# Patient Record
Sex: FEMALE | Race: WHITE | NOT HISPANIC OR LATINO | Employment: OTHER | URBAN - NONMETROPOLITAN AREA
[De-identification: names, ages, dates, MRNs, and addresses within clinical notes are randomized per-mention and may not be internally consistent; named-entity substitution may affect disease eponyms.]

---

## 2017-01-08 DIAGNOSIS — E03.8 OTHER SPECIFIED HYPOTHYROIDISM: Primary | ICD-10-CM

## 2017-01-10 RX ORDER — LEVOTHYROXINE SODIUM 25 UG/1
TABLET ORAL
Qty: 90 TABLET | Refills: 3 | Status: SHIPPED | OUTPATIENT
Start: 2017-01-10 | End: 2017-12-07

## 2017-01-11 ENCOUNTER — OFFICE VISIT (OUTPATIENT)
Dept: FAMILY MEDICINE | Facility: OTHER | Age: 77
End: 2017-01-11
Attending: FAMILY MEDICINE
Payer: MEDICARE

## 2017-01-11 VITALS
DIASTOLIC BLOOD PRESSURE: 78 MMHG | TEMPERATURE: 97.8 F | RESPIRATION RATE: 17 BRPM | BODY MASS INDEX: 33.98 KG/M2 | WEIGHT: 174 LBS | HEART RATE: 68 BPM | SYSTOLIC BLOOD PRESSURE: 139 MMHG

## 2017-01-11 DIAGNOSIS — M79.645 PAIN OF FINGER OF LEFT HAND: Primary | ICD-10-CM

## 2017-01-11 DIAGNOSIS — M46.1 SACROILIITIS (H): ICD-10-CM

## 2017-01-11 PROCEDURE — 99214 OFFICE O/P EST MOD 30 MIN: CPT | Performed by: FAMILY MEDICINE

## 2017-01-11 PROCEDURE — 72100 X-RAY EXAM L-S SPINE 2/3 VWS: CPT | Mod: TC

## 2017-01-11 PROCEDURE — 73130 X-RAY EXAM OF HAND: CPT | Mod: TC,LT | Performed by: RADIOLOGY

## 2017-01-11 PROCEDURE — 99212 OFFICE O/P EST SF 10 MIN: CPT

## 2017-01-11 PROCEDURE — 73130 X-RAY EXAM OF HAND: CPT | Mod: TC,LT

## 2017-01-11 ASSESSMENT — ANXIETY QUESTIONNAIRES
7. FEELING AFRAID AS IF SOMETHING AWFUL MIGHT HAPPEN: NOT AT ALL
5. BEING SO RESTLESS THAT IT IS HARD TO SIT STILL: NOT AT ALL
IF YOU CHECKED OFF ANY PROBLEMS ON THIS QUESTIONNAIRE, HOW DIFFICULT HAVE THESE PROBLEMS MADE IT FOR YOU TO DO YOUR WORK, TAKE CARE OF THINGS AT HOME, OR GET ALONG WITH OTHER PEOPLE: NOT DIFFICULT AT ALL
6. BECOMING EASILY ANNOYED OR IRRITABLE: NOT AT ALL
2. NOT BEING ABLE TO STOP OR CONTROL WORRYING: NOT AT ALL
GAD7 TOTAL SCORE: 0
1. FEELING NERVOUS, ANXIOUS, OR ON EDGE: NOT AT ALL
3. WORRYING TOO MUCH ABOUT DIFFERENT THINGS: NOT AT ALL

## 2017-01-11 ASSESSMENT — PATIENT HEALTH QUESTIONNAIRE - PHQ9: 5. POOR APPETITE OR OVEREATING: NOT AT ALL

## 2017-01-11 ASSESSMENT — PAIN SCALES - GENERAL: PAINLEVEL: NO PAIN (0)

## 2017-01-11 NOTE — NURSING NOTE
Chief Complaint   Patient presents with     Back Pain       Initial /78 mmHg  Pulse 68  Temp(Src) 97.8  F (36.6  C)  Resp 17  Wt 174 lb (78.926 kg) Estimated body mass index is 33.98 kg/(m^2) as calculated from the following:    Height as of 12/14/16: 5' (1.524 m).    Weight as of this encounter: 174 lb (78.926 kg).  BP completed using cuff size: regular

## 2017-01-11 NOTE — MR AVS SNAPSHOT
"              After Visit Summary   1/11/2017    Bel Reardon    MRN: [id number]           Patient Information     Date Of Birth          1940        Visit Information        Provider Department      1/11/2017 3:30 PM Jennie Rubio MD [facility]        Today's Diagnoses     Pain of finger of left hand    -  1     Sacroiliitis (H)           Care Instructions    none        Follow-ups after your visit        Your next 10 appointments already scheduled     Feb 15, 2017 10:00 AM   Radiology with HI DEXA   [facility] ([facility] )    [address] [address]   [phone]            Mar 15, 2017  8:30 AM   (Arrive by 8:15 AM)   SHORT with Jennei Rubio MD   [facility] ([facility])    [address] [place] [address] [facility] [address]   [phone]              Who to contact     If you have questions or need follow up information about today's clinic visit or your schedule please contact [facility] directly at [phone].  Normal or non-critical lab and imaging results will be communicated to you by [employer], letter or phone within 4 business days after the clinic has received the results. If you do not hear from us within 7 days, please contact the clinic through [employer] or phone. If you have a critical or abnormal lab result, we will notify you by phone as soon as possible.  Submit refill requests through [employer] or call your pharmacy and they will forward the refill request to us. Please allow 3 business days for your refill to be completed.          Additional Information About Your Visit        [employer] Information     [employer] lets you send messages to your doctor, view your test results, renew your prescriptions, schedule appointments and more. To sign up, go to [email or web address] . Click on \"Log in\" on the left side of the screen, which will take you to the Welcome page. Then click on \"Sign up Now\" on the right " side of the page.     You will be asked to enter the access code listed below, as well as some personal information. Please follow the directions to create your username and password.     Your access code is: [email or web address]  Expires: 2017 11:40 AM     Your access code will  in 90 days. If you need help or a new code, please call your [place] clinic or [phone].        Care EveryWhere ID     This is your Care EveryWhere ID. This could be used by other organizations to access your [place] medical records  [id number]        Your Vitals Were     Pulse Temperature Respirations             68 97.8  F (36.6  C) 17          Blood Pressure from Last 3 Encounters:   17 139/78   16 150/84   16 185/70    Weight from Last 3 Encounters:   17 174 lb (78.926 kg)   16 175 lb (79.379 kg)   16 180 lb (81.647 kg)              We Performed the Following     XR HAND LT G/E 3 VW (Clinic Performed)     XR LUMBAR SPINE 2/3 VIEWS (Clinic Performed)        Primary Care Provider Office Phone # Fax #    Jennie Rubio [phone]       [facility] [place] [address]  [place] [address]        Thank you!     Thank you for choosing [facility] [place]  for your care. Our goal is always to provide you with excellent care. Hearing back from our patients is one way we can continue to improve our services. Please take a few minutes to complete the written survey that you may receive in the mail after your visit with us. Thank you!             Your Updated Medication List - Protect others around you: Learn how to safely use, store and throw away your medicines at www.disposemymeds.org.          This list is accurate as of: 17  4:26 PM.  Always use your most recent med list.                   Brand Name Dispense Instructions for use    allopurinol 100 MG tablet    ZYLOPRIM    135 tablet    TAKE ONE & ONE-HALF TABLETS BY MOUTH DAILY       blood glucose monitoring  lancets     1 Box    Use to test blood sugar 1 times daily or as directed.       cinnamon 500 MG Tabs      Take 1 tablet by mouth three times a week       levothyroxine 25 MCG tablet    SYNTHROID/LEVOTHROID    90 tablet    TAKE 1 TABLET DAILY BY MOUT H - GENERIC FOR SYNTHROID       metFORMIN 500 MG tablet    GLUCOPHAGE    60 tablet    Take 500 mg with supper for the first week.  After the first week take 500 mg with breakfast and 500 mg with supper.       ONE TOUCH ULTRA test strip   Generic drug:  blood glucose monitoring     100 each    TEST BLOOD SURGARS ONCE A D AY       valsartan 80 MG tablet    DIOVAN    180 tablet    TAKE ONE TABLET TWO TIMES A DAY BY MOUTH - GENERIC FOR DIOVAN
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
## 2017-01-12 ASSESSMENT — ANXIETY QUESTIONNAIRES: GAD7 TOTAL SCORE: 0

## 2017-01-12 ASSESSMENT — PATIENT HEALTH QUESTIONNAIRE - PHQ9: SUM OF ALL RESPONSES TO PHQ QUESTIONS 1-9: 0

## 2017-01-18 ENCOUNTER — HOSPITAL ENCOUNTER (OUTPATIENT)
Dept: CT IMAGING | Facility: HOSPITAL | Age: 77
Discharge: HOME | End: 2017-01-18
Attending: FAMILY MEDICINE | Admitting: FAMILY MEDICINE
Payer: MEDICARE

## 2017-01-18 PROCEDURE — 25000125 ZZHC RX 250: Performed by: RADIOLOGY

## 2017-01-18 PROCEDURE — 27096 INJECT SACROILIAC JOINT: CPT | Mod: TC,RT

## 2017-01-18 PROCEDURE — 25000125 ZZHC RX 250

## 2017-01-18 PROCEDURE — G0260 INJ FOR SACROILIAC JT ANESTH: HCPCS | Mod: TC,RT

## 2017-01-18 RX ORDER — TRIAMCINOLONE ACETONIDE 40 MG/ML
INJECTION, SUSPENSION INTRA-ARTICULAR; INTRAMUSCULAR
Status: DISCONTINUED
Start: 2017-01-18 | End: 2017-01-19 | Stop reason: HOSPADM

## 2017-01-18 RX ORDER — BUPIVACAINE HYDROCHLORIDE 5 MG/ML
INJECTION, SOLUTION EPIDURAL; INTRACAUDAL
Status: DISCONTINUED
Start: 2017-01-18 | End: 2017-01-19 | Stop reason: HOSPADM

## 2017-01-18 RX ORDER — LIDOCAINE HYDROCHLORIDE 10 MG/ML
INJECTION, SOLUTION EPIDURAL; INFILTRATION; INTRACAUDAL; PERINEURAL
Status: COMPLETED
Start: 2017-01-18 | End: 2017-01-18

## 2017-01-18 RX ORDER — BUPIVACAINE HYDROCHLORIDE 5 MG/ML
10 INJECTION, SOLUTION PERINEURAL ONCE
Status: COMPLETED | OUTPATIENT
Start: 2017-01-18 | End: 2017-01-18

## 2017-01-18 RX ORDER — TRIAMCINOLONE ACETONIDE 40 MG/ML
40 INJECTION, SUSPENSION INTRA-ARTICULAR; INTRAMUSCULAR ONCE
Status: COMPLETED | OUTPATIENT
Start: 2017-01-18 | End: 2017-01-18

## 2017-01-18 RX ADMIN — TRIAMCINOLONE ACETONIDE 40 MG: 40 INJECTION, SUSPENSION INTRA-ARTICULAR; INTRAMUSCULAR at 13:44

## 2017-01-18 RX ADMIN — LIDOCAINE HYDROCHLORIDE 5 ML: 10 INJECTION, SOLUTION EPIDURAL; INFILTRATION; INTRACAUDAL; PERINEURAL at 13:43

## 2017-01-18 RX ADMIN — BUPIVACAINE HYDROCHLORIDE 2 ML: 5 INJECTION, SOLUTION PERINEURAL at 13:43

## 2017-01-18 NOTE — DISCHARGE INSTRUCTIONS
Home number on file [phone] (home)  Is it ok to leave a message if you are not home YES    Dr Jones completed your CT GUIDED RIGHT SACROILIAC JOINT INJECTION procedure on 1/18/2017.    Current Pain Level (0-10 Scale): 7/10  Post Pain Level (0-10):  2/10    Patient will be contacted by a diagnostic imaging nurse via telephone for follow up on pain levels in 2 weeks.    Radiology Discharge instructions for Steroid Injection    Activity Level:     Do not do any heavy activity or exercise for 24 hours.   Do not drive for 4 hours after your injection.  Diet:   Return to your normal diet.  Medications:   If you have stopped taking your Aspirin, Coumadin/Warfarin, Ibuprofen, or any   other blood thinner for this procedure you may resume in the morning unless   your primary care provider has given you other instructions.    Diabetics may see an increase in blood sugar after steroid injections. If you are concerned about your blood sugar, please contact your family doctor.    Site Care:  Remove the bandage and bathe or shower the morning after the procedure.      Call your Primary Care Provider if you have the following (if your primary care provider is not available please seek emergency care):   Nausea with vomiting   Severe headache   Drowsiness or confusion   Redness or drainage at the injection or puncture site   Temperature over 101 degrees F   Other concerns   Worsening back pain   Stiff neck    If you have any questions or concerns, please call [phone].

## 2017-01-18 NOTE — IP AVS SNAPSHOT
MRN:[id number]                      After Visit Summary   1/18/2017    Bel Reardon    MRN: [id number]           Visit Information        Provider Department      1/18/2017  1:00 PM Radiologist, Need Interventional; HI CT SCAN HI Interventional Radiology           Review of your medicines      UNREVIEWED medicines. Ask your doctor about these medicines        Dose / Directions    allopurinol 100 MG tablet   Commonly known as:  ZYLOPRIM   Used for:  Idiopathic gout, unspecified chronicity, unspecified site        TAKE ONE & ONE-HALF TABLETS BY MOUTH DAILY   Quantity:  135 tablet   Refills:  0       cinnamon 500 MG Tabs        Dose:  1 tablet   Take 1 tablet by mouth three times a week   Refills:  0       levothyroxine 25 MCG tablet   Commonly known as:  SYNTHROID/LEVOTHROID   Used for:  Other specified hypothyroidism        TAKE 1 TABLET DAILY BY MOUT H - GENERIC FOR SYNTHROID   Quantity:  90 tablet   Refills:  3       metFORMIN 500 MG tablet   Commonly known as:  GLUCOPHAGE   Used for:  Type 2 diabetes mellitus without complication, without long-term current use of insulin (H)        Take 500 mg with supper for the first week.  After the first week take 500 mg with breakfast and 500 mg with supper.   Quantity:  60 tablet   Refills:  3       valsartan 80 MG tablet   Commonly known as:  DIOVAN   Used for:  Benign essential hypertension        TAKE ONE TABLET TWO TIMES A DAY BY MOUTH - GENERIC FOR DIOVAN   Quantity:  180 tablet   Refills:  1         CONTINUE these medicines which have NOT CHANGED        Dose / Directions    blood glucose monitoring lancets   Used for:  Type 2 diabetes mellitus without complication (H)        Use to test blood sugar 1 times daily or as directed.   Quantity:  1 Box   Refills:  12       ONE TOUCH ULTRA test strip   Used for:  Type 2 diabetes mellitus without complication (H)   Generic drug:  blood glucose monitoring        TEST BLOOD SURGARS ONCE A D AY   Quantity:   100 each   Refills:  2                Protect others around you: Learn how to safely use, store and throw away your medicines at www.disposemymeds.org.         Follow-ups after your visit        Your next 10 appointments already scheduled     Feb 15, 2017 10:00 AM   Radiology with HI CALE   [facility] ([facility] )    [address] [address]   [phone]            Mar 15, 2017  8:30 AM   (Arrive by 8:15 AM)   SHORT with Jennie Rubio MD   [facility] ([facility])    [address] [facility] [address]   [phone]               Care Instructions        Further instructions from your care team       Home number on file [phone] (home)  Is it ok to leave a message if you are not home YES    Dr Jones completed your CT GUIDED RIGHT SACROILIAC JOINT INJECTION procedure on 1/18/2017.    Current Pain Level (0-10 Scale): 7/10  Post Pain Level (0-10):  2/10    Patient will be contacted by a diagnostic imaging nurse via telephone for follow up on pain levels in 2 weeks.    Radiology Discharge instructions for Steroid Injection    Activity Level:     Do not do any heavy activity or exercise for 24 hours.   Do not drive for 4 hours after your injection.  Diet:   Return to your normal diet.  Medications:   If you have stopped taking your Aspirin, Coumadin/Warfarin, Ibuprofen, or any   other blood thinner for this procedure you may resume in the morning unless   your primary care provider has given you other instructions.    Diabetics may see an increase in blood sugar after steroid injections. If you are concerned about your blood sugar, please contact your family doctor.    Site Care:  Remove the bandage and bathe or shower the morning after the procedure.      Call your Primary Care Provider if you have the following (if your primary care provider is not available please seek emergency care):   Nausea with vomiting   Severe headache   Drowsiness or  "confusion   Redness or drainage at the injection or puncture site   Temperature over 101 degrees F   Other concerns   Worsening back pain   Stiff neck    If you have any questions or concerns, please call [phone].        Additional Information About Your Visit        [employer] Information     [employer] lets you send messages to your doctor, view your test results, renew your prescriptions, schedule appointments and more. To sign up, go to [email or web address] . Click on \"Log in\" on the left side of the screen, which will take you to the Welcome page. Then click on \"Sign up Now\" on the right side of the page.     You will be asked to enter the access code listed below, as well as some personal information. Please follow the directions to create your username and password.     Your access code is: [email or web address]  Expires: 2017 11:40 AM     Your access code will  in 90 days. If you need help or a new code, please call your [facility] clinic or [phone].        Care EveryWhere ID     This is your Care EveryWhere ID. This could be used by other organizations to access your [facility] medical records  [id number]         Primary Care Provider Office Phone # Fax #    Jennie Rubio [phone]      Thank you!     Thank you for choosing [facility] for your care. Our goal is always to provide you with excellent care. Hearing back from our patients is one way we can continue to improve our services. Please take a few minutes to complete the written survey that you may receive in the mail after you visit with us. Thank you!             Medication List: This is a list of all your medications and when to take them. Check marks below indicate your daily home schedule. Keep this list as a reference.      Medications           Morning Afternoon Evening Bedtime As Needed    allopurinol 100 MG tablet   Commonly known as:  ZYLOPRIM   TAKE ONE & ONE-HALF TABLETS BY MOUTH DAILY                         "        blood glucose monitoring lancets   Use to test blood sugar 1 times daily or as directed.                                cinnamon 500 MG Tabs   Take 1 tablet by mouth three times a week                                levothyroxine 25 MCG tablet   Commonly known as:  SYNTHROID/LEVOTHROID   TAKE 1 TABLET DAILY BY MOUT H - GENERIC FOR SYNTHROID                                metFORMIN 500 MG tablet   Commonly known as:  GLUCOPHAGE   Take 500 mg with supper for the first week.  After the first week take 500 mg with breakfast and 500 mg with supper.                                ONE TOUCH ULTRA test strip   TEST BLOOD SURGARS ONCE A D AY   Generic drug:  blood glucose monitoring                                valsartan 80 MG tablet   Commonly known as:  DIOVAN   TAKE ONE TABLET TWO TIMES A DAY BY MOUTH - GENERIC FOR DIOVAN

## 2017-01-18 NOTE — IP AVS SNAPSHOT
HI CT Scan    [address] [address]    Phone:  [phone]                                       After Visit Summary   1/18/2017    Bel Reardon    MRN: [id number]           After Visit Summary Signature Page     I have received my discharge instructions, and my questions have been answered. I have discussed any challenges I see with this plan with the nurse or doctor.    ..........................................................................................................................................  Patient/Patient Representative Signature      ..........................................................................................................................................  Patient Representative Print Name and Relationship to Patient    ..................................................               ................................................  Date                                            Time    ..........................................................................................................................................  Reviewed by Signature/Title    ...................................................              ..............................................  Date                                                            Time

## 2017-02-01 ENCOUNTER — TELEPHONE (OUTPATIENT)
Dept: INTERVENTIONAL RADIOLOGY/VASCULAR | Facility: HOSPITAL | Age: 77
End: 2017-02-01

## 2017-02-03 ENCOUNTER — OFFICE VISIT (OUTPATIENT)
Dept: FAMILY MEDICINE | Facility: OTHER | Age: 77
End: 2017-02-03
Attending: FAMILY MEDICINE
Payer: COMMERCIAL

## 2017-02-03 VITALS
DIASTOLIC BLOOD PRESSURE: 74 MMHG | SYSTOLIC BLOOD PRESSURE: 122 MMHG | WEIGHT: 170 LBS | HEART RATE: 76 BPM | TEMPERATURE: 96 F | BODY MASS INDEX: 33.2 KG/M2 | RESPIRATION RATE: 17 BRPM

## 2017-02-03 DIAGNOSIS — M54.41 ACUTE RIGHT-SIDED LOW BACK PAIN WITH RIGHT-SIDED SCIATICA: ICD-10-CM

## 2017-02-03 DIAGNOSIS — M46.1 SACROILIITIS (H): Primary | ICD-10-CM

## 2017-02-03 DIAGNOSIS — T74.21XD SEXUAL ASSAULT OF ADULT, SUBSEQUENT ENCOUNTER: ICD-10-CM

## 2017-02-03 PROCEDURE — 99213 OFFICE O/P EST LOW 20 MIN: CPT | Performed by: FAMILY MEDICINE

## 2017-02-03 PROCEDURE — 99212 OFFICE O/P EST SF 10 MIN: CPT

## 2017-02-03 RX ORDER — GABAPENTIN 100 MG/1
100 CAPSULE ORAL 3 TIMES DAILY
Qty: 40 CAPSULE | Refills: 1 | Status: SHIPPED | OUTPATIENT
Start: 2017-02-03 | End: 2017-06-28

## 2017-02-03 ASSESSMENT — PATIENT HEALTH QUESTIONNAIRE - PHQ9: 5. POOR APPETITE OR OVEREATING: NOT AT ALL

## 2017-02-03 ASSESSMENT — ANXIETY QUESTIONNAIRES
IF YOU CHECKED OFF ANY PROBLEMS ON THIS QUESTIONNAIRE, HOW DIFFICULT HAVE THESE PROBLEMS MADE IT FOR YOU TO DO YOUR WORK, TAKE CARE OF THINGS AT HOME, OR GET ALONG WITH OTHER PEOPLE: NOT DIFFICULT AT ALL
1. FEELING NERVOUS, ANXIOUS, OR ON EDGE: NOT AT ALL
3. WORRYING TOO MUCH ABOUT DIFFERENT THINGS: NOT AT ALL
GAD7 TOTAL SCORE: 0
6. BECOMING EASILY ANNOYED OR IRRITABLE: NOT AT ALL
7. FEELING AFRAID AS IF SOMETHING AWFUL MIGHT HAPPEN: NOT AT ALL
2. NOT BEING ABLE TO STOP OR CONTROL WORRYING: NOT AT ALL
5. BEING SO RESTLESS THAT IT IS HARD TO SIT STILL: NOT AT ALL

## 2017-02-03 ASSESSMENT — PAIN SCALES - GENERAL: PAINLEVEL: NO PAIN (0)
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
## 2017-02-03 NOTE — NURSING NOTE
Chief Complaint   Patient presents with     Back Pain       Initial /74 mmHg  Pulse 76  Temp(Src) 96  F (35.6  C)  Resp 17  Wt 170 lb (77.111 kg) Estimated body mass index is 33.2 kg/(m^2) as calculated from the following:    Height as of 12/14/16: 5' (1.524 m).    Weight as of this encounter: 170 lb (77.111 kg).  BP completed using cuff size: regular
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
## 2017-02-04 ASSESSMENT — ANXIETY QUESTIONNAIRES: GAD7 TOTAL SCORE: 0

## 2017-02-08 ENCOUNTER — TELEPHONE (OUTPATIENT)
Dept: FAMILY MEDICINE | Facility: OTHER | Age: 77
End: 2017-02-08

## 2017-02-08 NOTE — TELEPHONE ENCOUNTER
Needs to do PT -- it will be helpful  She doesn't have to take gabapentin right now if she doesn't want to  No mri

## 2017-02-10 ENCOUNTER — HOSPITAL ENCOUNTER (OUTPATIENT)
Dept: PHYSICAL THERAPY | Facility: HOSPITAL | Age: 77
Setting detail: THERAPIES SERIES
End: 2017-02-10
Attending: FAMILY MEDICINE
Payer: MEDICARE

## 2017-02-10 DIAGNOSIS — M46.1 SACROILIITIS (H): Primary | ICD-10-CM

## 2017-02-10 DIAGNOSIS — M54.41 ACUTE RIGHT-SIDED LOW BACK PAIN WITH RIGHT-SIDED SCIATICA: ICD-10-CM

## 2017-02-10 PROCEDURE — G8978 MOBILITY CURRENT STATUS: HCPCS | Mod: GP,CK | Performed by: PHYSICAL THERAPIST

## 2017-02-10 PROCEDURE — G8979 MOBILITY GOAL STATUS: HCPCS | Mod: GP,CI | Performed by: PHYSICAL THERAPIST

## 2017-02-10 PROCEDURE — 97162 PT EVAL MOD COMPLEX 30 MIN: CPT | Mod: GP | Performed by: PHYSICAL THERAPIST

## 2017-02-10 PROCEDURE — 97110 THERAPEUTIC EXERCISES: CPT | Mod: GP | Performed by: PHYSICAL THERAPIST

## 2017-02-14 ENCOUNTER — HOSPITAL ENCOUNTER (OUTPATIENT)
Dept: PHYSICAL THERAPY | Facility: HOSPITAL | Age: 77
Setting detail: THERAPIES SERIES
End: 2017-02-14
Attending: FAMILY MEDICINE
Payer: MEDICARE

## 2017-02-14 PROCEDURE — 97110 THERAPEUTIC EXERCISES: CPT | Mod: GP | Performed by: PHYSICAL THERAPIST

## 2017-02-14 PROCEDURE — 97140 MANUAL THERAPY 1/> REGIONS: CPT | Mod: GP | Performed by: PHYSICAL THERAPIST

## 2017-02-16 ENCOUNTER — HOSPITAL ENCOUNTER (OUTPATIENT)
Dept: PHYSICAL THERAPY | Facility: HOSPITAL | Age: 77
Setting detail: THERAPIES SERIES
End: 2017-02-16
Attending: FAMILY MEDICINE
Payer: MEDICARE

## 2017-02-16 PROCEDURE — 97140 MANUAL THERAPY 1/> REGIONS: CPT | Mod: GP | Performed by: PHYSICAL THERAPIST

## 2017-02-16 PROCEDURE — 97110 THERAPEUTIC EXERCISES: CPT | Mod: GP | Performed by: PHYSICAL THERAPIST

## 2017-02-21 ENCOUNTER — OFFICE VISIT (OUTPATIENT)
Dept: OBGYN | Facility: OTHER | Age: 77
End: 2017-02-21
Attending: OBSTETRICS & GYNECOLOGY
Payer: COMMERCIAL

## 2017-02-21 VITALS
HEART RATE: 85 BPM | HEIGHT: 60 IN | BODY MASS INDEX: 34.36 KG/M2 | WEIGHT: 175 LBS | DIASTOLIC BLOOD PRESSURE: 95 MMHG | OXYGEN SATURATION: 98 % | SYSTOLIC BLOOD PRESSURE: 153 MMHG

## 2017-02-21 DIAGNOSIS — N95.0 POST-MENOPAUSAL BLEEDING: Primary | ICD-10-CM

## 2017-02-21 PROCEDURE — 99212 OFFICE O/P EST SF 10 MIN: CPT

## 2017-02-21 PROCEDURE — 99213 OFFICE O/P EST LOW 20 MIN: CPT | Performed by: OBSTETRICS & GYNECOLOGY

## 2017-02-21 ASSESSMENT — PAIN SCALES - GENERAL: PAINLEVEL: NO PAIN (0)

## 2017-02-21 NOTE — MR AVS SNAPSHOT
"              After Visit Summary   2/21/2017    Bel Reardon    MRN: [id number]           Patient Information     Date Of Birth          1940        Visit Information        Provider Department      2/21/2017 2:30 PM [facility]ern MD [facility]        Today's Diagnoses     Post-menopausal bleeding    -  1      Care Instructions    F/u 2 wks        Follow-ups after your visit        Your next 10 appointments already scheduled     Mar 07, 2017  8:30 AM CST   (Arrive by 8:15 AM)   SHORT with Seven Kern MD   [facility] [place] ([facility])    [address]  [place] [address]   [phone]            Mar 20, 2017  9:45 AM CDT   (Arrive by 9:30 AM)   SHORT with Jennie Rubio MD   [facility] [place] ([facility])    [address]  [place] [address]   [phone]              Who to contact     If you have questions or need follow up information about today's clinic visit or your schedule please contact [facility] directly at [phone].  Normal or non-critical lab and imaging results will be communicated to you by [employer], letter or phone within 4 business days after the clinic has received the results. If you do not hear from us within 7 days, please contact the clinic through [employer] or phone. If you have a critical or abnormal lab result, we will notify you by phone as soon as possible.  Submit refill requests through [employer] or call your pharmacy and they will forward the refill request to us. Please allow 3 business days for your refill to be completed.          Additional Information About Your Visit        MyChart Information     [employer] lets you send messages to your doctor, view your test results, renew your prescriptions, schedule appointments and more. To sign up, go to [email or web address] . Click on \"Log in\" on the left side of the screen, which will take you to the Welcome page. Then click on \"Sign up Now\" on the right " side of the page.     You will be asked to enter the access code listed below, as well as some personal information. Please follow the directions to create your username and password.     Your access code is: [email or web address]  Expires: 2017  4:37 PM     Your access code will  in 90 days. If you need help or a new code, please call your [facility] clinic or [phone].        Care EveryWhere ID     This is your Care EveryWhere ID. This could be used by other organizations to access your [facility] medical records  [id number]        Your Vitals Were     Pulse Height Pulse Oximetry BMI (Body Mass Index)          85 5' (1.524 m) 98% 34.18 kg/m2         Blood Pressure from Last 3 Encounters:   17 (!) 153/95   17 122/74   17 139/78    Weight from Last 3 Encounters:   17 175 lb (79.4 kg)   17 170 lb (77.1 kg)   17 174 lb (78.9 kg)              We Performed the Following     US Pelvic Complete with Transvaginal        Primary Care Provider Office Phone # Fax #    Jennie Rubio [phone]       [facility] [place] [address] [facility] [address]  [place] [address]        Thank you!     Thank you for choosing [facility] [place]  for your care. Our goal is always to provide you with excellent care. Hearing back from our patients is one way we can continue to improve our services. Please take a few minutes to complete the written survey that you may receive in the mail after your visit with us. Thank you!             Your Updated Medication List - Protect others around you: Learn how to safely use, store and throw away your medicines at www.disposemymeds.org.          This list is accurate as of: 17  4:37 PM.  Always use your most recent med list.                   Brand Name Dispense Instructions for use    allopurinol 100 MG tablet    ZYLOPRIM    135 tablet    TAKE ONE & ONE-HALF TABLETS BY MOUTH DAILY       blood glucose monitoring lancets     1 Box    Use to  test blood sugar 1 times daily or as directed.       cinnamon 500 MG Tabs      Take 1 tablet by mouth three times a week Reported on 2/21/2017       gabapentin 100 MG capsule    NEURONTIN    40 capsule    Take 1 capsule (100 mg) by mouth 3 times daily       levothyroxine 25 MCG tablet    SYNTHROID/LEVOTHROID    90 tablet    TAKE 1 TABLET DAILY BY MOUT H - GENERIC FOR SYNTHROID       metFORMIN 500 MG tablet    GLUCOPHAGE    60 tablet    Take 500 mg with supper for the first week.  After the first week take 500 mg with breakfast and 500 mg with supper.       ONE TOUCH ULTRA test strip   Generic drug:  blood glucose monitoring     100 each    TEST BLOOD SURGARS ONCE A D AY       valsartan 80 MG tablet    DIOVAN    180 tablet    TAKE ONE TABLET TWO TIMES A DAY BY MOUTH - GENERIC FOR DIOVAN

## 2017-02-21 NOTE — NURSING NOTE
Chief Complaint   Patient presents with     RECHECK     had D&C, hysteroscopy on 1/13/16, pt started bleeding again.       Initial BP (!) 153/95 (BP Location: Left arm, Cuff Size: Adult Regular)  Pulse 85  Ht 5' (1.524 m)  Wt 175 lb (79.4 kg)  SpO2 98%  BMI 34.18 kg/m2 Estimated body mass index is 34.18 kg/(m^2) as calculated from the following:    Height as of this encounter: 5' (1.524 m).    Weight as of this encounter: 175 lb (79.4 kg).  Medication Reconciliation: complete     Nora Laboy

## 2017-02-21 NOTE — PROGRESS NOTES
CC:   postmenopausal Bleeding  HPI:  Bel Reardon is a 77 year old female is a   P2 (vag).   Menopause was at age 52.  Pt had 3-4 days very light spotting two wks ago and none since.  This followed a cortisone shot.  No other complaints.      Patients records are available and reviewed at today's visit.    Past GYN history: Unremarkable D and C/Hysteroscopy for endometrial polyp/PMB 2016  Pelvic pain: No  Abnormal Discharge: No  Previous work-up: Yes  Abnormal pap: No  Vaginitis symptoms;  No  HRT:  No         Last PAP smear:  Normal      Past Medical History   Diagnosis Date     Agoraphobia with panic disorder 03/24/2011     Anxiety 03/24/2011     DM2 (diabetes mellitus, type 2) (H)      Epistaxis      Fibrocystic Breast Disease 03/24/2011     GERD 03/24/2011     Hx H. Pylori     Gout, unspecified 01/04/2011     Hypertension, Benign 03/24/2011     Lichen sclerosus      declines medication     Normal cardiac stress test 02/28/2013 Feb 2013 [facility]nl     Pure hypercholesterolemia 01/13/2009       Past Surgical History   Procedure Laterality Date     Left shoulder  1990     nerve damage  s/p trauma; from repetitious work     Lumpectomy left breast  1990     benign -- fibroadenoma-left; Facility; [facility]     Phacoemulsification with standard intraocular lens implant Left 11/11/2014     Procedure: PHACOEMULSIFICATION WITH STANDARD INTRAOCULAR LENS IMPLANT;  Surgeon: Bobby Arambula MD;  Location: [facility]     Colonoscopy N/A 8/21/2015     no further scopes needed.  Surgeon: Yohan Licona DO;  Location: [facility]     Dilation and curettage, operative hysteroscopy, combined N/A 1/13/2016     Procedure: COMBINED DILATION AND CURETTAGE, OPERATIVE HYSTEROSCOPY;  Surgeon: Seven Kern MD;  Location: [facility]       Family History   Problem Relation Age of Onset     Other - See Comments Brother 50     AMI x2     C.A.D. Father      Hypertension Father      Other - See Comments Father 78     hyperlipidemia/MI; cause of death      C.A.D. Mother      s/p AMI, pacemaker     DIABETES Mother 95     Hypertension Mother      Other - See Comments Mother      hyperlipidemia/hypothyroid     CANCER Brother 72     lip; cause of death     CANCER Sister 65     lung     Other - See Comments Sister      hyperlipidemia     Hypertension Sister      Coronary Artery Disease Brother      gallbladder       Allergies: Aspirin; Erythromycin base [kdc:yellow dye+erythromycin+brilliant blue fcf]; Ezetimibe; Fenofibrate micronized [fenofibrate]; Levofloxacin; Lisinopril; No clinical screening - see comments; Pravastatin sodium; Dexamethasone; Dexamethasone sod phosphate [dexamethasone sodium phosphate]; Niacin; and Rosuvastatin calcium    Current Outpatient Prescriptions   Medication Sig Dispense Refill     levothyroxine (SYNTHROID/LEVOTHROID) 25 MCG tablet TAKE 1 TABLET DAILY BY MOUT H - GENERIC FOR SYNTHROID 90 tablet 3     metFORMIN (GLUCOPHAGE) 500 MG tablet Take 500 mg with supper for the first week.  After the first week take 500 mg with breakfast and 500 mg with supper. 60 tablet 3     valsartan (DIOVAN) 80 MG tablet TAKE ONE TABLET TWO TIMES A DAY BY MOUTH - GENERIC FOR DIOVAN 180 tablet 1     allopurinol (ZYLOPRIM) 100 MG tablet TAKE ONE & ONE-HALF TABLETS BY MOUTH DAILY 135 tablet 0     ONE TOUCH ULTRA test strip TEST BLOOD SURGARS ONCE A D  each 2     blood glucose monitoring (ONE TOUCH DELICA) lancets Use to test blood sugar 1 times daily or as directed. 1 Box 12     gabapentin (NEURONTIN) 100 MG capsule Take 1 capsule (100 mg) by mouth 3 times daily (Patient not taking: Reported on 2/21/2017) 40 capsule 1     cinnamon 500 MG TABS Take 1 tablet by mouth three times a week Reported on 2/21/2017         ROS:  C: NEGATIVE for fever, chills, change in weight  GI: NEGATIVE for nausea, abdominal pain, heartburn, or change in bowel habits  : NEGATIVE for frequency, dysuria, hematuria, vaginal discharge      EXAM:  Blood pressure (!) 153/95, pulse 85,  height 5' (1.524 m), weight 175 lb (79.4 kg), SpO2 98 %.   BMI= Body mass index is 34.18 kg/(m^2).  General - pleasant female in no acute distress.  Musculoskeletal - no gross deformities.  Neurological - normal strength, sensation, and mental status.      ASSESSMENT/PLAN:  (N95.0) Post-menopausal bleeding  (primary encounter diagnosis)  Comment: h/o endometrial polyp  Plan: US Pelvic Complete with Transvaginal        F/u appt two weeks for results.  Will consider embx or d and c/hysteroscopy based on results.  Pt agrees with POC.  20 minutes were spent with the patient with greater than 50% of the visit spent in face-to-face counseling and coordination of care.  Handouts on menopausal bleeding reviewed with pt.             Seven Kern MD

## 2017-02-22 ENCOUNTER — HOSPITAL ENCOUNTER (OUTPATIENT)
Dept: ULTRASOUND IMAGING | Facility: HOSPITAL | Age: 77
Discharge: HOME | End: 2017-02-22
Attending: OBSTETRICS & GYNECOLOGY | Admitting: OBSTETRICS & GYNECOLOGY
Payer: MEDICARE

## 2017-02-22 PROCEDURE — 76830 TRANSVAGINAL US NON-OB: CPT | Mod: TC

## 2017-02-22 PROCEDURE — 76856 US EXAM PELVIC COMPLETE: CPT | Mod: TC

## 2017-02-24 ENCOUNTER — HOSPITAL ENCOUNTER (OUTPATIENT)
Dept: PHYSICAL THERAPY | Facility: HOSPITAL | Age: 77
Setting detail: THERAPIES SERIES
End: 2017-02-24
Attending: FAMILY MEDICINE
Payer: MEDICARE

## 2017-02-24 PROCEDURE — 97110 THERAPEUTIC EXERCISES: CPT | Mod: GP | Performed by: PHYSICAL THERAPIST

## 2017-02-24 PROCEDURE — 97140 MANUAL THERAPY 1/> REGIONS: CPT | Mod: GP | Performed by: PHYSICAL THERAPIST

## 2017-02-28 ENCOUNTER — HOSPITAL ENCOUNTER (OUTPATIENT)
Dept: PHYSICAL THERAPY | Facility: HOSPITAL | Age: 77
Setting detail: THERAPIES SERIES
End: 2017-02-28
Attending: FAMILY MEDICINE
Payer: MEDICARE

## 2017-02-28 PROCEDURE — 97110 THERAPEUTIC EXERCISES: CPT | Mod: GP | Performed by: PHYSICAL THERAPIST

## 2017-02-28 PROCEDURE — 97140 MANUAL THERAPY 1/> REGIONS: CPT | Mod: GP | Performed by: PHYSICAL THERAPIST

## 2017-03-02 ENCOUNTER — HOSPITAL ENCOUNTER (OUTPATIENT)
Dept: PHYSICAL THERAPY | Facility: HOSPITAL | Age: 77
Setting detail: THERAPIES SERIES
End: 2017-03-02
Attending: FAMILY MEDICINE
Payer: MEDICARE

## 2017-03-02 PROCEDURE — 97140 MANUAL THERAPY 1/> REGIONS: CPT | Mod: GP | Performed by: PHYSICAL THERAPIST

## 2017-03-02 PROCEDURE — G8980 MOBILITY D/C STATUS: HCPCS | Mod: GP,CK | Performed by: PHYSICAL THERAPIST

## 2017-03-02 PROCEDURE — G8979 MOBILITY GOAL STATUS: HCPCS | Mod: GP,CI | Performed by: PHYSICAL THERAPIST

## 2017-03-02 PROCEDURE — 97110 THERAPEUTIC EXERCISES: CPT | Mod: GP | Performed by: PHYSICAL THERAPIST

## 2017-03-07 ENCOUNTER — OFFICE VISIT (OUTPATIENT)
Dept: OBGYN | Facility: OTHER | Age: 77
End: 2017-03-07
Attending: OBSTETRICS & GYNECOLOGY
Payer: MEDICARE

## 2017-03-07 VITALS
OXYGEN SATURATION: 94 % | HEART RATE: 88 BPM | WEIGHT: 175 LBS | DIASTOLIC BLOOD PRESSURE: 91 MMHG | HEIGHT: 61 IN | BODY MASS INDEX: 33.04 KG/M2 | SYSTOLIC BLOOD PRESSURE: 145 MMHG

## 2017-03-07 DIAGNOSIS — N95.0 POSTMENOPAUSAL BLEEDING: Primary | ICD-10-CM

## 2017-03-07 DIAGNOSIS — N90.4 LICHEN SCLEROSUS ET ATROPHICUS OF THE VULVA: ICD-10-CM

## 2017-03-07 PROCEDURE — 88142 CYTOPATH C/V THIN LAYER: CPT | Performed by: OBSTETRICS & GYNECOLOGY

## 2017-03-07 PROCEDURE — 99214 OFFICE O/P EST MOD 30 MIN: CPT | Mod: 25

## 2017-03-07 PROCEDURE — 88305 TISSUE EXAM BY PATHOLOGIST: CPT | Mod: TC | Performed by: OBSTETRICS & GYNECOLOGY

## 2017-03-07 PROCEDURE — 58100 BIOPSY OF UTERUS LINING: CPT | Performed by: OBSTETRICS & GYNECOLOGY

## 2017-03-07 PROCEDURE — 99213 OFFICE O/P EST LOW 20 MIN: CPT | Mod: 25 | Performed by: OBSTETRICS & GYNECOLOGY

## 2017-03-07 PROCEDURE — 57452 EXAM OF CERVIX W/SCOPE: CPT | Performed by: OBSTETRICS & GYNECOLOGY

## 2017-03-07 ASSESSMENT — PAIN SCALES - GENERAL: PAINLEVEL: NO PAIN (0)

## 2017-03-07 NOTE — LETTER
[facility]  [address] [place] [address] [place]  [facility] [address]  [phone]        March 14, 2017    Bel Reardon  [address] [facility] [address]          Dear Bel Reardon    Your pap smear is normal.  It is generally recommended that women have a yearly pelvic exam.  If your provider  requested that you have a pap smear more frequently because of any previous problems please schedule that appointment as requested.  If you have any questions please call the clinic at [phone].      Sincerely,        Seven Kern MD/ thien CHOI

## 2017-03-07 NOTE — PROGRESS NOTES
CC:  F/u  for postmenopausal Bleeding  HPI:  Bel Reardon is a 77 year old female is a   P2.   Menopausal female with PMB/spotting for several days in February then nothing since.  She has h/o endometrial polyp.   US reviewed and unremarkable with 4-5 mm endometrial stripe.  Present for f/u, exam, Embx and office hysteroscopy.  No new complaints.  She does have mild external vaginal irritation and h/o vulvar LSE.      Patients records are available and reviewed at today's visit.  See my prior eval.             Last PAP smear:  nml per pt.  No record on EMR.      Past Medical History   Diagnosis Date     Agoraphobia with panic disorder 03/24/2011     Anxiety 03/24/2011     DM2 (diabetes mellitus, type 2) (H)      Epistaxis      Fibrocystic Breast Disease 03/24/2011     GERD 03/24/2011     Hx H. Pylori     Gout, unspecified 01/04/2011     Hypertension, Benign 03/24/2011     Lichen sclerosus      declines medication     Normal cardiac stress test 02/28/2013 Feb 2013 wnl     Pure hypercholesterolemia 01/13/2009       Past Surgical History   Procedure Laterality Date     Left shoulder  1990     nerve damage  s/p trauma; from repetitious work     Lumpectomy left breast  1990     benign -- fibroadenoma-left; Facility; [facility]     Phacoemulsification with standard intraocular lens implant Left 11/11/2014     Procedure: PHACOEMULSIFICATION WITH STANDARD INTRAOCULAR LENS IMPLANT;  Surgeon: Bobby Arambula MD;  Location: [facility]     Colonoscopy N/A 8/21/2015     no further scopes needed.  Surgeon: Yohan Licona DO;  Location: [facility]     Dilation and curettage, operative hysteroscopy, combined N/A 1/13/2016     Procedure: COMBINED DILATION AND CURETTAGE, OPERATIVE HYSTEROSCOPY;  Surgeon: Seven Kern MD;  Location: [facility]       Family History   Problem Relation Age of Onset     Other - See Comments Brother 50     AMI x2     C.A.D. Father      Hypertension Father      Other - See Comments Father 78      hyperlipidemia/MI; cause of death     C.A.D. Mother      s/p AMI, pacemaker     DIABETES Mother 95     Hypertension Mother      Other - See Comments Mother      hyperlipidemia/hypothyroid     CANCER Brother 72     lip; cause of death     CANCER Sister 65     lung     Other - See Comments Sister      hyperlipidemia     Hypertension Sister      Coronary Artery Disease Brother      gallbladder       Allergies: Aspirin; Erythromycin base [kdc:yellow dye+erythromycin+brilliant blue fcf]; Ezetimibe; Fenofibrate micronized [fenofibrate]; Levofloxacin; Lisinopril; No clinical screening - see comments; Pravastatin sodium; Dexamethasone; Dexamethasone sod phosphate [dexamethasone sodium phosphate]; Niacin; and Rosuvastatin calcium    Current Outpatient Prescriptions   Medication Sig Dispense Refill     levothyroxine (SYNTHROID/LEVOTHROID) 25 MCG tablet TAKE 1 TABLET DAILY BY MOUT H - GENERIC FOR SYNTHROID 90 tablet 3     metFORMIN (GLUCOPHAGE) 500 MG tablet Take 500 mg with supper for the first week.  After the first week take 500 mg with breakfast and 500 mg with supper. 60 tablet 3     valsartan (DIOVAN) 80 MG tablet TAKE ONE TABLET TWO TIMES A DAY BY MOUTH - GENERIC FOR DIOVAN 180 tablet 1     allopurinol (ZYLOPRIM) 100 MG tablet TAKE ONE & ONE-HALF TABLETS BY MOUTH DAILY 135 tablet 0     gabapentin (NEURONTIN) 100 MG capsule Take 1 capsule (100 mg) by mouth 3 times daily (Patient not taking: Reported on 2/21/2017) 40 capsule 1     ONE TOUCH ULTRA test strip TEST BLOOD SURGARS ONCE A D AY (Patient not taking: Reported on 3/7/2017) 100 each 2     blood glucose monitoring (ONE TOUCH DELICA) lancets Use to test blood sugar 1 times daily or as directed. (Patient not taking: Reported on 3/7/2017) 1 Box 12     cinnamon 500 MG TABS Take 1 tablet by mouth three times a week Reported on 3/7/2017         ROS:  C: NEGATIVE for fever, chills, change in weight  : NEGATIVE for frequency, dysuria, hematuria, vaginal discharge  GI  "neg      EXAM:  Blood pressure (!) 145/91, pulse 88, height 5' 1\" (1.549 m), weight 175 lb (79.4 kg), SpO2 94 %.   BMI= Body mass index is 33.07 kg/(m^2).  General - pleasant female in no acute distress.  Abdomen - soft, nontender, nondistended, no hepatosplenomegaly.  Pelvic - EG: normal adult female with atrophic and hypopigmented areas around vulval c/w vulvar LSE. BUS: within normal limits, Vagina: atophic, no discharge, Cervix: no lesions or CMT, Uterus: firm, normal sized and nontender, Adnexae: no masses or tenderness.   Pap done  Rectovaginal - deferred.  Musculoskeletal - no gross deformities.  [facility] Procedure Note  PREOPERATIVE DIAGNOSIS:  PMB  POSTOPERATIVE DIAGNOSIS:  Same, likely atrophic.  Embx pending.   PROCEDURE:  Office hysteroscopy.   SURGEON:  Seven Kern MD   ASSISTANT:  None.   ESTIMATED BLOOD LOSS:  Minimal.   SPECIMENS:  Embx  ANESTHESIA:  Para cervical block (1% Lidocaine)  OPERATIVE FINDINGS:  Normal appearing endometrial cavity.  No evidence retained polyps or fibroids.  Atrophic benign appearing endometrium.    OPERATIVE DICTATION: Informed consent for procedure was obtained and procedure verified with patient.  She was positioned in the dorsal lithotomy position and a speculum was placed.   The cervix was prepped with betadine.   A cervical and paracervical block was performed with 1/% lidocaine without epinephrine.  The cervix was grasped anteriorly with a fine-tooth tenaculum and gently dilated with Gabriela dilators.   Endometrial biopsy was performed with Pipelle in two passes.   Hysteroscopy was performed using an Endosee flexible hysteroscope with normal saline as distention media.  Visualization was excellent.  Findings were as described above.  The hysteroscope and speculum were removed.  There were no complications and the patient tolerated the procedure well.        ASSESSMENT/PLAN:  Postmenopausal bleeding.  Normal office hysteroscopy and pelvic US.  Await pap, " endometrial bx results.  If negative no further w/u.    Vulvar LSE.  Clobetasol rx 0.05% daily.  F/u 3 months for reexamination and vulvar bx if indicated at that time.    I spent 20 minutes on this patient's visit (exclusive of separately billed services/procedures) and over half of this time was spent in face-to-face counseling regarding her diagnosis, treatment options with emphasis on  risks and benefits of each, prognosis and importance of compliance..          Seven Kern MD

## 2017-03-07 NOTE — MR AVS SNAPSHOT
"              After Visit Summary   3/7/2017    Bel Reardon    MRN: [id number]           Patient Information     Date Of Birth          1940        Visit Information        Provider Department      3/7/2017 8:30 AM Seven Kern MD [facility]        Today's Diagnoses     Postmenopausal bleeding    -  1    Lichen sclerosus et atrophicus of the vulva          Care Instructions    F/u 3 months        Follow-ups after your visit        Your next 10 appointments already scheduled     Mar 20, 2017  9:45 AM CDT   (Arrive by 9:30 AM)   SHORT with Jennie Rubio MD   [facility] ([facility] [place] [facility])    [address]  [place] [address]   [phone]            Aug 08, 2017 10:30 AM CDT   (Arrive by 10:15 AM)   SHORT with Seven Kern MD   [facility] ([facility] [place] [facility])    [address]  [place] [address]   [phone]              Who to contact     If you have questions or need follow up information about today's clinic visit or your schedule please contact [facility] directly at [phone].  Normal or non-critical lab and imaging results will be communicated to you by MyChart, letter or phone within 4 business days after the clinic has received the results. If you do not hear from us within 7 days, please contact the clinic through Weottahart or phone. If you have a critical or abnormal lab result, we will notify you by phone as soon as possible.  Submit refill requests through Realitycheck or call your pharmacy and they will forward the refill request to us. Please allow 3 business days for your refill to be completed.          Additional Information About Your Visit        MyChart Information     Realitycheck lets you send messages to your doctor, view your test results, renew your prescriptions, schedule appointments and more. To sign up, go to [email or web address] . Click on \"Log in\" on the left side of the screen, which will take you to the " "Welcome page. Then click on \"Sign up Now\" on the right side of the page.     You will be asked to enter the access code listed below, as well as some personal information. Please follow the directions to create your username and password.     Your access code is: [email or web address]  Expires: 2017  4:37 PM     Your access code will  in 90 days. If you need help or a new code, please call your [place] clinic or [phone].        Care EveryWhere ID     This is your Care EveryWhere ID. This could be used by other organizations to access your [place] medical records  [id number]        Your Vitals Were     Pulse Height Pulse Oximetry BMI (Body Mass Index)          88 5' 1\" (1.549 m) 94% 33.07 kg/m2         Blood Pressure from Last 3 Encounters:   17 (!) 145/91   17 (!) 153/95   17 122/74    Weight from Last 3 Encounters:   17 175 lb (79.4 kg)   17 175 lb (79.4 kg)   17 170 lb (77.1 kg)              We Performed the Following     ENDOMETRIAL BIOPSY W/O CERVICAL DILATION     HYSTEROSCOPY DIAGOSTIC (SEPARATE PROC)        Primary Care Provider Office Phone # Fax #    Jennie Rubio [phone]       [facility] [place] [address] [place] [id number]        Thank you!     Thank you for choosing [facility]  for your care. Our goal is always to provide you with excellent care. Hearing back from our patients is one way we can continue to improve our services. Please take a few minutes to complete the written survey that you may receive in the mail after your visit with us. Thank you!             Your Updated Medication List - Protect others around you: Learn how to safely use, store and throw away your medicines at www.disposemymeds.org.          This list is accurate as of: 3/7/17  9:16 AM.  Always use your most recent med list.                   Brand Name Dispense Instructions for use    allopurinol 100 MG tablet    ZYLOPRIM    135 tablet "    TAKE ONE & ONE-HALF TABLETS BY MOUTH DAILY       blood glucose monitoring lancets     1 Box    Use to test blood sugar 1 times daily or as directed.       cinnamon 500 MG Tabs      Take 1 tablet by mouth three times a week Reported on 3/7/2017       gabapentin 100 MG capsule    NEURONTIN    40 capsule    Take 1 capsule (100 mg) by mouth 3 times daily       levothyroxine 25 MCG tablet    SYNTHROID/LEVOTHROID    90 tablet    TAKE 1 TABLET DAILY BY MOUT H - GENERIC FOR SYNTHROID       metFORMIN 500 MG tablet    GLUCOPHAGE    60 tablet    Take 500 mg with supper for the first week.  After the first week take 500 mg with breakfast and 500 mg with supper.       ONE TOUCH ULTRA test strip   Generic drug:  blood glucose monitoring     100 each    TEST BLOOD SURGARS ONCE A D AY       valsartan 80 MG tablet    DIOVAN    180 tablet    TAKE ONE TABLET TWO TIMES A DAY BY MOUTH - GENERIC FOR DIOVAN

## 2017-03-07 NOTE — NURSING NOTE
"Chief Complaint   Patient presents with     RECHECK     office hysteroscopy and EMB for abnormal bleeding       Initial BP (!) 145/91 (BP Location: Right arm, Cuff Size: Adult Large)  Pulse 88  Ht 5' 1\" (1.549 m)  Wt 175 lb (79.4 kg)  SpO2 94%  BMI 33.07 kg/m2 Estimated body mass index is 33.07 kg/(m^2) as calculated from the following:    Height as of this encounter: 5' 1\" (1.549 m).    Weight as of this encounter: 175 lb (79.4 kg).  Medication Reconciliation: complete     Nora Laboy      "

## 2017-03-08 LAB — COPATH REPORT: NORMAL

## 2017-03-09 ENCOUNTER — TELEPHONE (OUTPATIENT)
Dept: EDUCATION SERVICES | Facility: HOSPITAL | Age: 77
End: 2017-03-09

## 2017-03-09 DIAGNOSIS — E11.9 TYPE 2 DIABETES MELLITUS WITHOUT COMPLICATION, WITHOUT LONG-TERM CURRENT USE OF INSULIN (H): Primary | ICD-10-CM

## 2017-03-09 RX ORDER — CLOBETASOL PROPIONATE 0.5 MG/G
OINTMENT TOPICAL
Qty: 45 G | Refills: 3 | Status: SHIPPED | OUTPATIENT
Start: 2017-03-09 | End: 2017-08-30

## 2017-03-09 RX ORDER — BLOOD-GLUCOSE METER
1 KIT MISCELLANEOUS DAILY
Qty: 1 KIT | Refills: 0 | Status: SHIPPED
Start: 2017-03-09

## 2017-03-09 NOTE — TELEPHONE ENCOUNTER
Patient calling about her One Touch Ultra Mini meter it hasn't been working right and the numbers are goofy she would like a prescription sent to her pharmacy at [facility] in [place] for a new meter.

## 2017-03-14 LAB
COPATH REPORT: NORMAL
PAP: NORMAL

## 2017-03-20 ENCOUNTER — OFFICE VISIT (OUTPATIENT)
Dept: FAMILY MEDICINE | Facility: OTHER | Age: 77
End: 2017-03-20
Attending: FAMILY MEDICINE
Payer: MEDICARE

## 2017-03-20 VITALS
HEIGHT: 61 IN | SYSTOLIC BLOOD PRESSURE: 144 MMHG | HEART RATE: 64 BPM | BODY MASS INDEX: 33.04 KG/M2 | WEIGHT: 175 LBS | DIASTOLIC BLOOD PRESSURE: 82 MMHG | TEMPERATURE: 97.8 F

## 2017-03-20 DIAGNOSIS — M54.41 CHRONIC RIGHT-SIDED LOW BACK PAIN WITH RIGHT-SIDED SCIATICA: Primary | ICD-10-CM

## 2017-03-20 DIAGNOSIS — E11.9 TYPE 2 DIABETES MELLITUS WITHOUT COMPLICATION, WITHOUT LONG-TERM CURRENT USE OF INSULIN (H): ICD-10-CM

## 2017-03-20 DIAGNOSIS — E78.2 MIXED HYPERLIPIDEMIA: ICD-10-CM

## 2017-03-20 DIAGNOSIS — I10 BENIGN ESSENTIAL HYPERTENSION: ICD-10-CM

## 2017-03-20 DIAGNOSIS — G89.29 CHRONIC RIGHT-SIDED LOW BACK PAIN WITH RIGHT-SIDED SCIATICA: Primary | ICD-10-CM

## 2017-03-20 LAB
ALBUMIN SERPL-MCNC: 3.7 G/DL (ref 3.4–5)
ALP SERPL-CCNC: 56 U/L (ref 40–150)
ALT SERPL W P-5'-P-CCNC: 55 U/L (ref 0–50)
ANION GAP SERPL CALCULATED.3IONS-SCNC: 9 MMOL/L (ref 3–14)
AST SERPL W P-5'-P-CCNC: 42 U/L (ref 0–45)
BILIRUB SERPL-MCNC: 0.4 MG/DL (ref 0.2–1.3)
BUN SERPL-MCNC: 16 MG/DL (ref 7–30)
CALCIUM SERPL-MCNC: 8.8 MG/DL (ref 8.5–10.1)
CHLORIDE SERPL-SCNC: 108 MMOL/L (ref 94–109)
CHOLEST SERPL-MCNC: 282 MG/DL
CO2 SERPL-SCNC: 25 MMOL/L (ref 20–32)
CREAT SERPL-MCNC: 0.83 MG/DL (ref 0.52–1.04)
EST. AVERAGE GLUCOSE BLD GHB EST-MCNC: 140 MG/DL
GFR SERPL CREATININE-BSD FRML MDRD: 67 ML/MIN/1.7M2
GLUCOSE SERPL-MCNC: 136 MG/DL (ref 70–99)
HBA1C MFR BLD: 6.5 % (ref 4.3–6)
HDLC SERPL-MCNC: 53 MG/DL
LDLC SERPL CALC-MCNC: 161 MG/DL
NONHDLC SERPL-MCNC: 229 MG/DL
POTASSIUM SERPL-SCNC: 4.4 MMOL/L (ref 3.4–5.3)
PROT SERPL-MCNC: 7.1 G/DL (ref 6.8–8.8)
SODIUM SERPL-SCNC: 142 MMOL/L (ref 133–144)
TRIGL SERPL-MCNC: 340 MG/DL

## 2017-03-20 PROCEDURE — 80061 LIPID PANEL: CPT | Performed by: FAMILY MEDICINE

## 2017-03-20 PROCEDURE — 40000788 ZZHCL STATISTIC ESTIMATED AVERAGE GLUCOSE: Performed by: FAMILY MEDICINE

## 2017-03-20 PROCEDURE — 36415 COLL VENOUS BLD VENIPUNCTURE: CPT | Performed by: FAMILY MEDICINE

## 2017-03-20 PROCEDURE — 99212 OFFICE O/P EST SF 10 MIN: CPT

## 2017-03-20 PROCEDURE — 80053 COMPREHEN METABOLIC PANEL: CPT | Performed by: FAMILY MEDICINE

## 2017-03-20 PROCEDURE — 99214 OFFICE O/P EST MOD 30 MIN: CPT | Performed by: FAMILY MEDICINE

## 2017-03-20 PROCEDURE — 83036 HEMOGLOBIN GLYCOSYLATED A1C: CPT | Performed by: FAMILY MEDICINE

## 2017-03-20 RX ORDER — CIDER VINEGAR 300 MG
1 TABLET ORAL DAILY
COMMUNITY

## 2017-03-20 ASSESSMENT — ANXIETY QUESTIONNAIRES
2. NOT BEING ABLE TO STOP OR CONTROL WORRYING: NOT AT ALL
IF YOU CHECKED OFF ANY PROBLEMS ON THIS QUESTIONNAIRE, HOW DIFFICULT HAVE THESE PROBLEMS MADE IT FOR YOU TO DO YOUR WORK, TAKE CARE OF THINGS AT HOME, OR GET ALONG WITH OTHER PEOPLE: NOT DIFFICULT AT ALL
6. BECOMING EASILY ANNOYED OR IRRITABLE: NOT AT ALL
5. BEING SO RESTLESS THAT IT IS HARD TO SIT STILL: NOT AT ALL
3. WORRYING TOO MUCH ABOUT DIFFERENT THINGS: NOT AT ALL
1. FEELING NERVOUS, ANXIOUS, OR ON EDGE: NOT AT ALL
7. FEELING AFRAID AS IF SOMETHING AWFUL MIGHT HAPPEN: NOT AT ALL
GAD7 TOTAL SCORE: 0

## 2017-03-20 ASSESSMENT — PATIENT HEALTH QUESTIONNAIRE - PHQ9: 5. POOR APPETITE OR OVEREATING: NOT AT ALL

## 2017-03-20 ASSESSMENT — PAIN SCALES - GENERAL: PAINLEVEL: SEVERE PAIN (7)

## 2017-03-20 NOTE — MR AVS SNAPSHOT
After Visit Summary   3/20/2017    Bel Reardon    MRN: [id number]           Patient Information     Date Of Birth          1940        Visit Information        Provider Department      3/20/2017 9:45 AM Jennie Rubio MD [facility] [place]        Today's Diagnoses     Chronic right-sided low back pain with right-sided sciatica    -  1    Type 2 diabetes mellitus without complication, without long-term current use of insulin (H)        Mixed hyperlipidemia        Benign essential hypertension          Care Instructions    Start fish oil 3000mg daily          Follow-ups after your visit        Follow-up notes from your care team     Return in about 3 months (around 6/20/2017) for DM2 -- come fasting.      Your next 10 appointments already scheduled     Mar 20, 2017  9:45 AM CDT   (Arrive by 9:30 AM)   SHORT with Jennie Rubio MD   [facility] [place] ([facility] [place] [facility])    [address]  [place] [address]   [phone]            Jun 28, 2017  8:30 AM CDT   LAB with HC LAB   [facility] ([facility] [place] [facility])    [address]  [place] [address]   [phone]            Jun 28, 2017  8:45 AM CDT   (Arrive by 8:30 AM)   SHORT with Jennie Rubio MD   [facility] [place] ([facility] [place] [facility])    [address]  [place] [address]   [phone]            Aug 08, 2017 10:30 AM CDT   (Arrive by 10:15 AM)   SHORT with Seven Kern MD   [facility] [place] [facility])    [address]  [place] [address]   [phone]              Who to contact     If you have questions or need follow up information about today's clinic visit or your schedule please contact [facility] directly at [phone].  Normal or non-critical lab and imaging results will be communicated to you by MyChart, letter or phone within 4 business days after the clinic has received the results. If you do not hear from us within 7 days, please  "contact the clinic through Cearna or phone. If you have a critical or abnormal lab result, we will notify you by phone as soon as possible.  Submit refill requests through Cearna or call your pharmacy and they will forward the refill request to us. Please allow 3 business days for your refill to be completed.          Additional Information About Your Visit        [employer] Information     Cearna lets you send messages to your doctor, view your test results, renew your prescriptions, schedule appointments and more. To sign up, go to [email or web address] . Click on \"Log in\" on the left side of the screen, which will take you to the Welcome page. Then click on \"Sign up Now\" on the right side of the page.     You will be asked to enter the access code listed below, as well as some personal information. Please follow the directions to create your username and password.     Your access code is: [email or web address]  Expires: 2017  5:37 PM     Your access code will  in 90 days. If you need help or a new code, please call your [place] clinic or [phone].        Care EveryWhere ID     This is your Care EveryWhere ID. This could be used by other organizations to access your [place] medical records  [id number]        Your Vitals Were     Pulse Temperature Height BMI (Body Mass Index)          64 97.8  F (36.6  C) (Tympanic) 5' 1\" (1.549 m) 33.07 kg/m2         Blood Pressure from Last 3 Encounters:   17 144/82   17 (!) 145/91   17 (!) 153/95    Weight from Last 3 Encounters:   17 175 lb (79.4 kg)   17 175 lb (79.4 kg)   17 175 lb (79.4 kg)              We Performed the Following     C FOOT EXAM  NO CHARGE     MR Lumbar Spine w/o Contrast        Primary Care Provider Office Phone # Fax #    Jennie Rubio [phone]       [facility] [place] [address]  [place] [address]        Thank you!     Thank you for choosing [facility] [place]  for " your care. Our goal is always to provide you with excellent care. Hearing back from our patients is one way we can continue to improve our services. Please take a few minutes to complete the written survey that you may receive in the mail after your visit with us. Thank you!             Your Updated Medication List - Protect others around you: Learn how to safely use, store and throw away your medicines at www.disposemymeds.org.          This list is accurate as of: 3/20/17  9:31 AM.  Always use your most recent med list.                   Brand Name Dispense Instructions for use    allopurinol 100 MG tablet    ZYLOPRIM    135 tablet    TAKE ONE & ONE-HALF TABLETS BY MOUTH DAILY       blood glucose monitoring lancets     1 Box    Use to test blood sugar 1 times daily or as directed.       blood glucose monitoring meter device kit     1 kit    1 kit by In Vitro route daily Use to test blood sugar 1 times daily or as directed.       cinnamon 500 MG Tabs      Take 1 tablet by mouth three times a week Reported on 3/7/2017       clobetasol 0.05 % ointment    TEMOVATE    45 g    Apply sparingly to affected area twice daily as needed.  Do not apply to face.       Fish Oil 1000 MG Cpdr      Take 1 capsule by mouth daily       gabapentin 100 MG capsule    NEURONTIN    40 capsule    Take 1 capsule (100 mg) by mouth 3 times daily       levothyroxine 25 MCG tablet    SYNTHROID/LEVOTHROID    90 tablet    TAKE 1 TABLET DAILY BY MOUT H - GENERIC FOR SYNTHROID       metFORMIN 500 MG tablet    GLUCOPHAGE    60 tablet    Take 500 mg with supper for the first week.  After the first week take 500 mg with breakfast and 500 mg with supper.       ONE TOUCH ULTRA test strip   Generic drug:  blood glucose monitoring     100 each    TEST BLOOD SURGARS ONCE A D AY       valsartan 80 MG tablet    DIOVAN    180 tablet    TAKE ONE TABLET TWO TIMES A DAY BY MOUTH - GENERIC FOR DIOVAN

## 2017-03-20 NOTE — PROGRESS NOTES
SUBJECTIVE:                                                    Bel Reardon is a 77 year old female who presents to clinic today for the following health issues:      Diabetes Follow-up    Patient is checking blood sugars: once daily.  Results are as follows:         am - 120s    Diabetic concerns: None     Symptoms of hypoglycemia (low blood sugar): none     Paresthesias (numbness or burning in feet) or sores: No     Date of last diabetic eye exam: 12/8/16     Hyperlipidemia Follow-Up      Rate your low fat/cholesterol diet?: fair    Taking statin?  No    Other lipid medications/supplements?:  Fish oil/Omega 3, dose 1000 mg without side effects     Hypertension Follow-up      Outpatient blood pressures are not being checked.    Low Salt Diet: low salt         Amount of exercise or physical activity: None    Problems taking medications regularly: No    Medication side effects: none    Diet: regular (no restrictions)    Back Pain      Duration: 2-3 months        Specific cause: overuse-painting house    Description:   Location of pain: low back right  Character of pain: sharp, spasms and intermittent  Pain radiation:radiates into the right buttocks and radiates into the right leg  New numbness or weakness in legs, not attributed to pain:  YES    Intensity: Currently 7/10    History:   Pain interferes with job: Not applicable  History of back problems: no prior back problems  Any previous MRI or X-rays: Yes- at [facility].  Date 1/11/17  Sees a specialist for back pain:  No  Therapies tried without relief: acetaminophen (Tylenol), massage, Physical Therapy and steroid injection    Alleviating factors:   Improved by: none      Precipitating factors:  Worsened by: Standing and Walking    Functional and Psychosocial Screen (Susy STarT Back):      Not performed today       Accompanying Signs & Symptoms:  Risk of Fracture:  None  Risk of Cauda Equina:  None  Risk of Infection:  None  Risk of Cancer:  None  Risk of  "Ankylosing Spondylitis:  Onset at age <35, male, AND morning back stiffness. no                Problem list and histories reviewed & adjusted, as indicated.  Additional history: as documented    Current Outpatient Prescriptions   Medication Sig Dispense Refill     Omega-3 Fatty Acids (FISH OIL) 1000 MG CPDR Take 1 capsule by mouth daily       clobetasol (TEMOVATE) 0.05 % ointment Apply sparingly to affected area twice daily as needed.  Do not apply to face. 45 g 3     blood glucose monitoring (ONE TOUCH ULTRA MINI) meter device kit 1 kit by In Vitro route daily Use to test blood sugar 1 times daily or as directed. 1 kit 0     gabapentin (NEURONTIN) 100 MG capsule Take 1 capsule (100 mg) by mouth 3 times daily 40 capsule 1     levothyroxine (SYNTHROID/LEVOTHROID) 25 MCG tablet TAKE 1 TABLET DAILY BY MOUT H - GENERIC FOR SYNTHROID 90 tablet 3     metFORMIN (GLUCOPHAGE) 500 MG tablet Take 500 mg with supper for the first week.  After the first week take 500 mg with breakfast and 500 mg with supper. 60 tablet 3     valsartan (DIOVAN) 80 MG tablet TAKE ONE TABLET TWO TIMES A DAY BY MOUTH - GENERIC FOR DIOVAN 180 tablet 1     allopurinol (ZYLOPRIM) 100 MG tablet TAKE ONE & ONE-HALF TABLETS BY MOUTH DAILY 135 tablet 0     ONE TOUCH ULTRA test strip TEST BLOOD SURGARS ONCE A D  each 2     blood glucose monitoring (ONE TOUCH DELICA) lancets Use to test blood sugar 1 times daily or as directed. 1 Box 12     cinnamon 500 MG TABS Take 1 tablet by mouth three times a week Reported on 3/7/2017       Labs reviewed in EPIC    ROS:  Constitutional, HEENT, cardiovascular, pulmonary, gi and gu systems are negative, except as otherwise noted.    OBJECTIVE:                                                    /82  Pulse 64  Temp 97.8  F (36.6  C) (Tympanic)  Ht 5' 1\" (1.549 m)  Wt 175 lb (79.4 kg)  BMI 33.07 kg/m2  Body mass index is 33.07 kg/(m^2).  GENERAL APPEARANCE: healthy, alert, no distress and over weight  RESP: lungs " clear to auscultation - no rales, rhonchi or wheezes  CV: regular rates and rhythm, normal S1 S2, no S3 or S4 and no murmur, click or rub  ABDOMEN: soft, nontender, without hepatosplenomegaly or masses, bowel sounds normal and obese  SKIN: dry skin  DIABETIC FOOT EXAM: normal DP and PT pulses, no trophic changes or ulcerative lesions, normal sensory exam, normal monofilament exam and dry cracking heels and feet  PSYCH: mentation appears normal and affect normal/bright       ASSESSMENT/PLAN:                                                    1. Chronic right-sided low back pain with right-sided sciatica  Likely will need ABHAY  - MR Lumbar Spine w/o Contrast; Future  - MR Lumbar Spine w/o Contrast    2. Type 2 diabetes mellitus without complication, without long-term current use of insulin (H)  F/u 3 mos  - C FOOT EXAM  NO CHARGE    3. Mixed hyperlipidemia  Come fasting next visit    4. Benign essential hypertension  stable    Patient was agreeable to this plan and had no further questions.  See Patient Instructions    Jennie Rubio MD  [facility]

## 2017-03-20 NOTE — NURSING NOTE
"Chief Complaint   Patient presents with     Diabetes     Back Pain       Initial /84  Pulse 64  Temp 97.8  F (36.6  C) (Tympanic)  Ht 5' 1\" (1.549 m)  Wt 175 lb (79.4 kg)  BMI 33.07 kg/m2 Estimated body mass index is 33.07 kg/(m^2) as calculated from the following:    Height as of this encounter: 5' 1\" (1.549 m).    Weight as of this encounter: 175 lb (79.4 kg).  Medication Reconciliation: complete   Fatimah Lopez    "

## 2017-03-21 ENCOUNTER — HOSPITAL ENCOUNTER (OUTPATIENT)
Dept: MRI IMAGING | Facility: HOSPITAL | Age: 77
Discharge: HOME | End: 2017-03-21
Attending: FAMILY MEDICINE | Admitting: FAMILY MEDICINE
Payer: MEDICARE

## 2017-03-21 PROCEDURE — 72148 MRI LUMBAR SPINE W/O DYE: CPT | Mod: TC

## 2017-03-21 ASSESSMENT — PATIENT HEALTH QUESTIONNAIRE - PHQ9: SUM OF ALL RESPONSES TO PHQ QUESTIONS 1-9: 0

## 2017-03-21 ASSESSMENT — ANXIETY QUESTIONNAIRES: GAD7 TOTAL SCORE: 0

## 2017-03-22 ENCOUNTER — TELEPHONE (OUTPATIENT)
Dept: FAMILY MEDICINE | Facility: OTHER | Age: 77
End: 2017-03-22

## 2017-03-22 DIAGNOSIS — M54.41 CHRONIC RIGHT-SIDED LOW BACK PAIN WITH RIGHT-SIDED SCIATICA: ICD-10-CM

## 2017-03-22 DIAGNOSIS — M47.816 FACET ARTHRITIS OF LUMBAR REGION: Primary | ICD-10-CM

## 2017-03-22 DIAGNOSIS — G89.29 CHRONIC RIGHT-SIDED LOW BACK PAIN WITH RIGHT-SIDED SCIATICA: ICD-10-CM

## 2017-03-22 DIAGNOSIS — M71.30 SYNOVIAL CYST: ICD-10-CM

## 2017-03-22 NOTE — TELEPHONE ENCOUNTER
Neurology in [place] does not see pt's for back problems, pt must see neurosurgery.  Referral changed and pt was notified.

## 2017-03-22 NOTE — TELEPHONE ENCOUNTER
Pt calls back again and states that she would now like to see neurologist Dr Edwards at [facility].  Please sign order if ok.

## 2017-03-22 NOTE — TELEPHONE ENCOUNTER
Pt would like to see Dr Cabrales at [facility] for her back and have injection there.  Please place order.

## 2017-04-07 NOTE — PROGRESS NOTES
"Outpatient Physical Therapy Discharge Note     Patient: Bel Reardon  : 1940    Beginning/End Dates of Reporting Period:  2/10/2017 to 3/2/2017    Referring Provider: Dr Rubio    Therapy Diagnosis: sacroiliitis, R LBP     Client Self Report: Per patient's last report on 3/2/2017: Seen from 1:00-1:25. Patient reports having pain today again at 4/10. She doesn't have the pain all the time and some days she has pain most of the day. Most of her \"jolts\" of pain down the right leg last for only a few seconds and seem to occur when she is always sitting for too long or moves the wrong way when she is sitting.     Objective Measurements: no objective measure taken this date      Goals:  Goal Identifier STG 1   Goal Description Patient will display independence and compliance with her HEP in order to return to her prior level of function and decrease her pain   Target Date 17   Date Met   (DC patient was not progressing, return to MD)   Progress:     Goal Identifier LTG 1   Goal Description Patient will be able to stand for longer than 1 hour without pain greater than 2/10 in order to improve her efficiency and functional mobility with ADL's and other tasks at home.    Target Date 17   Date Met   (DC patient was not progressing, return to MD)   Progress:     Goal Identifier LTG 2   Goal Description Patient will be able to walk for 10 minutes at a time without requiring a rest break in order to improve her functional mobility and safety in the home and community   Target Date 17   Date Met   (DC patient was not progressing, return to MD)   Progress:       Progress Toward Goals:   Progress this reporting period: DC patient was not progressing, return to MD    Plan:  Discharge from therapy.    Discharge:    Reason for Discharge: Patient has failed to schedule further appointments.  Medicare G-code: Patient did not attend a final scheduled session prior to discharge. Unable to determine discharge " functional status.    Equipment Issued: None    Discharge Plan: Patient to continue home program.

## 2017-04-26 DIAGNOSIS — E11.9 TYPE 2 DIABETES MELLITUS WITHOUT COMPLICATION, WITHOUT LONG-TERM CURRENT USE OF INSULIN (H): ICD-10-CM

## 2017-04-27 ENCOUNTER — TRANSFERRED RECORDS (OUTPATIENT)
Dept: HEALTH INFORMATION MANAGEMENT | Facility: HOSPITAL | Age: 77
End: 2017-04-27

## 2017-04-27 NOTE — TELEPHONE ENCOUNTER
metformin         Last Written Prescription Date: 12/14/16  Last Fill Quantity: 60, # refills: 3  Last Office Visit with FMG, UMP or  Health prescribing provider:  3/20/17   Next 5 appointments (look out 90 days)     Jun 28, 2017  8:45 AM CDT   (Arrive by 8:30 AM)   SHORT with Jennie Rubio MD   [facility] ([facility])    [address]  [facility] [address]   [phone]                   BP Readings from Last 3 Encounters:   03/20/17 144/82   03/07/17 (!) 145/91   02/21/17 (!) 153/95     Lab Results   Component Value Date    MICROL 24 06/09/2016     Lab Results   Component Value Date    UMALCR 21.93 06/09/2016     Creatinine   Date Value Ref Range Status   03/20/2017 0.83 0.52 - 1.04 mg/dL Final   ]  GFR Estimate   Date Value Ref Range Status   03/20/2017 67 >60 mL/min/1.7m2 Final     Comment:     Non  GFR Calc   12/14/2016 77 >60 mL/min/1.7m2 Final     Comment:     Non  GFR Calc   06/09/2016 71 >60 mL/min/1.7m2 Final     Comment:     Non  GFR Calc     GFR Estimate If Black   Date Value Ref Range Status   03/20/2017 82 >60 mL/min/1.7m2 Final     Comment:      GFR Calc   12/14/2016 >90   GFR Calc   >60 mL/min/1.7m2 Final   06/09/2016 86 >60 mL/min/1.7m2 Final     Comment:      GFR Calc     Lab Results   Component Value Date    CHOL 282 03/20/2017     Lab Results   Component Value Date    HDL 53 03/20/2017     Lab Results   Component Value Date     03/20/2017     Lab Results   Component Value Date    TRIG 340 03/20/2017     Lab Results   Component Value Date    CHOLHDLRATIO 5.7 08/18/2015     Lab Results   Component Value Date    AST 42 03/20/2017     Lab Results   Component Value Date    ALT 55 03/20/2017     Lab Results   Component Value Date    A1C 6.5 03/20/2017    A1C 6.7 12/14/2016    A1C 6.8 09/15/2016    A1C 6.8 06/09/2016    A1C 6.8 03/16/2016     Potassium   Date Value Ref  Range Status   03/20/2017 4.4 3.4 - 5.3 mmol/L Final

## 2017-05-30 DIAGNOSIS — E11.9 TYPE 2 DIABETES MELLITUS WITHOUT COMPLICATION (H): ICD-10-CM

## 2017-06-01 NOTE — TELEPHONE ENCOUNTER
Test stripts         Last Written Prescription Date: 6/28/16  Last Fill Quantity: 100, # refills: 2  Last Office Visit with FMG, UMP or  Health prescribing provider:  3/20/17   Next 5 appointments (look out 90 days)     Jun 28, 2017  8:45 AM CDT   (Arrive by 8:30 AM)   SHORT with Jennie Rubio MD   [facility] ([facility])    [address]  [facility] [address]   [phone]                   BP Readings from Last 3 Encounters:   03/20/17 144/82   03/07/17 (!) 145/91   02/21/17 (!) 153/95     Lab Results   Component Value Date    MICROL 24 06/09/2016     Lab Results   Component Value Date    UMALCR 21.93 06/09/2016     Creatinine   Date Value Ref Range Status   03/20/2017 0.83 0.52 - 1.04 mg/dL Final   ]  GFR Estimate   Date Value Ref Range Status   03/20/2017 67 >60 mL/min/1.7m2 Final     Comment:     Non  GFR Calc   12/14/2016 77 >60 mL/min/1.7m2 Final     Comment:     Non  GFR Calc   06/09/2016 71 >60 mL/min/1.7m2 Final     Comment:     Non  GFR Calc     GFR Estimate If Black   Date Value Ref Range Status   03/20/2017 82 >60 mL/min/1.7m2 Final     Comment:      GFR Calc   12/14/2016 >90   GFR Calc   >60 mL/min/1.7m2 Final   06/09/2016 86 >60 mL/min/1.7m2 Final     Comment:      GFR Calc     Lab Results   Component Value Date    CHOL 282 03/20/2017     Lab Results   Component Value Date    HDL 53 03/20/2017     Lab Results   Component Value Date     03/20/2017     Lab Results   Component Value Date    TRIG 340 03/20/2017     Lab Results   Component Value Date    CHOLHDLRATIO 5.7 08/18/2015     Lab Results   Component Value Date    AST 42 03/20/2017     Lab Results   Component Value Date    ALT 55 03/20/2017     Lab Results   Component Value Date    A1C 6.5 03/20/2017    A1C 6.7 12/14/2016    A1C 6.8 09/15/2016    A1C 6.8 06/09/2016    A1C 6.8 03/16/2016     Potassium   Date Value Ref  Range Status   03/20/2017 4.4 3.4 - 5.3 mmol/L Final

## 2017-06-07 DIAGNOSIS — I10 BENIGN ESSENTIAL HYPERTENSION: ICD-10-CM

## 2017-06-08 RX ORDER — VALSARTAN 80 MG/1
TABLET ORAL
Qty: 180 TABLET | Refills: 1 | Status: SHIPPED | OUTPATIENT
Start: 2017-06-08 | End: 2017-12-04

## 2017-06-19 DIAGNOSIS — E11.9 DIABETES MELLITUS (H): Primary | ICD-10-CM

## 2017-06-28 ENCOUNTER — OFFICE VISIT (OUTPATIENT)
Dept: FAMILY MEDICINE | Facility: OTHER | Age: 77
End: 2017-06-28
Attending: FAMILY MEDICINE
Payer: COMMERCIAL

## 2017-06-28 VITALS
WEIGHT: 170 LBS | DIASTOLIC BLOOD PRESSURE: 84 MMHG | HEART RATE: 60 BPM | TEMPERATURE: 98 F | SYSTOLIC BLOOD PRESSURE: 138 MMHG | OXYGEN SATURATION: 96 % | BODY MASS INDEX: 32.12 KG/M2

## 2017-06-28 DIAGNOSIS — I10 BENIGN ESSENTIAL HYPERTENSION: ICD-10-CM

## 2017-06-28 DIAGNOSIS — E11.9 TYPE 2 DIABETES MELLITUS WITHOUT COMPLICATION, WITHOUT LONG-TERM CURRENT USE OF INSULIN (H): Primary | ICD-10-CM

## 2017-06-28 DIAGNOSIS — Z71.89 ADVANCE CARE PLANNING: Chronic | ICD-10-CM

## 2017-06-28 DIAGNOSIS — E78.2 MIXED HYPERLIPIDEMIA: ICD-10-CM

## 2017-06-28 DIAGNOSIS — E11.9 TYPE 2 DIABETES MELLITUS WITHOUT COMPLICATION, WITHOUT LONG-TERM CURRENT USE OF INSULIN (H): ICD-10-CM

## 2017-06-28 DIAGNOSIS — E11.9 DIABETES MELLITUS (H): ICD-10-CM

## 2017-06-28 PROBLEM — H25.819 COMBINED FORMS OF AGE-RELATED CATARACT: Status: ACTIVE | Noted: 2017-06-28

## 2017-06-28 LAB
CREAT UR-MCNC: 141 MG/DL
EST. AVERAGE GLUCOSE BLD GHB EST-MCNC: 146 MG/DL
HBA1C MFR BLD: 6.7 % (ref 4.3–6)
MICROALBUMIN UR-MCNC: 22 MG/L
MICROALBUMIN/CREAT UR: 15.46 MG/G CR (ref 0–25)

## 2017-06-28 PROCEDURE — 99214 OFFICE O/P EST MOD 30 MIN: CPT | Performed by: FAMILY MEDICINE

## 2017-06-28 PROCEDURE — 40000788 ZZHCL STATISTIC ESTIMATED AVERAGE GLUCOSE: Mod: ZL | Performed by: FAMILY MEDICINE

## 2017-06-28 PROCEDURE — 82043 UR ALBUMIN QUANTITATIVE: CPT | Mod: ZL | Performed by: FAMILY MEDICINE

## 2017-06-28 PROCEDURE — 83036 HEMOGLOBIN GLYCOSYLATED A1C: CPT | Mod: ZL | Performed by: FAMILY MEDICINE

## 2017-06-28 PROCEDURE — 99212 OFFICE O/P EST SF 10 MIN: CPT

## 2017-06-28 PROCEDURE — 36415 COLL VENOUS BLD VENIPUNCTURE: CPT | Mod: ZL | Performed by: FAMILY MEDICINE

## 2017-06-28 ASSESSMENT — PAIN SCALES - GENERAL: PAINLEVEL: NO PAIN (0)

## 2017-06-28 NOTE — PROGRESS NOTES
SUBJECTIVE:                                                    Bel Reardon is a 77 year old female who presents to clinic today for the following health issues:        Diabetes Follow-up    Patient is checking blood sugars: once daily.  Results are as follows:         am - 140S    Diabetic concerns: None     Symptoms of hypoglycemia (low blood sugar): none     Paresthesias (numbness or burning in feet) or sores: No     Date of last diabetic eye exam: due     Hyperlipidemia Follow-Up      Rate your low fat/cholesterol diet?: good    Taking statin?  No    Other lipid medications/supplements?:  Fish oil/Omega 3, dose 1000MG without side effects    Hypertension Follow-up      Outpatient blood pressures are not being checked.    Low Salt Diet: low salt      Amount of exercise or physical activity: 2-3 DAYS     Problems taking medications regularly: No    Medication side effects: none    Diet: regular (no restrictions) and low salt      Problem list and histories reviewed & adjusted, as indicated.  Additional history: as documented    Current Outpatient Prescriptions   Medication Sig Dispense Refill     valsartan (DIOVAN) 80 MG tablet TAKE ONE TABLET TWO TIMES A DAY BY MOUTH - GENERIC FOR DIOVAN 180 tablet 1     ONE TOUCH ULTRA test strip TEST BLOOD SURGARS ONCE A  each 3     metFORMIN (GLUCOPHAGE) 500 MG tablet TAKE 1 TABLET WITH SUPPER FOR THE FIRST WEEK, AFTER THE FIRST WEEK TAKE 1 TABLET WITH BREAKFAST AND 1 TABLET WITH SUPPER. 60 tablet 3     Omega-3 Fatty Acids (FISH OIL) 1000 MG CPDR Take 1 capsule by mouth daily       clobetasol (TEMOVATE) 0.05 % ointment Apply sparingly to affected area twice daily as needed.  Do not apply to face. 45 g 3     blood glucose monitoring (ONE TOUCH ULTRA MINI) meter device kit 1 kit by In Vitro route daily Use to test blood sugar 1 times daily or as directed. 1 kit 0     levothyroxine (SYNTHROID/LEVOTHROID) 25 MCG tablet TAKE 1 TABLET DAILY BY MOUT H - GENERIC FOR  SYNTHROID 90 tablet 3     allopurinol (ZYLOPRIM) 100 MG tablet TAKE ONE & ONE-HALF TABLETS BY MOUTH DAILY 135 tablet 0     blood glucose monitoring (ONE TOUCH DELICA) lancets Use to test blood sugar 1 times daily or as directed. 1 Box 12     Labs reviewed in EPIC    Reviewed and updated as needed this visit by clinical staff  Tobacco  Meds  Med Hx  Surg Hx  Fam Hx  Soc Hx      Reviewed and updated as needed this visit by Provider         ROS:  Constitutional, HEENT, cardiovascular, pulmonary, gi and gu systems are negative, except as otherwise noted.    OBJECTIVE:                                                    /84 (BP Location: Left arm, Patient Position: Chair, Cuff Size: Adult Regular)  Pulse 60  Temp 98  F (36.7  C) (Tympanic)  Wt 170 lb (77.1 kg)  SpO2 96%  Breastfeeding? No  BMI 32.12 kg/m2  Body mass index is 32.12 kg/(m^2).  GENERAL APPEARANCE: healthy, alert and no distress  RESP: lungs clear to auscultation - no rales, rhonchi or wheezes  CV: regular rates and rhythm, normal S1 S2, no S3 or S4 and no murmur, click or rub  ABDOMEN: soft, nontender, without hepatosplenomegaly or masses and bowel sounds normal  PSYCH: mentation appears normal and affect normal/bright       ASSESSMENT/PLAN:                                                    1. Type 2 diabetes mellitus without complication, without long-term current use of insulin (H)  F/u 4 mos  Increase activity  - Hemoglobin A1c; Standing    2. Benign essential hypertension      3. Mixed hyperlipidemia      4. Advance care planning      Patient was agreeable to this plan and had no further questions.  See Patient Instructions    Jennie Rubio MD  [facility]

## 2017-06-28 NOTE — NURSING NOTE
"Chief Complaint   Patient presents with     Diabetes       Initial /84 (BP Location: Left arm, Patient Position: Chair, Cuff Size: Adult Regular)  Pulse 60  Temp 98  F (36.7  C) (Tympanic)  Wt 170 lb (77.1 kg)  SpO2 96%  Breastfeeding? No  BMI 32.12 kg/m2 Estimated body mass index is 32.12 kg/(m^2) as calculated from the following:    Height as of 3/20/17: 5' 1\" (1.549 m).    Weight as of this encounter: 170 lb (77.1 kg).  Medication Reconciliation: complete   Mayra Kim CMA(AAMA)     "

## 2017-06-28 NOTE — MR AVS SNAPSHOT
After Visit Summary   6/28/2017    Bel Reardon    MRN: [id number]           Patient Information     Date Of Birth          1940        Visit Information        Provider Department      6/28/2017 8:45 AM Jennie Rubio MD [facility]        Today's Diagnoses     Type 2 diabetes mellitus without complication, without long-term current use of insulin (H)    -  1    Benign essential hypertension        Mixed hyperlipidemia        Advance care planning           Follow-ups after your visit        Your next 10 appointments already scheduled     Sep 21, 2017  1:30 PM CDT   (Arrive by 1:15 PM)   SHORT with Seven Kern MD   [facility] ([facility] [place] )    [address]  [place] [address]   [phone]            Nov 01, 2017  8:45 AM CDT   (Arrive by 8:30 AM)   SHORT with Jennie Rubio MD   [facility] ([facility] [place] )    [address]  [place] [address]   [phone]              Future tests that were ordered for you today     Open Standing Orders        Priority Remaining Interval Expires Ordered    Hemoglobin A1c Routine 3/4  6/18/2018 6/18/2017            Who to contact     If you have questions or need follow up information about today's clinic visit or your schedule please contact [facility] directly at [phone].  Normal or non-critical lab and imaging results will be communicated to you by MyChart, letter or phone within 4 business days after the clinic has received the results. If you do not hear from us within 7 days, please contact the clinic through MyChart or phone. If you have a critical or abnormal lab result, we will notify you by phone as soon as possible.  Submit refill requests through [employer] or call your pharmacy and they will forward the refill request to us. Please allow 3 business days for your refill to be completed.          Additional Information About Your  Visit        Care EveryWhere ID     This is your Care EveryWhere ID. This could be used by other organizations to access your [facility] medical records  [id number]        Your Vitals Were     Pulse Temperature Pulse Oximetry Breastfeeding? BMI (Body Mass Index)       60 98  F (36.7  C) (Tympanic) 96% No 32.12 kg/m2        Blood Pressure from Last 3 Encounters:   06/28/17 138/84   03/20/17 144/82   03/07/17 (!) 145/91    Weight from Last 3 Encounters:   06/28/17 170 lb (77.1 kg)   03/20/17 175 lb (79.4 kg)   03/07/17 175 lb (79.4 kg)               Primary Care Provider Office Phone # Fax #    Jennie Rubio [phone]       [facility] [place] [address]  [facility] [address]        Equal Access to Services     [facility] : Hadii aad ku hadasho Soomaali, waaxda luqadaha, qaybta kaalmada adeegyada, waxay idiin hayaan brittany betancourt . So [facility] [phone].    ATENCIÓN: Si habla español, tiene a guaman disposición servicios gratuitos de asistencia lingüística. Llame al [phone].    We comply with applicable federal civil rights laws and [place] laws. We do not discriminate on the basis of race, color, national origin, age, disability sex, sexual orientation or gender identity.            Thank you!     Thank you for choosing [facility]  for your care. Our goal is always to provide you with excellent care. Hearing back from our patients is one way we can continue to improve our services. Please take a few minutes to complete the written survey that you may receive in the mail after your visit with us. Thank you!             Your Updated Medication List - Protect others around you: Learn how to safely use, store and throw away your medicines at www.disposemymeds.org.          This list is accurate as of: 6/28/17  8:56 AM.  Always use your most recent med list.                   Brand Name Dispense Instructions for use Diagnosis    allopurinol 100 MG tablet    ZYLOPRIM     135 tablet    TAKE ONE & ONE-HALF TABLETS BY MOUTH DAILY    Idiopathic gout, unspecified chronicity, unspecified site       blood glucose monitoring lancets     1 Box    Use to test blood sugar 1 times daily or as directed.    Type 2 diabetes mellitus without complication (H)       blood glucose monitoring meter device kit     1 kit    1 kit by In Vitro route daily Use to test blood sugar 1 times daily or as directed.    Type 2 diabetes mellitus without complication, without long-term current use of insulin (H)       clobetasol 0.05 % ointment    TEMOVATE    45 g    Apply sparingly to affected area twice daily as needed.  Do not apply to face.    Lichen sclerosus et atrophicus of the vulva       Fish Oil 1000 MG Cpdr      Take 1 capsule by mouth daily        levothyroxine 25 MCG tablet    SYNTHROID/LEVOTHROID    90 tablet    TAKE 1 TABLET DAILY BY MOUT H - GENERIC FOR SYNTHROID    Other specified hypothyroidism       metFORMIN 500 MG tablet    GLUCOPHAGE    60 tablet    TAKE 1 TABLET WITH SUPPER FOR THE FIRST WEEK, AFTER THE FIRST WEEK TAKE 1 TABLET WITH BREAKFAST AND 1 TABLET WITH SUPPER.    Type 2 diabetes mellitus without complication, without long-term current use of insulin (H)       ONE TOUCH ULTRA test strip   Generic drug:  blood glucose monitoring     100 each    TEST BLOOD SURGARS ONCE A DAY    Type 2 diabetes mellitus without complication (H)       valsartan 80 MG tablet    DIOVAN    180 tablet    TAKE ONE TABLET TWO TIMES A DAY BY MOUTH - GENERIC FOR DIOVAN    Benign essential hypertension

## 2017-06-30 DIAGNOSIS — M10.00 IDIOPATHIC GOUT, UNSPECIFIED CHRONICITY, UNSPECIFIED SITE: ICD-10-CM

## 2017-06-30 RX ORDER — ALLOPURINOL 100 MG/1
TABLET ORAL
Qty: 135 TABLET | Refills: 1 | Status: SHIPPED | OUTPATIENT
Start: 2017-06-30 | End: 2018-03-27

## 2017-06-30 NOTE — TELEPHONE ENCOUNTER
Zyloprim       Last Written Prescription Date: 8/15/16  Last Fill Quantity: 135, # refills: 0  Last Office Visit with FMG, UMP or  Health prescribing provider:  6/28/17   Next 5 appointments (look out 90 days)     Sep 21, 2017  1:30 PM CDT   (Arrive by 1:15 PM)   SHORT with Seven Kern MD   [facility] [place] ([facility] [place] )    [address]  [place] [address]   [phone]                   Uric Acid   Date Value Ref Range Status   04/21/2013 6.5 (H) 2.6 - 6.0 mg/dL Final   ]  Creatinine   Date Value Ref Range Status   03/20/2017 0.83 0.52 - 1.04 mg/dL Final   ]  Lab Results   Component Value Date    WBC 5.9 01/07/2016     Lab Results   Component Value Date    RBC 4.71 01/07/2016     Lab Results   Component Value Date    HGB 14.7 01/07/2016     Lab Results   Component Value Date    HCT 42.2 01/07/2016     No components found for: MCT  Lab Results   Component Value Date    MCV 90 01/07/2016     Lab Results   Component Value Date    MCH 31.2 01/07/2016     Lab Results   Component Value Date    MCHC 34.8 01/07/2016     Lab Results   Component Value Date    RDW 13.8 01/07/2016     Lab Results   Component Value Date     01/07/2016     Lab Results   Component Value Date    AST 42 03/20/2017     Lab Results   Component Value Date    ALT 55 03/20/2017

## 2017-07-25 ENCOUNTER — OFFICE VISIT (OUTPATIENT)
Dept: FAMILY MEDICINE | Facility: OTHER | Age: 77
End: 2017-07-25
Attending: NURSE PRACTITIONER
Payer: COMMERCIAL

## 2017-07-25 VITALS
TEMPERATURE: 98.1 F | BODY MASS INDEX: 33.99 KG/M2 | DIASTOLIC BLOOD PRESSURE: 80 MMHG | SYSTOLIC BLOOD PRESSURE: 120 MMHG | OXYGEN SATURATION: 97 % | HEART RATE: 67 BPM | HEIGHT: 61 IN | WEIGHT: 180 LBS | RESPIRATION RATE: 16 BRPM

## 2017-07-25 DIAGNOSIS — K57.30 DIVERTICULOSIS OF LARGE INTESTINE WITHOUT HEMORRHAGE: Primary | ICD-10-CM

## 2017-07-25 PROCEDURE — 99212 OFFICE O/P EST SF 10 MIN: CPT

## 2017-07-25 PROCEDURE — 99213 OFFICE O/P EST LOW 20 MIN: CPT | Performed by: NURSE PRACTITIONER

## 2017-07-25 ASSESSMENT — ANXIETY QUESTIONNAIRES
4. TROUBLE RELAXING: NOT AT ALL
3. WORRYING TOO MUCH ABOUT DIFFERENT THINGS: NOT AT ALL
1. FEELING NERVOUS, ANXIOUS, OR ON EDGE: NOT AT ALL
6. BECOMING EASILY ANNOYED OR IRRITABLE: NOT AT ALL
2. NOT BEING ABLE TO STOP OR CONTROL WORRYING: NOT AT ALL
5. BEING SO RESTLESS THAT IT IS HARD TO SIT STILL: NOT AT ALL
GAD7 TOTAL SCORE: 0
IF YOU CHECKED OFF ANY PROBLEMS ON THIS QUESTIONNAIRE, HOW DIFFICULT HAVE THESE PROBLEMS MADE IT FOR YOU TO DO YOUR WORK, TAKE CARE OF THINGS AT HOME, OR GET ALONG WITH OTHER PEOPLE: NOT DIFFICULT AT ALL
7. FEELING AFRAID AS IF SOMETHING AWFUL MIGHT HAPPEN: NOT AT ALL

## 2017-07-25 ASSESSMENT — PAIN SCALES - GENERAL: PAINLEVEL: NO PAIN (0)

## 2017-07-25 NOTE — PATIENT INSTRUCTIONS
Diverticulitis    Some people get pouches along the wall of the colon as they get older. The pouches, called diverticuli, usually cause no symptoms. If the pouches become blocked, you can get an infection. This infection is called diverticulitis. It causes pain in your lower abdomen and fever. If not treated, it can become a serious condition, causing an abscess to form inside the pouch. The abscess may block the intestinal tract even or rupture, spreading infection throughout the abdomen.  When treatment is started early, oral antibiotics alone may be enough to cure diverticulitis. This method is tried first. But, if you don't improve or if your condition gets worse while using oral antibiotics, you may need to be admitted to the hospital for IV antibiotics. Severe cases may require surgery.  Home care  The following guidelines will help you care for yourself at home:    During the acute illness, rest and follow your healthcare provider's instructions about diet. Sometimes you will need to follow a clear liquid diet to rest your bowel. Once your symptoms are better, you may be told to follow a low-fiber diet for some time. Include foods like:    Flake cereal, mashed potatoes, pancakes, waffles, pasta, white bread, rice, applesauce, bananas, eggs, fish, poultry, tofu, and cooked soft vegetables    Take antibiotics exactly as instructed. Don't miss any doses or stop taking the medication, even if you feel better.    Monitor your temperature and tell your healthcare provider if you have rising temperatures.  Preventing future attacks  Once you have an episode of diverticulitis, you are at risk for having it again. After you have recovered from this episode, you may be able to lower your risk by eating a high-fiber diet (20 gm/day to 35 gm/day of fiber). This cleans out the colon pouches that already exist and may prevent new ones from forming. Foods high in fiber include fresh fruits and edible peelings, raw or  lightly cooked vegetables, whole grain cereals and breads, dried beans and peas, and bran.  Other steps that can help prevent future attacks include:    Take your medicines, such as antibiotics, as your healthcare provider says.    Drink 6 to 8 glasses of water every day, unless told otherwise.    Use a heating pad or hot water bottle to help abdominal cramping or pain.    Begin an exercise program. Ask your healthcare provider how to get started. You can benefit from simple activities such as walking or gardening.    Treat diarrhea with a bland diet. Start with liquids only; then slowly add fiber over time.    Watch for changes in your bowel movements (constipation to diarrhea). Avoid constipation by eating a high fiber diet and taking a stool softener if needed.    Get plenty of rest and sleep.  Follow-up care  Follow up with your healthcare provider as advised or sooner if you are not getting better in the next 2 days.  When to seek medical advice  Call your healthcare provider right away if any of these occur:    Fever of 100.4 F (38 C) or higher, or as directed by your healthcare provider    Repeated vomiting or swelling of the abdomen    Weakness, dizziness, light-headedness    Pain in your abdomen that gets worse, severe, or spreads to your back    Pain that moves to the right lower abdomen    Rectal bleeding (stools that are red, black or maroon color)    Unexpected vaginal bleeding  Date Last Reviewed: 9/1/2016 2000-2017 [employer]. [address] [place] [address]. All rights reserved. This information is not intended as a substitute for professional medical care. Always follow your healthcare professional's instructions.      Eating a High-Fiber Diet  Fiber is what gives strength and structure to plants. Most grains, beans, vegetables, and fruits contain fiber. Foods rich in fiber are often low in calories and fat, and they fill you up more. They may also reduce your risks for  certain health problems. To find out the amount of fiber in canned, packaged, or frozen foods, read the Nutrition Facts label. It tells you how much fiber is in a serving.    Types of fiber and their benefits  There are two types of fiber: insoluble and soluble. They both aid digestion and help you maintain a healthy weight.    Insoluble fiber. This is found in whole grains, cereals, certain fruits and vegetables such as apple skin, corn, and carrots. Insoluble fiber may prevent constipation and reduce the risk for certain types of cancer.    Soluble fiber. This type of fiber is in oats, beans, and certain fruits and vegetables such as strawberries and peas. Soluble fiber can reduce cholesterol, which may help lower the risk for heart disease. It also helps control blood sugar levels.  Look for high-fiber foods  Try these foods to add fiber to your diet:    Whole-grain breads and cereals. Try to eat 6 to 8 ounces a day. Include wheat and oat bran cereals, whole-wheat muffins or toast, and corn tortillas in your meals.    Fruits. Try to eat 2 cups a day. Apples, oranges, strawberries, pears, and bananas are good sources. (Note: Fruit juice is low in fiber.)    Vegetables. Try to eat at least 2.5 cups a day. Add asparagus, carrots, broccoli, peas, and corn to your meals.    Beans. One cup of cooked lentils gives you over 15 grams of fiber. Try navy beans, lentils, and chickpeas.    Seeds. A small handful of seeds gives you about 3 grams of fiber. Try sunflower seeds.  Keep track of your fiber  Keep track of how much fiber you eat. Start by reading food labels. Then eat a variety of foods high in fiber. As you begin to eat more fiber, ask your healthcare provider how much water you should be drinking to keep your digestive system working smoothly.  You should aim for a certain amount of fiber in your diet each day. If you are a woman, that amount is between 25 and 28 grams per day. Men should aim for 30 to 33 grams per  day. After age 50, your daily fiber needs drop to 22 grams for women and 28 grams for men.  Before you reach for the fiber supplements, think about this. Fiber is found naturally in healthy whole foods. It gives you that feeling of fullness after you eat. Taking fiber supplements or eating fiber-enriched foods will not give you this full feeling.  Your fiber intake is a good measure for the quality of your overall diet. If you are missing out on your daily amount of fiber, you may be lacking other important nutrients as well.  Date Last Reviewed: 5/11/2015 2000-2017 [employer]. [address] [place] [address]. All rights reserved. This information is not intended as a substitute for professional medical care. Always follow your healthcare professional's instructions.

## 2017-07-25 NOTE — PROGRESS NOTES
SUBJECTIVE:                                                    Bel Reardon is a 77 year old female who presents to clinic today for the following health issues:      Abdominal Pain      Duration: 1 day - cleared now    Description (location/character/radiation): left side throbbing pain, thinks related to diverticulitis       Associated flank pain: yes    Intensity:  moderate    Accompanying signs and symptoms:        Fever/Chills: no        Gas/Bloating: no        Nausea/vomitting: no        Diarrhea: YES       Dysuria or Hematuria: no     History (previous similar pain/trauma/previous testing): no    Precipitating or alleviating factors:       Pain worse with eating/BM/urination: no change       Pain relieved by BM: no     Therapies tried and outcome: None    Bel states she was told after her last colonoscopy she had diverticulosis - without any complications at that time.    LMP:  not applicable            Problem list and histories reviewed & adjusted, as indicated.  Additional history: as documented    Patient Active Problem List   Diagnosis     Advance care planning     Mixed hyperlipidemia     Postmenopausal bleeding     Type 2 diabetes mellitus without complication, without long-term current use of insulin (H)     Benign essential hypertension     Chronic right-sided low back pain with right-sided sciatica     Combined forms of age-related cataract     Past Surgical History:   Procedure Laterality Date     COLONOSCOPY N/A 8/21/2015    no further scopes needed.  Surgeon: Yohan Licona DO;  Location: [facility]     DILATION AND CURETTAGE, OPERATIVE HYSTEROSCOPY, COMBINED N/A 1/13/2016    Procedure: COMBINED DILATION AND CURETTAGE, OPERATIVE HYSTEROSCOPY;  Surgeon: Seven Kern MD;  Location: [facility]     left shoulder  1990    nerve damage  s/p trauma; from repetitious work     lumpectomy left breast  1990    benign -- fibroadenoma-left; Facility; [facility]     PHACOEMULSIFICATION WITH STANDARD INTRAOCULAR  LENS IMPLANT Left 11/11/2014    Procedure: PHACOEMULSIFICATION WITH STANDARD INTRAOCULAR LENS IMPLANT;  Surgeon: Bobby Arambula MD;  Location: [facility]       Social History   Substance Use Topics     Smoking status: Former Smoker     Years: 14.00     Types: Cigarettes     Smokeless tobacco: Never Used      Comment: 20.00 per day; quit 1991     Alcohol use 0.0 oz/week     0 Standard drinks or equivalent per week      Comment: 1 beer weekly     Family History   Problem Relation Age of Onset     C.A.D. Father      Hypertension Father      Other - See Comments Father 78     hyperlipidemia/MI; cause of death     C.A.D. Mother      s/p AMI, pacemaker     DIABETES Mother 95     Hypertension Mother      Other - See Comments Mother      hyperlipidemia/hypothyroid     CANCER Brother 72     lip; cause of death     CANCER Sister 65     lung     Coronary Artery Disease Brother      gallbladder     Other - See Comments Brother 50     AMI x2     Other - See Comments Sister      hyperlipidemia     Hypertension Sister          Current Outpatient Prescriptions   Medication Sig Dispense Refill     allopurinol (ZYLOPRIM) 100 MG tablet TAKE ONE & ONE-HALF TABLETS BY MOUTH DAILY 135 tablet 1     valsartan (DIOVAN) 80 MG tablet TAKE ONE TABLET TWO TIMES A DAY BY MOUTH - GENERIC FOR DIOVAN 180 tablet 1     ONE TOUCH ULTRA test strip TEST BLOOD SURGARS ONCE A  each 3     metFORMIN (GLUCOPHAGE) 500 MG tablet TAKE 1 TABLET WITH SUPPER FOR THE FIRST WEEK, AFTER THE FIRST WEEK TAKE 1 TABLET WITH BREAKFAST AND 1 TABLET WITH SUPPER. 60 tablet 3     Omega-3 Fatty Acids (FISH OIL) 1000 MG CPDR Take 1 capsule by mouth daily       clobetasol (TEMOVATE) 0.05 % ointment Apply sparingly to affected area twice daily as needed.  Do not apply to face. 45 g 3     blood glucose monitoring (ONE TOUCH ULTRA MINI) meter device kit 1 kit by In Vitro route daily Use to test blood sugar 1 times daily or as directed. 1 kit 0     levothyroxine  "(SYNTHROID/LEVOTHROID) 25 MCG tablet TAKE 1 TABLET DAILY BY DENVER QUINN - GENERIC FOR SYNTHROID 90 tablet 3     blood glucose monitoring (ONE TOUCH DELICA) lancets Use to test blood sugar 1 times daily or as directed. 1 Box 12     Allergies   Allergen Reactions     Aspirin      Tightness in chest       Erythromycin Base [Kdc:Yellow Dye+Erythromycin+Brilliant Blue Fcf]      Ezetimibe Other (See Comments)     Aches; Zetia       Fenofibrate Micronized [Fenofibrate]      Aches; Tricor     Levofloxacin      Flu-like symptoms.     Lisinopril Other (See Comments)     headache     No Clinical Screening - See Comments      Fenofibrate nanocrystallized; Aches- Tricor     Pravastatin Sodium Other (See Comments)     Myalgia; Pravachol     Dexamethasone Rash     Maxidex     Dexamethasone Sod Phosphate [Dexamethasone Sodium Phosphate] Rash     Maxidex     Niacin Rash     Rosuvastatin Calcium Rash     crestor           Reviewed and updated as needed this visit by clinical staffTobacco  Allergies  Meds  Med Hx  Surg Hx  Fam Hx  Soc Hx      Reviewed and updated as needed this visit by Provider         ROS:  C: NEGATIVE for fever, chills, change in weight  INTEGUMENTARY/SKIN: hives due to increased stress - clearing up now  E/M: NEGATIVE for ear, mouth and throat problems  R: NEGATIVE for significant cough or SOB  CV: NEGATIVE for chest pain, palpitations or peripheral edema  GI: NEGATIVE for nausea, abdominal pain, heartburn, or change in bowel habits  : denies dysuria     OBJECTIVE:     /80  Pulse 67  Temp 98.1  F (36.7  C) (Tympanic)  Resp 16  Ht 5' 1\" (1.549 m)  Wt 180 lb (81.6 kg)  SpO2 97%  BMI 34.01 kg/m2  Body mass index is 34.01 kg/(m^2).   GENERAL: healthy, alert and no distress  RESP: lungs clear to auscultation - no rales, rhonchi or wheezes  CV: regular rate and rhythm, normal S1 S2, no S3 or S4, no murmur, click or rub, no peripheral edema and peripheral pulses strong  ABDOMEN: soft, nontender, no " hepatosplenomegaly, no masses and bowel sounds normal  PSYCH: mentation appears normal, affect normal/bright    Diagnostic Test Results:  none     ASSESSMENT:       PLAN:   ASSESSMENT / PLAN:  (K57.30) Diverticulosis of large intestine without hemorrhage  (primary encounter diagnosis)  Comment: abdominal pain cleared  Plan:  Provided information for diverticulitis and high fiber diet        Follow up with any concerns        JOSEPH Roa [facility]

## 2017-07-25 NOTE — NURSING NOTE
"Chief Complaint   Patient presents with     GI Problem     dx diverticulitis       Initial /80  Pulse 67  Temp 98.1  F (36.7  C) (Tympanic)  Resp 16  Ht 5' 1\" (1.549 m)  Wt 180 lb (81.6 kg)  SpO2 97%  BMI 34.01 kg/m2 Estimated body mass index is 34.01 kg/(m^2) as calculated from the following:    Height as of this encounter: 5' 1\" (1.549 m).    Weight as of this encounter: 180 lb (81.6 kg).  Medication Reconciliation: complete   Tatiana Landers      "

## 2017-07-25 NOTE — MR AVS SNAPSHOT
After Visit Summary   7/25/2017    Bel Reardon    MRN: [id number]           Patient Information     Date Of Birth          1940        Visit Information        Provider Department      7/25/2017 1:30 PM Ashley Childs APRN [facility]        Today's Diagnoses     Diverticulosis of large intestine without hemorrhage    -  1      Care Instructions      Diverticulitis    Some people get pouches along the wall of the colon as they get older. The pouches, called diverticuli, usually cause no symptoms. If the pouches become blocked, you can get an infection. This infection is called diverticulitis. It causes pain in your lower abdomen and fever. If not treated, it can become a serious condition, causing an abscess to form inside the pouch. The abscess may block the intestinal tract even or rupture, spreading infection throughout the abdomen.  When treatment is started early, oral antibiotics alone may be enough to cure diverticulitis. This method is tried first. But, if you don't improve or if your condition gets worse while using oral antibiotics, you may need to be admitted to the hospital for IV antibiotics. Severe cases may require surgery.  Home care  The following guidelines will help you care for yourself at home:    During the acute illness, rest and follow your healthcare provider's instructions about diet. Sometimes you will need to follow a clear liquid diet to rest your bowel. Once your symptoms are better, you may be told to follow a low-fiber diet for some time. Include foods like:    Flake cereal, mashed potatoes, pancakes, waffles, pasta, white bread, rice, applesauce, bananas, eggs, fish, poultry, tofu, and cooked soft vegetables    Take antibiotics exactly as instructed. Don't miss any doses or stop taking the medication, even if you feel better.    Monitor your temperature and tell your healthcare provider if you have rising temperatures.  Preventing  future attacks  Once you have an episode of diverticulitis, you are at risk for having it again. After you have recovered from this episode, you may be able to lower your risk by eating a high-fiber diet (20 gm/day to 35 gm/day of fiber). This cleans out the colon pouches that already exist and may prevent new ones from forming. Foods high in fiber include fresh fruits and edible peelings, raw or lightly cooked vegetables, whole grain cereals and breads, dried beans and peas, and bran.  Other steps that can help prevent future attacks include:    Take your medicines, such as antibiotics, as your healthcare provider says.    Drink 6 to 8 glasses of water every day, unless told otherwise.    Use a heating pad or hot water bottle to help abdominal cramping or pain.    Begin an exercise program. Ask your healthcare provider how to get started. You can benefit from simple activities such as walking or gardening.    Treat diarrhea with a bland diet. Start with liquids only; then slowly add fiber over time.    Watch for changes in your bowel movements (constipation to diarrhea). Avoid constipation by eating a high fiber diet and taking a stool softener if needed.    Get plenty of rest and sleep.  Follow-up care  Follow up with your healthcare provider as advised or sooner if you are not getting better in the next 2 days.  When to seek medical advice  Call your healthcare provider right away if any of these occur:    Fever of 100.4 F (38 C) or higher, or as directed by your healthcare provider    Repeated vomiting or swelling of the abdomen    Weakness, dizziness, light-headedness    Pain in your abdomen that gets worse, severe, or spreads to your back    Pain that moves to the right lower abdomen    Rectal bleeding (stools that are red, black or maroon color)    Unexpected vaginal bleeding  Date Last Reviewed: 9/1/2016 2000-2017 [employer]. [address] [place] [address], [place] [address]. All rights reserved.  This information is not intended as a substitute for professional medical care. Always follow your healthcare professional's instructions.      Eating a High-Fiber Diet  Fiber is what gives strength and structure to plants. Most grains, beans, vegetables, and fruits contain fiber. Foods rich in fiber are often low in calories and fat, and they fill you up more. They may also reduce your risks for certain health problems. To find out the amount of fiber in canned, packaged, or frozen foods, read the Nutrition Facts label. It tells you how much fiber is in a serving.    Types of fiber and their benefits  There are two types of fiber: insoluble and soluble. They both aid digestion and help you maintain a healthy weight.    Insoluble fiber. This is found in whole grains, cereals, certain fruits and vegetables such as apple skin, corn, and carrots. Insoluble fiber may prevent constipation and reduce the risk for certain types of cancer.    Soluble fiber. This type of fiber is in oats, beans, and certain fruits and vegetables such as strawberries and peas. Soluble fiber can reduce cholesterol, which may help lower the risk for heart disease. It also helps control blood sugar levels.  Look for high-fiber foods  Try these foods to add fiber to your diet:    Whole-grain breads and cereals. Try to eat 6 to 8 ounces a day. Include wheat and oat bran cereals, whole-wheat muffins or toast, and corn tortillas in your meals.    Fruits. Try to eat 2 cups a day. Apples, oranges, strawberries, pears, and bananas are good sources. (Note: Fruit juice is low in fiber.)    Vegetables. Try to eat at least 2.5 cups a day. Add asparagus, carrots, broccoli, peas, and corn to your meals.    Beans. One cup of cooked lentils gives you over 15 grams of fiber. Try navy beans, lentils, and chickpeas.    Seeds. A small handful of seeds gives you about 3 grams of fiber. Try sunflower seeds.  Keep track of your fiber  Keep track of how much fiber you  eat. Start by reading food labels. Then eat a variety of foods high in fiber. As you begin to eat more fiber, ask your healthcare provider how much water you should be drinking to keep your digestive system working smoothly.  You should aim for a certain amount of fiber in your diet each day. If you are a woman, that amount is between 25 and 28 grams per day. Men should aim for 30 to 33 grams per day. After age 50, your daily fiber needs drop to 22 grams for women and 28 grams for men.  Before you reach for the fiber supplements, think about this. Fiber is found naturally in healthy whole foods. It gives you that feeling of fullness after you eat. Taking fiber supplements or eating fiber-enriched foods will not give you this full feeling.  Your fiber intake is a good measure for the quality of your overall diet. If you are missing out on your daily amount of fiber, you may be lacking other important nutrients as well.  Date Last Reviewed: 5/11/2015 2000-2017 [employer]. [address] [place] [address]. All rights reserved. This information is not intended as a substitute for professional medical care. Always follow your healthcare professional's instructions.                Follow-ups after your visit        Follow-up notes from your care team     Return if symptoms worsen or fail to improve.      Your next 10 appointments already scheduled     Sep 21, 2017  1:30 PM CDT   (Arrive by 1:15 PM)   SHORT with Seven Kern MD   [facility] ([facility] )    [address] [place] [address]   [phone]            Nov 01, 2017  8:30 AM CDT   LAB with  LAB   [facility] [place] ([facility] [place] [place] )    [address] [place] [address]   [phone]            Nov 01, 2017  8:45 AM CDT   (Arrive by 8:30 AM)   SHORT with Jennie Rubio MD   [facility]    [address] [place]  "[place] [address]   [phone]              Who to contact     If you have questions or need follow up information about today's clinic visit or your schedule please contact [facility] directly at [phone].  Normal or non-critical lab and imaging results will be communicated to you by MyChart, letter or phone within 4 business days after the clinic has received the results. If you do not hear from us within 7 days, please contact the clinic through MyChart or phone. If you have a critical or abnormal lab result, we will notify you by phone as soon as possible.  Submit refill requests through [employer] or call your pharmacy and they will forward the refill request to us. Please allow 3 business days for your refill to be completed.          Additional Information About Your Visit        Care EveryWhere ID     This is your Care EveryWhere ID. This could be used by other organizations to access your [place] medical records  [id number]        Your Vitals Were     Pulse Temperature Respirations Height Pulse Oximetry BMI (Body Mass Index)    67 98.1  F (36.7  C) (Tympanic) 16 5' 1\" (1.549 m) 97% 34.01 kg/m2       Blood Pressure from Last 3 Encounters:   07/25/17 120/80   06/28/17 138/84   03/20/17 144/82    Weight from Last 3 Encounters:   07/25/17 180 lb (81.6 kg)   06/28/17 170 lb (77.1 kg)   03/20/17 175 lb (79.4 kg)              Today, you had the following     No orders found for display       Primary Care Provider Office Phone # Fax #    Jennie LILIAN Rubio [phone]       [facility] [address]  [facility] [address]        Equal Access to Services     [facility] : Hadii karin hammond Sojosé, waaxda luqadaha, qaybta kaalmagemini maharaj. So [facility] [phone].    ATENCIÓN: Si habla español, tiene a guaman disposición servicios gratuitos de asistencia lingüística. Llame al [phone].    We comply with applicable federal civil " rights laws and [place] laws. We do not discriminate on the basis of race, color, national origin, age, disability sex, sexual orientation or gender identity.            Thank you!     Thank you for choosing [facility]  for your care. Our goal is always to provide you with excellent care. Hearing back from our patients is one way we can continue to improve our services. Please take a few minutes to complete the written survey that you may receive in the mail after your visit with us. Thank you!             Your Updated Medication List - Protect others around you: Learn how to safely use, store and throw away your medicines at www.disposemymeds.org.          This list is accurate as of: 7/25/17  1:53 PM.  Always use your most recent med list.                   Brand Name Dispense Instructions for use Diagnosis    allopurinol 100 MG tablet    ZYLOPRIM    135 tablet    TAKE ONE & ONE-HALF TABLETS BY MOUTH DAILY    Idiopathic gout, unspecified chronicity, unspecified site       blood glucose monitoring lancets     1 Box    Use to test blood sugar 1 times daily or as directed.    Type 2 diabetes mellitus without complication (H)       blood glucose monitoring meter device kit     1 kit    1 kit by In Vitro route daily Use to test blood sugar 1 times daily or as directed.    Type 2 diabetes mellitus without complication, without long-term current use of insulin (H)       clobetasol 0.05 % ointment    TEMOVATE    45 g    Apply sparingly to affected area twice daily as needed.  Do not apply to face.    Lichen sclerosus et atrophicus of the vulva       Fish Oil 1000 MG Cpdr      Take 1 capsule by mouth daily        levothyroxine 25 MCG tablet    SYNTHROID/LEVOTHROID    90 tablet    TAKE 1 TABLET DAILY BY MOUT H - GENERIC FOR SYNTHROID    Other specified hypothyroidism       metFORMIN 500 MG tablet    GLUCOPHAGE    60 tablet    TAKE 1 TABLET WITH SUPPER FOR THE FIRST WEEK, AFTER THE FIRST WEEK TAKE 1 TABLET  WITH BREAKFAST AND 1 TABLET WITH SUPPER.    Type 2 diabetes mellitus without complication, without long-term current use of insulin (H)       ONE TOUCH ULTRA test strip   Generic drug:  blood glucose monitoring     100 each    TEST BLOOD SURGARS ONCE A DAY    Type 2 diabetes mellitus without complication (H)       valsartan 80 MG tablet    DIOVAN    180 tablet    TAKE ONE TABLET TWO TIMES A DAY BY MOUTH - GENERIC FOR DIOVAN    Benign essential hypertension

## 2017-07-26 ASSESSMENT — PATIENT HEALTH QUESTIONNAIRE - PHQ9: SUM OF ALL RESPONSES TO PHQ QUESTIONS 1-9: 0

## 2017-07-26 ASSESSMENT — ANXIETY QUESTIONNAIRES: GAD7 TOTAL SCORE: 0

## 2017-08-09 ENCOUNTER — TELEPHONE (OUTPATIENT)
Dept: FAMILY MEDICINE | Facility: OTHER | Age: 77
End: 2017-08-09

## 2017-08-09 ENCOUNTER — OFFICE VISIT (OUTPATIENT)
Dept: FAMILY MEDICINE | Facility: OTHER | Age: 77
End: 2017-08-09
Attending: FAMILY MEDICINE
Payer: COMMERCIAL

## 2017-08-09 VITALS
HEIGHT: 61 IN | DIASTOLIC BLOOD PRESSURE: 80 MMHG | BODY MASS INDEX: 33.99 KG/M2 | RESPIRATION RATE: 20 BRPM | HEART RATE: 68 BPM | OXYGEN SATURATION: 97 % | TEMPERATURE: 97.8 F | WEIGHT: 180 LBS | SYSTOLIC BLOOD PRESSURE: 134 MMHG

## 2017-08-09 DIAGNOSIS — F43.22 ADJUSTMENT DISORDER WITH ANXIOUS MOOD: ICD-10-CM

## 2017-08-09 DIAGNOSIS — L50.9 URTICARIA: Primary | ICD-10-CM

## 2017-08-09 PROCEDURE — 99212 OFFICE O/P EST SF 10 MIN: CPT

## 2017-08-09 PROCEDURE — 99213 OFFICE O/P EST LOW 20 MIN: CPT | Performed by: FAMILY MEDICINE

## 2017-08-09 RX ORDER — LORAZEPAM 1 MG/1
0.5-1 TABLET ORAL EVERY 8 HOURS PRN
Qty: 20 TABLET | Refills: 0 | Status: SHIPPED | OUTPATIENT
Start: 2017-08-09 | End: 2017-11-01

## 2017-08-09 RX ORDER — CETIRIZINE HYDROCHLORIDE 10 MG/1
10 TABLET ORAL EVERY EVENING
Qty: 30 TABLET | Refills: 1 | Status: SHIPPED | OUTPATIENT
Start: 2017-08-09 | End: 2017-08-30

## 2017-08-09 ASSESSMENT — PAIN SCALES - GENERAL: PAINLEVEL: NO PAIN (0)

## 2017-08-09 NOTE — TELEPHONE ENCOUNTER
Pt was notified that Dr Rubio is out today and does not have an appt available this week, pt was transferred to scheduling to see if another provider has openings.

## 2017-08-09 NOTE — MR AVS SNAPSHOT
After Visit Summary   8/9/2017    Bel Reardon    MRN: [id number]           Patient Information     Date Of Birth          1940        Visit Information        Provider Department      8/9/2017 11:20 AM Jeremy Win MD [facility]        Today's Diagnoses     Urticaria    -  1    Adjustment disorder with anxious mood          Care Instructions    Take cetirizine once daily as needed for hives          Follow-ups after your visit        Follow-up notes from your care team     Return if symptoms worsen or fail to improve.      Your next 10 appointments already scheduled     Sep 21, 2017  1:30 PM CDT   (Arrive by 1:15 PM)   SHORT with Seven Kern MD   [facility] ([facility] [place] [facility] [place] )    [address]  [place] [address]   [phone]            Nov 01, 2017  8:30 AM CDT   LAB with [facility]   [facility] ([facility] [place] [facility] [place] )    [address]  [place] [address]   [phone]            Nov 01, 2017  8:45 AM CDT   (Arrive by 8:30 AM)   SHORT with Jennie Rubio MD   [facility] ([facility] [place] [facility] [place] )    [address]  [place] [address]   [phone]              Who to contact     If you have questions or need follow up information about today's clinic visit or your schedule please contact [facility] directly at [phone].  Normal or non-critical lab and imaging results will be communicated to you by MyChart, letter or phone within 4 business days after the clinic has received the results. If you do not hear from us within 7 days, please contact the clinic through MyChart or phone. If you have a critical or abnormal lab result, we will notify you by phone as soon as possible.  Submit refill requests through [employer] or call your pharmacy and they will forward the refill request to us. Please allow 3 business days for your refill to be completed.          Additional  "Information About Your Visit        Care EveryWhere ID     This is your Care EveryWhere ID. This could be used by other organizations to access your [facility] medical records  [id number]        Your Vitals Were     Pulse Temperature Respirations Height Pulse Oximetry Breastfeeding?    68 97.8  F (36.6  C) (Tympanic) 20 5' 1\" (1.549 m) 97% No    BMI (Body Mass Index)                   34.01 kg/m2            Blood Pressure from Last 3 Encounters:   08/09/17 134/80   07/25/17 120/80   06/28/17 138/84    Weight from Last 3 Encounters:   08/09/17 180 lb (81.6 kg)   07/25/17 180 lb (81.6 kg)   06/28/17 170 lb (77.1 kg)              Today, you had the following     No orders found for display         Today's Medication Changes          These changes are accurate as of: 8/9/17 11:59 PM.  If you have any questions, ask your nurse or doctor.               Start taking these medicines.        Dose/Directions    cetirizine 10 MG tablet   Commonly known as:  zyrTEC   Used for:  Urticaria   Started by:  Jeremy Win MD        Dose:  10 mg   Take 1 tablet (10 mg) by mouth every evening   Quantity:  30 tablet   Refills:  1       LORazepam 1 MG tablet   Commonly known as:  ATIVAN   Used for:  Adjustment disorder with anxious mood   Started by:  Jeremy Win MD        Dose:  0.5-1 mg   Take 0.5-1 tablets (0.5-1 mg) by mouth every 8 hours as needed for anxiety Take 30 minutes prior to departure.  Do not operate a vehicle after taking this medication   Quantity:  20 tablet   Refills:  0            Where to get your medicines      These medications were sent to  [id number] - [address]  [address] [place] [address]     Phone:  [phone]     cetirizine 10 MG tablet         Some of these will need a paper prescription and others can be bought over the counter.  Ask your nurse if you have questions.     Bring a paper prescription for each of these medications     LORazepam 1 MG tablet "                Primary Care Provider Office Phone # Fax #    Jennie Rubio [phone]       [facility] [place] [address]  [place] [address]        Equal Access to Services     SANDOR ESTRELLA : Hadii karin ku hadaubreyo Soomaali, waaxda luqadaha, qaybta kaalmada adeegyada, gemini saban brittany funez laPadminiasher leyva. So [facility] [phone].    ATENCIÓN: Si habla español, tiene a guaman disposición servicios gratuitos de asistencia lingüística. Llame al [phone].    We comply with applicable federal civil rights laws and [place] laws. We do not discriminate on the basis of race, color, national origin, age, disability sex, sexual orientation or gender identity.            Thank you!     Thank you for choosing [facility]  for your care. Our goal is always to provide you with excellent care. Hearing back from our patients is one way we can continue to improve our services. Please take a few minutes to complete the written survey that you may receive in the mail after your visit with us. Thank you!             Your Updated Medication List - Protect others around you: Learn how to safely use, store and throw away your medicines at www.disposemymeds.org.          This list is accurate as of: 8/9/17 11:59 PM.  Always use your most recent med list.                   Brand Name Dispense Instructions for use Diagnosis    allopurinol 100 MG tablet    ZYLOPRIM    135 tablet    TAKE ONE & ONE-HALF TABLETS BY MOUTH DAILY    Idiopathic gout, unspecified chronicity, unspecified site       blood glucose monitoring lancets     1 Box    Use to test blood sugar 1 times daily or as directed.    Type 2 diabetes mellitus without complication (H)       blood glucose monitoring meter device kit     1 kit    1 kit by In Vitro route daily Use to test blood sugar 1 times daily or as directed.    Type 2 diabetes mellitus without complication, without long-term current use of insulin (H)       cetirizine 10  MG tablet    zyrTEC    30 tablet    Take 1 tablet (10 mg) by mouth every evening    Urticaria       clobetasol 0.05 % ointment    TEMOVATE    45 g    Apply sparingly to affected area twice daily as needed.  Do not apply to face.    Lichen sclerosus et atrophicus of the vulva       Fish Oil 1000 MG Cpdr      Take 1 capsule by mouth daily        levothyroxine 25 MCG tablet    SYNTHROID/LEVOTHROID    90 tablet    TAKE 1 TABLET DAILY BY MOUT H - GENERIC FOR SYNTHROID    Other specified hypothyroidism       LORazepam 1 MG tablet    ATIVAN    20 tablet    Take 0.5-1 tablets (0.5-1 mg) by mouth every 8 hours as needed for anxiety Take 30 minutes prior to departure.  Do not operate a vehicle after taking this medication    Adjustment disorder with anxious mood       metFORMIN 500 MG tablet    GLUCOPHAGE    60 tablet    TAKE 1 TABLET WITH SUPPER FOR THE FIRST WEEK, AFTER THE FIRST WEEK TAKE 1 TABLET WITH BREAKFAST AND 1 TABLET WITH SUPPER.    Type 2 diabetes mellitus without complication, without long-term current use of insulin (H)       ONE TOUCH ULTRA test strip   Generic drug:  blood glucose monitoring     100 each    TEST BLOOD SURGARS ONCE A DAY    Type 2 diabetes mellitus without complication (H)       valsartan 80 MG tablet    DIOVAN    180 tablet    TAKE ONE TABLET TWO TIMES A DAY BY MOUTH - GENERIC FOR DIOVAN    Benign essential hypertension

## 2017-08-09 NOTE — NURSING NOTE
"Chief Complaint   Patient presents with     Hives     broke out in hives due to stress.        Initial /80  Pulse 68  Temp 97.8  F (36.6  C) (Tympanic)  Resp 20  Ht 5' 1\" (1.549 m)  Wt 180 lb (81.6 kg)  SpO2 97%  Breastfeeding? No  BMI 34.01 kg/m2 Estimated body mass index is 34.01 kg/(m^2) as calculated from the following:    Height as of this encounter: 5' 1\" (1.549 m).    Weight as of this encounter: 180 lb (81.6 kg).  Medication Reconciliation: complete   Palma Reed      "

## 2017-08-09 NOTE — PROGRESS NOTES
SUBJECTIVE:  Bel Reardon, 77 year old, female is seen with hive and diarrhea.   The pruritis is diffuse and she has noted urticarial lesions on her neck. These tend to be worse with anxiety. She notes significant psychosocial stressors.    Denies fever, shaking, chills, dysphagia, change in appetite, weight loss, nausea, vomiting, diarrhea, melena, or hematochezia.  No change in bowel habits.      Current Outpatient Prescriptions   Medication Sig Dispense Refill     allopurinol (ZYLOPRIM) 100 MG tablet TAKE ONE & ONE-HALF TABLETS BY MOUTH DAILY 135 tablet 1     valsartan (DIOVAN) 80 MG tablet TAKE ONE TABLET TWO TIMES A DAY BY MOUTH - GENERIC FOR DIOVAN 180 tablet 1     ONE TOUCH ULTRA test strip TEST BLOOD SURGARS ONCE A  each 3     metFORMIN (GLUCOPHAGE) 500 MG tablet TAKE 1 TABLET WITH SUPPER FOR THE FIRST WEEK, AFTER THE FIRST WEEK TAKE 1 TABLET WITH BREAKFAST AND 1 TABLET WITH SUPPER. 60 tablet 3     Omega-3 Fatty Acids (FISH OIL) 1000 MG CPDR Take 1 capsule by mouth daily       clobetasol (TEMOVATE) 0.05 % ointment Apply sparingly to affected area twice daily as needed.  Do not apply to face. 45 g 3     blood glucose monitoring (ONE TOUCH ULTRA MINI) meter device kit 1 kit by In Vitro route daily Use to test blood sugar 1 times daily or as directed. 1 kit 0     levothyroxine (SYNTHROID/LEVOTHROID) 25 MCG tablet TAKE 1 TABLET DAILY BY MOUT H - GENERIC FOR SYNTHROID 90 tablet 3     blood glucose monitoring (ONE TOUCH DELICA) lancets Use to test blood sugar 1 times daily or as directed. 1 Box 12        Allergies   Allergen Reactions     Aspirin      Tightness in chest       Erythromycin Base [Kdc:Yellow Dye+Erythromycin+Brilliant Blue Fcf]      Ezetimibe Other (See Comments)     Aches; Zetia       Fenofibrate Micronized [Fenofibrate]      Aches; Tricor     Levofloxacin      Flu-like symptoms.     Lisinopril Other (See Comments)     headache     No Clinical Screening - See Comments      Fenofibrate  nanocrystallized; Aches- Tricor     Pravastatin Sodium Other (See Comments)     Myalgia; Pravachol     Dexamethasone Rash     Maxidex     Dexamethasone Sod Phosphate [Dexamethasone Sodium Phosphate] Rash     Maxidex     Niacin Rash     Rosuvastatin Calcium Rash     crestor         Past Medical History:   Diagnosis Date     Agoraphobia with panic disorder 03/24/2011     Anxiety 03/24/2011     DM2 (diabetes mellitus, type 2) (H)      Epistaxis      Fibrocystic Breast Disease 03/24/2011     GERD 03/24/2011    Hx H. Pylori     Gout, unspecified 01/04/2011     Hypertension, Benign 03/24/2011     Lichen sclerosus     declines medication     Normal cardiac stress test 02/28/2013 Feb 2013 wnl     Pure hypercholesterolemia 01/13/2009     Past Surgical History:   Procedure Laterality Date     COLONOSCOPY N/A 8/21/2015    no further scopes needed.  Surgeon: Yohan Licona DO;  Location: [facility]     DILATION AND CURETTAGE, OPERATIVE HYSTEROSCOPY, COMBINED N/A 1/13/2016    Procedure: COMBINED DILATION AND CURETTAGE, OPERATIVE HYSTEROSCOPY;  Surgeon: Seven Kern MD;  Location: [facility]     left shoulder  1990    nerve damage  s/p trauma; from repetitious work     lumpectomy left breast  1990    benign -- fibroadenoma-left; Facility; [facility]     PHACOEMULSIFICATION WITH STANDARD INTRAOCULAR LENS IMPLANT Left 11/11/2014    Procedure: PHACOEMULSIFICATION WITH STANDARD INTRAOCULAR LENS IMPLANT;  Surgeon: Bobby Arambula MD;  Location: [facility]     Family History   Problem Relation Age of Onset     C.A.D. Father      Hypertension Father      Other - See Comments Father 78     hyperlipidemia/MI; cause of death     C.A.D. Mother      s/p AMI, pacemaker     DIABETES Mother 95     Hypertension Mother      Other - See Comments Mother      hyperlipidemia/hypothyroid     CANCER Brother 72     lip; cause of death     CANCER Sister 65     lung     Coronary Artery Disease Brother      gallbladder     Other - See Comments Brother 50     AMI x2  "    Other - See Comments Sister      hyperlipidemia     Hypertension Sister      Social History     Social History     Marital status:      Spouse name: N/A     Number of children: 2     Years of education: 13     Occupational History     [employer] Retired     management     Social History Main Topics     Smoking status: Former Smoker     Years: 14.00     Types: Cigarettes     Smokeless tobacco: Never Used      Comment: 20.00 per day; quit 1991     Alcohol use 0.0 oz/week     0 Standard drinks or equivalent per week      Comment: 1 beer weekly     Drug use: No     Sexual activity: Not Currently     Partners: Male     Other Topics Concern      Service No     Blood Transfusions Yes     Caffeine Concern Yes     1 cup decaf coffee daily     Exercise Yes     walking, yard work 2-3 times/ week     Seat Belt Yes     Self-Exams Yes     Parent/Sibling W/ Cabg, Mi Or Angioplasty Before 65f 55m? No     Social History Narrative         Review Of Systems  Constitutional, HEENT, cardiovascular, pulmonary, gi and gu systems are negative, except as otherwise noted.      OBJECTIVE:/80  Pulse 68  Temp 97.8  F (36.6  C) (Tympanic)  Resp 20  Ht 5' 1\" (1.549 m)  Wt 180 lb (81.6 kg)  SpO2 97%  Breastfeeding? No  BMI 34.01 kg/m2      Exam:  Physical Exam   Constitutional: She is oriented to person, place, and time. She appears well-developed and well-nourished. No distress.   Neurological: She is alert and oriented to person, place, and time.   Skin: Skin is warm and dry.   There are urticarial lesions noted upper neck region   Psychiatric: She has a normal mood and affect.     Other exam not repeated    Labs:  Orders Only on 06/28/2017   Component Date Value Ref Range Status     Hemoglobin A1C 06/28/2017 6.7* 4.3 - 6.0 % Final     Creatinine Urine 06/28/2017 141  mg/dL Final     Albumin Urine mg/L 06/28/2017 22  mg/L Final     Albumin Urine mg/g Cr 06/28/2017 15.46  0 - 25 mg/g Cr Final     " Estimated Average Glucose 06/28/2017 146  mg/dL Final       ASSESSMENT/PLAN:  Urticaria  Discussed natural history.  Cetirizine daily.  Follow up with Primary Care Provider with persistent lesions.  - cetirizine (ZYRTEC) 10 MG tablet; Take 1 tablet (10 mg) by mouth every evening    Adjustment disorder with anxious mood  Cautious lorazepam as needed.   - LORazepam (ATIVAN) 1 MG tablet; Take 0.5-1 tablets (0.5-1 mg) by mouth every 8 hours as needed for anxiety Take 30 minutes prior to departure.  Do not operate a vehicle after taking this medication            Jeremy Win MD

## 2017-08-09 NOTE — TELEPHONE ENCOUNTER
8:32 AM    Reason for Call: OVERBOOK    Patient is having the following symptoms: stress hives/loose stools for 2 weeks.    The patient is requesting an appointment for sooner than first available with [facility].    Was an appointment offered for this call? Yes    Preferred method for responding to this message: Telephone Call    If we cannot reach you directly, may we leave a detailed response at the number you provided? Yes    Can this message wait until your PCP/provider returns, if unavailable today? NO, patient would like a return call today.  Thank you    Betty Richard

## 2017-08-20 ENCOUNTER — HOSPITAL ENCOUNTER (EMERGENCY)
Facility: HOSPITAL | Age: 77
Discharge: HOME | End: 2017-08-20
Attending: NURSE PRACTITIONER | Admitting: NURSE PRACTITIONER
Payer: MEDICARE

## 2017-08-20 VITALS
OXYGEN SATURATION: 95 % | HEART RATE: 99 BPM | TEMPERATURE: 98.6 F | RESPIRATION RATE: 20 BRPM | SYSTOLIC BLOOD PRESSURE: 192 MMHG | DIASTOLIC BLOOD PRESSURE: 107 MMHG

## 2017-08-20 DIAGNOSIS — F41.9 ANXIETY: ICD-10-CM

## 2017-08-20 DIAGNOSIS — J03.90 TONSILLITIS: ICD-10-CM

## 2017-08-20 DIAGNOSIS — I10 ESSENTIAL HYPERTENSION: ICD-10-CM

## 2017-08-20 DIAGNOSIS — J40 BRONCHITIS: ICD-10-CM

## 2017-08-20 LAB
ALBUMIN SERPL-MCNC: 3.7 G/DL (ref 3.4–5)
ALBUMIN UR-MCNC: NEGATIVE MG/DL
ALP SERPL-CCNC: 63 U/L (ref 40–150)
ALT SERPL W P-5'-P-CCNC: 69 U/L (ref 0–50)
ANION GAP SERPL CALCULATED.3IONS-SCNC: 9 MMOL/L (ref 3–14)
APPEARANCE UR: ABNORMAL
AST SERPL W P-5'-P-CCNC: 67 U/L (ref 0–45)
BACTERIA #/AREA URNS HPF: ABNORMAL /HPF
BASOPHILS # BLD AUTO: 0 10E9/L (ref 0–0.2)
BASOPHILS NFR BLD AUTO: 0.8 %
BILIRUB SERPL-MCNC: 0.4 MG/DL (ref 0.2–1.3)
BILIRUB UR QL STRIP: NEGATIVE
BUN SERPL-MCNC: 13 MG/DL (ref 7–30)
CALCIUM SERPL-MCNC: 9.1 MG/DL (ref 8.5–10.1)
CHLORIDE SERPL-SCNC: 107 MMOL/L (ref 94–109)
CO2 SERPL-SCNC: 24 MMOL/L (ref 20–32)
COLOR UR AUTO: ABNORMAL
CREAT SERPL-MCNC: 0.65 MG/DL (ref 0.52–1.04)
DEPRECATED S PYO AG THROAT QL EIA: NORMAL
DIFFERENTIAL METHOD BLD: NORMAL
EOSINOPHIL # BLD AUTO: 0.2 10E9/L (ref 0–0.7)
EOSINOPHIL NFR BLD AUTO: 3.1 %
ERYTHROCYTE [DISTWIDTH] IN BLOOD BY AUTOMATED COUNT: 12.9 % (ref 10–15)
GFR SERPL CREATININE-BSD FRML MDRD: 89 ML/MIN/1.7M2
GLUCOSE SERPL-MCNC: 174 MG/DL (ref 70–99)
GLUCOSE UR STRIP-MCNC: NEGATIVE MG/DL
HCT VFR BLD AUTO: 43.7 % (ref 35–47)
HGB BLD-MCNC: 15.3 G/DL (ref 11.7–15.7)
HGB UR QL STRIP: NEGATIVE
IMM GRANULOCYTES # BLD: 0 10E9/L (ref 0–0.4)
IMM GRANULOCYTES NFR BLD: 0.4 %
KETONES UR STRIP-MCNC: NEGATIVE MG/DL
LEUKOCYTE ESTERASE UR QL STRIP: ABNORMAL
LYMPHOCYTES # BLD AUTO: 2.1 10E9/L (ref 0.8–5.3)
LYMPHOCYTES NFR BLD AUTO: 40.1 %
MCH RBC QN AUTO: 30.9 PG (ref 26.5–33)
MCHC RBC AUTO-ENTMCNC: 35 G/DL (ref 31.5–36.5)
MCV RBC AUTO: 88 FL (ref 78–100)
MONOCYTES # BLD AUTO: 0.4 10E9/L (ref 0–1.3)
MONOCYTES NFR BLD AUTO: 7.8 %
MUCOUS THREADS #/AREA URNS LPF: PRESENT /LPF
NEUTROPHILS # BLD AUTO: 2.5 10E9/L (ref 1.6–8.3)
NEUTROPHILS NFR BLD AUTO: 47.8 %
NITRATE UR QL: NEGATIVE
NRBC # BLD AUTO: 0 10*3/UL
NRBC BLD AUTO-RTO: 0 /100
PH UR STRIP: 5 PH (ref 4.7–8)
PLATELET # BLD AUTO: 192 10E9/L (ref 150–450)
POTASSIUM SERPL-SCNC: 3.7 MMOL/L (ref 3.4–5.3)
PROT SERPL-MCNC: 7.6 G/DL (ref 6.8–8.8)
RBC # BLD AUTO: 4.95 10E12/L (ref 3.8–5.2)
RBC #/AREA URNS AUTO: 0 /HPF (ref 0–2)
SODIUM SERPL-SCNC: 140 MMOL/L (ref 133–144)
SOURCE: ABNORMAL
SP GR UR STRIP: 1 (ref 1–1.03)
SPECIMEN SOURCE: NORMAL
SQUAMOUS #/AREA URNS AUTO: 4 /HPF (ref 0–1)
TRANS CELLS #/AREA URNS HPF: 1 /HPF (ref 0–1)
UROBILINOGEN UR STRIP-MCNC: NORMAL MG/DL (ref 0–2)
WBC # BLD AUTO: 5.2 10E9/L (ref 4–11)
WBC #/AREA URNS AUTO: 2 /HPF (ref 0–2)

## 2017-08-20 PROCEDURE — 99214 OFFICE O/P EST MOD 30 MIN: CPT | Mod: 25

## 2017-08-20 PROCEDURE — 80053 COMPREHEN METABOLIC PANEL: CPT | Performed by: NURSE PRACTITIONER

## 2017-08-20 PROCEDURE — 36415 COLL VENOUS BLD VENIPUNCTURE: CPT | Performed by: NURSE PRACTITIONER

## 2017-08-20 PROCEDURE — 85025 COMPLETE CBC W/AUTO DIFF WBC: CPT | Performed by: NURSE PRACTITIONER

## 2017-08-20 PROCEDURE — 87081 CULTURE SCREEN ONLY: CPT | Performed by: FAMILY MEDICINE

## 2017-08-20 PROCEDURE — 81001 URINALYSIS AUTO W/SCOPE: CPT | Performed by: NURSE PRACTITIONER

## 2017-08-20 PROCEDURE — 87880 STREP A ASSAY W/OPTIC: CPT | Performed by: FAMILY MEDICINE

## 2017-08-20 PROCEDURE — 99214 OFFICE O/P EST MOD 30 MIN: CPT | Performed by: NURSE PRACTITIONER

## 2017-08-20 PROCEDURE — 71020 ZZHC CHEST TWO VIEWS, FRONT/LAT: CPT | Mod: TC

## 2017-08-20 RX ORDER — AZITHROMYCIN 250 MG/1
TABLET, FILM COATED ORAL
Qty: 6 TABLET | Refills: 0 | Status: SHIPPED | OUTPATIENT
Start: 2017-08-20 | End: 2017-08-25

## 2017-08-20 ASSESSMENT — ENCOUNTER SYMPTOMS
COUGH: 1
RHINORRHEA: 0
FATIGUE: 1
WHEEZING: 1
FEVER: 0
ABDOMINAL PAIN: 0
SORE THROAT: 1

## 2017-08-20 NOTE — ED AVS SNAPSHOT
[facility]    [address] [address]    Phone:  [phone]                                       Bel Reardon   MRN: [id number]    Department:  [facility]   Date of Visit:  8/20/2017           After Visit Summary Signature Page     I have received my discharge instructions, and my questions have been answered. I have discussed any challenges I see with this plan with the nurse or doctor.    ..........................................................................................................................................  Patient/Patient Representative Signature      ..........................................................................................................................................  Patient Representative Print Name and Relationship to Patient    ..................................................               ................................................  Date                                            Time    ..........................................................................................................................................  Reviewed by Signature/Title    ...................................................              ..............................................  Date                                                            Time

## 2017-08-20 NOTE — ED AVS SNAPSHOT
[facility]    [address]    \[facility]\""[place] [address]    Phone:  [phone]                                       Bel Reardon   MRN: [id number]    Department:  [facility]   Date of Visit:  8/20/2017           Patient Information     Date Of Birth          1940        Your diagnoses for this visit were:     Tonsillitis     Bronchitis        You were seen by Mela Walden NP.      Follow-up Information     Follow up with Jennie Rubio MD In 3 days.    Specialty:  Family Practice    Why:  for re-evaluation    Contact information:    [facility] [place]  [address]  [place] [address] [phone]          Follow up with [facility].    Specialty:  EMERGENCY MEDICINE    Why:  If symptoms worsen    Contact information:    [address] [place] [address] [phone]    Additional information:    From [place]: Take [address]. Turn left at [address]. Turn left at the first stoplight on [address] [place] [address]. At the first stop sign, take a right onto [address]. Take a left into the parking lot and continue through until you reach the North enterance of the building.       From [place]: Take [address]. Take the [address] ramp towards [place]. Turn left onto [address]. Take a slight right onto [address]. Turn left at the first stoplight on [address]. At the first stop sign, take a right onto [address]. Take a left into the parking lot and continue through until you reach the North enterance of the building.       From [place]: Take [address]. Take a right at [address]. At the first stop sign, take a right onto [address]. Take a left into the parking lot and continue through until you reach the North enterance of the building.         Discharge Instructions         Bronchitis, Antibiotic Treatment (Adult)    Bronchitis is an infection of the  air passages (bronchial tubes) in your lungs. It often occurs when you have a cold. This illness is contagious during the first few days and is spread through the air by coughing and sneezing, or by direct contact (touching the sick person and then touching your own eyes, nose, or mouth).  Symptoms of bronchitis include cough with mucus (phlegm) and low-grade fever. Bronchitis usually lasts 7 to 14 days. Mild cases can be treated with simple home remedies. More severe infection is treated with an antibiotic.  Home care  Follow these guidelines when caring for yourself at home:    If your symptoms are severe, rest at home for the first 2 to 3 days. When you go back to your usual activities, don't let yourself get too tired.    Do not smoke. Also avoid being exposed to secondhand smoke.    You may use over-the-counter medicines to control fever or pain, unless another medicine was prescribed. (Note: If you have chronic liver or kidney disease or have ever had a stomach ulcer or gastrointestinal bleeding, talk with your healthcare provider before using these medicines. Also talk to your provider if you are taking medicine to prevent blood clots.) Aspirin should never be given to anyone younger than 18 years of age who is ill with a viral infection or fever. It may cause severe liver or brain damage.    Your appetite may be poor, so a light diet is fine. Avoid dehydration by drinking 6 to 8 glasses of fluids per day (such as water, soft drinks, sports drinks, juices, tea, or soup). Extra fluids will help loosen secretions in the nose and lungs.    Over-the-counter cough, cold, and sore-throat medicines will not shorten the length of the illness, but they may be helpful to reduce symptoms. (Note: Do not use decongestants if you have high blood pressure.)    Finish all antibiotic medicine. Do this even if you are feeling better after only a few days.  Follow-up care  Follow up with your healthcare provider, or as advised.  If you had an X-ray or ECG (electrocardiogram), a specialist will review it. You will be notified of any new findings that may affect your care.  Note: If you are age 65 or older, or if you have a chronic lung disease or condition that affects your immune system, or you smoke, talk to your healthcare provider about having pneumococcal vaccinations and a yearly influenza vaccination (flu shot).  When to seek medical advice  Call your healthcare provider right away if any of these occur:    Fever of 100.4 F (38 C) or higher    Coughing up increased amounts of colored sputum    Weakness, drowsiness, headache, facial pain, ear pain, or a stiff neck  Call 911, or get immediate medical care  Contact emergency services right away if any of these occur.    Coughing up blood    Worsening weakness, drowsiness, headache, or stiff neck    Trouble breathing, wheezing, or pain with breathing  Date Last Reviewed: 9/13/2015 2000-2017 [employer]. [address] [place] [address]. All rights reserved. This information is not intended as a substitute for professional medical care. Always follow your healthcare professional's instructions.          Future Appointments        Provider Department Dept Phone Center    9/21/2017 1:30 PM Seven Kern MD [facility] [place] [phone] [place]    11/1/2017 8:30 AM HC LAB [facility] [place] [phone] [place]    11/1/2017 8:45 AM Jennie Rubio MD [facility] [place] [phone] [place]         Review of your medicines      START taking        Dose / Directions Last dose taken    azithromycin 250 MG tablet   Commonly known as:  ZITHROMAX Z-ISSA   Quantity:  6 tablet        Two tablets on the first day, then one tablet daily for the next 4 days   Refills:  0          Our records show that you are taking the medicines listed below. If these are incorrect, please call your family doctor or clinic.        Dose / Directions Last  dose taken    allopurinol 100 MG tablet   Commonly known as:  ZYLOPRIM   Quantity:  135 tablet        TAKE ONE & ONE-HALF TABLETS BY MOUTH DAILY   Refills:  1        blood glucose monitoring lancets   Quantity:  1 Box        Use to test blood sugar 1 times daily or as directed.   Refills:  12        blood glucose monitoring meter device kit   Dose:  1 kit   Quantity:  1 kit        1 kit by In Vitro route daily Use to test blood sugar 1 times daily or as directed.   Refills:  0        cetirizine 10 MG tablet   Commonly known as:  zyrTEC   Dose:  10 mg   Quantity:  30 tablet        Take 1 tablet (10 mg) by mouth every evening   Refills:  1        clobetasol 0.05 % ointment   Commonly known as:  TEMOVATE   Quantity:  45 g        Apply sparingly to affected area twice daily as needed.  Do not apply to face.   Refills:  3        Fish Oil 1000 MG Cpdr   Dose:  1 capsule        Take 1 capsule by mouth daily   Refills:  0        levothyroxine 25 MCG tablet   Commonly known as:  SYNTHROID/LEVOTHROID   Quantity:  90 tablet        TAKE 1 TABLET DAILY BY MOUT H - GENERIC FOR SYNTHROID   Refills:  3        LORazepam 1 MG tablet   Commonly known as:  ATIVAN   Dose:  0.5-1 mg   Quantity:  20 tablet        Take 0.5-1 tablets (0.5-1 mg) by mouth every 8 hours as needed for anxiety Take 30 minutes prior to departure.  Do not operate a vehicle after taking this medication   Refills:  0        metFORMIN 500 MG tablet   Commonly known as:  GLUCOPHAGE   Quantity:  60 tablet        TAKE 1 TABLET WITH SUPPER FOR THE FIRST WEEK, AFTER THE FIRST WEEK TAKE 1 TABLET WITH BREAKFAST AND 1 TABLET WITH SUPPER.   Refills:  3        ONE TOUCH ULTRA test strip   Quantity:  100 each   Generic drug:  blood glucose monitoring        TEST BLOOD SURGARS ONCE A DAY   Refills:  3        valsartan 80 MG tablet   Commonly known as:  DIOVAN   Quantity:  180 tablet        TAKE ONE TABLET TWO TIMES A DAY BY MOUTH - GENERIC FOR DIOVAN   Refills:  1                 Prescriptions were sent or printed at these locations (1 Prescription)                   [id number] - [place], [place] - [address]   [address] [place] [address]    Telephone:  [phone]   Fax:  [phone]   Hours:                  E-Prescribed (1 of 1)         azithromycin (ZITHROMAX Z-ISSA) 250 MG tablet                Procedures and tests performed during your visit     Beta strep group A culture    CBC with platelets differential    Comprehensive metabolic panel    Rapid strep screen    UA with Microscopic reflex to Culture    XR Chest 2 Views      Orders Needing Specimen Collection     None      Pending Results     Date and Time Order Name Status Description    8/20/2017 1125 XR Chest 2 Views In process     8/20/2017 1101 Beta strep group A culture In process             Pending Culture Results     Date and Time Order Name Status Description    8/20/2017 1101 Beta strep group A culture In process             Thank you for choosing [facility]       Thank you for choosing [facility] for your care. Our goal is always to provide you with excellent care. Hearing back from our patients is one way we can continue to improve our services. Please take a few minutes to complete the written survey that you may receive in the mail after you visit with us. Thank you!        Care EveryWhere ID     This is your Care EveryWhere ID. This could be used by other organizations to access your [facility] medical records  [id number]        Equal Access to Services     SANDOR ESTRELLA : [place], aye blum, qamani kaalmagrayson umaña, gemini leyva. So [facility] [phone].    ATENCIÓN: Si habla español, tiene a guaman disposición servicios gratuitos de asistencia lingüística. Llame al [phone].    We comply with applicable federal civil rights laws and [place] laws. We do not discriminate on the basis of race, color, national origin, age, disability sex,  sexual orientation or gender identity.            After Visit Summary       This is your record. Keep this with you and show to your community pharmacist(s) and doctor(s) at your next visit.

## 2017-08-20 NOTE — ED NOTES
"Pt presents with sore throat, tight wheezy cough once in a while, tired, \" don't feel good\" for 4 days ago  "

## 2017-08-20 NOTE — DISCHARGE INSTRUCTIONS
Bronchitis, Antibiotic Treatment (Adult)    Bronchitis is an infection of the air passages (bronchial tubes) in your lungs. It often occurs when you have a cold. This illness is contagious during the first few days and is spread through the air by coughing and sneezing, or by direct contact (touching the sick person and then touching your own eyes, nose, or mouth).  Symptoms of bronchitis include cough with mucus (phlegm) and low-grade fever. Bronchitis usually lasts 7 to 14 days. Mild cases can be treated with simple home remedies. More severe infection is treated with an antibiotic.  Home care  Follow these guidelines when caring for yourself at home:    If your symptoms are severe, rest at home for the first 2 to 3 days. When you go back to your usual activities, don't let yourself get too tired.    Do not smoke. Also avoid being exposed to secondhand smoke.    You may use over-the-counter medicines to control fever or pain, unless another medicine was prescribed. (Note: If you have chronic liver or kidney disease or have ever had a stomach ulcer or gastrointestinal bleeding, talk with your healthcare provider before using these medicines. Also talk to your provider if you are taking medicine to prevent blood clots.) Aspirin should never be given to anyone younger than 18 years of age who is ill with a viral infection or fever. It may cause severe liver or brain damage.    Your appetite may be poor, so a light diet is fine. Avoid dehydration by drinking 6 to 8 glasses of fluids per day (such as water, soft drinks, sports drinks, juices, tea, or soup). Extra fluids will help loosen secretions in the nose and lungs.    Over-the-counter cough, cold, and sore-throat medicines will not shorten the length of the illness, but they may be helpful to reduce symptoms. (Note: Do not use decongestants if you have high blood pressure.)    Finish all antibiotic medicine. Do this even if you are feeling better after only a  few days.  Follow-up care  Follow up with your healthcare provider, or as advised. If you had an X-ray or ECG (electrocardiogram), a specialist will review it. You will be notified of any new findings that may affect your care.  Note: If you are age 65 or older, or if you have a chronic lung disease or condition that affects your immune system, or you smoke, talk to your healthcare provider about having pneumococcal vaccinations and a yearly influenza vaccination (flu shot).  When to seek medical advice  Call your healthcare provider right away if any of these occur:    Fever of 100.4 F (38 C) or higher    Coughing up increased amounts of colored sputum    Weakness, drowsiness, headache, facial pain, ear pain, or a stiff neck  Call 911, or get immediate medical care  Contact emergency services right away if any of these occur.    Coughing up blood    Worsening weakness, drowsiness, headache, or stiff neck    Trouble breathing, wheezing, or pain with breathing  Date Last Reviewed: 9/13/2015 2000-2017 [employer]. [address] [place] [address], [place] [address]. All rights reserved. This information is not intended as a substitute for professional medical care. Always follow your healthcare professional's instructions.

## 2017-08-20 NOTE — ED PROVIDER NOTES
History     Chief Complaint   Patient presents with     Pharyngitis     woke with a sore throat today     The history is provided by the patient. No  was used.     Bel Reardon is a 77 year old female who presents with a sore throat for 4 days, worsening symptoms. Also has an occasional nonproductive tight cough, not frequent. Has been taking Zyrtec for hives she has on her chest right now, they've been there for weeks. Also started ativan for her anxiety. No cold medications. BS have been stable, no fever, no sinus congestion, no PND. Has been exposed to a lot of illnesses lately.     I have reviewed the Medications, Allergies, Past Medical and Surgical History, and Social History in the Epic system.    Allergies:   Allergies   Allergen Reactions     Aspirin      Tightness in chest       Erythromycin Base [Kdc:Yellow Dye+Erythromycin+Brilliant Blue Fcf]      Ezetimibe Other (See Comments)     Aches; Zetia       Fenofibrate Micronized [Fenofibrate]      Aches; Tricor     Levofloxacin      Flu-like symptoms.     Lisinopril Other (See Comments)     headache     No Clinical Screening - See Comments      Fenofibrate nanocrystallized; Aches- Tricor     Pravastatin Sodium Other (See Comments)     Myalgia; Pravachol     Dexamethasone Rash     Maxidex     Dexamethasone Sod Phosphate [Dexamethasone Sodium Phosphate] Rash     Maxidex     Niacin Rash     Rosuvastatin Calcium Rash     crestor           No current facility-administered medications on file prior to encounter.   Current Outpatient Prescriptions on File Prior to Encounter:  cetirizine (ZYRTEC) 10 MG tablet Take 1 tablet (10 mg) by mouth every evening   LORazepam (ATIVAN) 1 MG tablet Take 0.5-1 tablets (0.5-1 mg) by mouth every 8 hours as needed for anxiety Take 30 minutes prior to departure.  Do not operate a vehicle after taking this medication   allopurinol (ZYLOPRIM) 100 MG tablet TAKE ONE & ONE-HALF TABLETS BY MOUTH DAILY   valsartan  (DIOVAN) 80 MG tablet TAKE ONE TABLET TWO TIMES A DAY BY MOUTH - GENERIC FOR DIOVAN   ONE TOUCH ULTRA test strip TEST BLOOD SURGARS ONCE A DAY   metFORMIN (GLUCOPHAGE) 500 MG tablet TAKE 1 TABLET WITH SUPPER FOR THE FIRST WEEK, AFTER THE FIRST WEEK TAKE 1 TABLET WITH BREAKFAST AND 1 TABLET WITH SUPPER.   Omega-3 Fatty Acids (FISH OIL) 1000 MG CPDR Take 1 capsule by mouth daily   blood glucose monitoring (ONE TOUCH ULTRA MINI) meter device kit 1 kit by In Vitro route daily Use to test blood sugar 1 times daily or as directed.   levothyroxine (SYNTHROID/LEVOTHROID) 25 MCG tablet TAKE 1 TABLET DAILY BY MOUT H - GENERIC FOR SYNTHROID   blood glucose monitoring (ONE TOUCH DELICA) lancets Use to test blood sugar 1 times daily or as directed.   clobetasol (TEMOVATE) 0.05 % ointment Apply sparingly to affected area twice daily as needed.  Do not apply to face.       Patient Active Problem List   Diagnosis     Advance care planning     Mixed hyperlipidemia     Postmenopausal bleeding     Type 2 diabetes mellitus without complication, without long-term current use of insulin (H)     Benign essential hypertension     Chronic right-sided low back pain with right-sided sciatica     Combined forms of age-related cataract       Past Surgical History:   Procedure Laterality Date     COLONOSCOPY N/A 8/21/2015    no further scopes needed.  Surgeon: Yohan Licona DO;  Location: [facility]     DILATION AND CURETTAGE, OPERATIVE HYSTEROSCOPY, COMBINED N/A 1/13/2016    Procedure: COMBINED DILATION AND CURETTAGE, OPERATIVE HYSTEROSCOPY;  Surgeon: Seven Kern MD;  Location: [facility]     left shoulder  1990    nerve damage  s/p trauma; from repetitious work     lumpectomy left breast  1990    benign -- fibroadenoma-left; Facility; [facility]     PHACOEMULSIFICATION WITH STANDARD INTRAOCULAR LENS IMPLANT Left 11/11/2014    Procedure: PHACOEMULSIFICATION WITH STANDARD INTRAOCULAR LENS IMPLANT;  Surgeon: Bobby Arambula MD;  Location: [facility]  "      Social History   Substance Use Topics     Smoking status: Former Smoker     Years: 14.00     Types: Cigarettes     Smokeless tobacco: Never Used      Comment: 20.00 per day; quit 1991     Alcohol use 0.0 oz/week     0 Standard drinks or equivalent per week      Comment: 1 beer weekly       Most Recent Immunizations   Administered Date(s) Administered     Influenza (High Dose) 3 valent vaccine 10/16/2016     Influenza (IIV3) 11/01/2011     Influenza (intradermal) 01/08/2013     Pneumococcal (PCV 13) 09/15/2016     Pneumococcal 23 valent 11/25/2008     TDAP Vaccine (Adacel) 11/25/2008       BMI: Estimated body mass index is 34.01 kg/(m^2) as calculated from the following:    Height as of 8/9/17: 1.549 m (5' 1\").    Weight as of 8/9/17: 81.6 kg (180 lb).      Review of Systems   Constitutional: Positive for fatigue. Negative for fever.   HENT: Positive for sore throat. Negative for congestion and rhinorrhea.    Respiratory: Positive for cough and wheezing.    Cardiovascular: Negative for chest pain.   Gastrointestinal: Negative for abdominal pain.       Physical Exam   BP: (!) 182/98  Pulse: 100  Temp: 98.6  F (37  C)  Resp: 20  SpO2: 95 %  Physical Exam   Constitutional: She is oriented to person, place, and time. She appears well-developed and well-nourished.   HENT:   Head: Normocephalic and atraumatic.   Left Ear: External ear normal.   Nose: Nose normal.   Mouth/Throat: Oropharynx is clear and moist. No oropharyngeal exudate.   Eyes: Conjunctivae are normal. No scleral icterus.   Neck: Normal range of motion. Neck supple.   Cardiovascular: Normal rate, regular rhythm and normal heart sounds.    Pulmonary/Chest: Effort normal and breath sounds normal. No respiratory distress. She has no wheezes.   Lymphadenopathy:     She has no cervical adenopathy.   Neurological: She is alert and oriented to person, place, and time.   Skin: Skin is warm and dry. She is not diaphoretic.   Psychiatric: She has a normal mood " and affect.   Nursing note and vitals reviewed.      ED Course     ED Course     Procedures         Chest XR: Lungs are clear on both views. Pending radiology review.       Labs Ordered and Resulted from Time of ED Arrival Up to the Time of Departure from the ED   COMPREHENSIVE METABOLIC PANEL - Abnormal; Notable for the following:        Result Value    Glucose 174 (*)     ALT 69 (*)     AST 67 (*)     All other components within normal limits   ROUTINE UA WITH MICROSCOPIC REFLEX TO CULTURE - Abnormal; Notable for the following:     Specific Gravity Urine 1.002 (*)     Leukocyte Esterase Urine Trace (*)     Bacteria Urine Few (*)     Squamous Epithelial /HPF Urine 4 (*)     Mucous Urine Present (*)     All other components within normal limits   CBC WITH PLATELETS DIFFERENTIAL   RAPID STREP SCREEN   BETA STREP GROUP A CULTURE       Assessments & Plan (with Medical Decision Making)     I have reviewed the nursing notes.    I have reviewed the findings, diagnosis, plan and need for follow up with the patient.  Reviewed pt with ED provider, Dr. DELPHINE Torres, advised to monitor BP, likely anxiety.     Discussed with patient, she states her anxiety is causing her BP and HR to be elevated.   Will follow up with PCP on this.     Regarding illness today, CXR and labs reviewed - will treat her with a Zpak as her symptoms are progressively worsening, BS are elevated.     Take prescription medications as directed.  Increase fluids and rest.  Follow up with PCP for re-evaluation in 2-3 days.    Return to ED/UC if symptoms worsen or concerns develop      Discharge Medication List as of 8/20/2017 12:52 PM      START taking these medications    Details   azithromycin (ZITHROMAX Z-ISSA) 250 MG tablet Two tablets on the first day, then one tablet daily for the next 4 days, Disp-6 tablet, R-0, E-Prescribe             Final diagnoses:   Tonsillitis   Bronchitis   Essential hypertension   Anxiety       8/20/2017   [facility]     Mela Walden NP  08/20/17 7331

## 2017-08-22 LAB
BACTERIA SPEC CULT: NORMAL
SPECIMEN SOURCE: NORMAL

## 2017-08-30 ENCOUNTER — OFFICE VISIT (OUTPATIENT)
Dept: FAMILY MEDICINE | Facility: OTHER | Age: 77
End: 2017-08-30
Attending: FAMILY MEDICINE
Payer: COMMERCIAL

## 2017-08-30 VITALS
HEIGHT: 62 IN | WEIGHT: 171 LBS | TEMPERATURE: 97.8 F | SYSTOLIC BLOOD PRESSURE: 158 MMHG | OXYGEN SATURATION: 96 % | BODY MASS INDEX: 31.47 KG/M2 | DIASTOLIC BLOOD PRESSURE: 82 MMHG | HEART RATE: 63 BPM

## 2017-08-30 DIAGNOSIS — J18.9 PNEUMONIA DUE TO INFECTIOUS ORGANISM, UNSPECIFIED LATERALITY, UNSPECIFIED PART OF LUNG: ICD-10-CM

## 2017-08-30 DIAGNOSIS — J06.9 VIRAL URI WITH COUGH: Primary | ICD-10-CM

## 2017-08-30 DIAGNOSIS — E11.9 TYPE 2 DIABETES MELLITUS WITHOUT COMPLICATION, WITHOUT LONG-TERM CURRENT USE OF INSULIN (H): ICD-10-CM

## 2017-08-30 PROCEDURE — 99212 OFFICE O/P EST SF 10 MIN: CPT

## 2017-08-30 PROCEDURE — 99213 OFFICE O/P EST LOW 20 MIN: CPT | Performed by: FAMILY MEDICINE

## 2017-08-30 ASSESSMENT — ANXIETY QUESTIONNAIRES
1. FEELING NERVOUS, ANXIOUS, OR ON EDGE: NOT AT ALL
GAD7 TOTAL SCORE: 0
4. TROUBLE RELAXING: NOT AT ALL
2. NOT BEING ABLE TO STOP OR CONTROL WORRYING: NOT AT ALL
5. BEING SO RESTLESS THAT IT IS HARD TO SIT STILL: NOT AT ALL
7. FEELING AFRAID AS IF SOMETHING AWFUL MIGHT HAPPEN: NOT AT ALL
3. WORRYING TOO MUCH ABOUT DIFFERENT THINGS: NOT AT ALL
6. BECOMING EASILY ANNOYED OR IRRITABLE: NOT AT ALL

## 2017-08-30 ASSESSMENT — PAIN SCALES - GENERAL: PAINLEVEL: NO PAIN (0)

## 2017-08-30 NOTE — NURSING NOTE
"Chief Complaint   Patient presents with     ER F/U       Initial /88 (BP Location: Left arm, Patient Position: Chair, Cuff Size: Adult Regular)  Pulse 63  Temp 97.8  F (36.6  C) (Tympanic)  Ht 5' 1.5\" (1.562 m)  Wt 171 lb (77.6 kg)  SpO2 96%  BMI 31.79 kg/m2 Estimated body mass index is 31.79 kg/(m^2) as calculated from the following:    Height as of this encounter: 5' 1.5\" (1.562 m).    Weight as of this encounter: 171 lb (77.6 kg).  Medication Reconciliation: complete     Amna Fulton     "

## 2017-08-30 NOTE — MR AVS SNAPSHOT
After Visit Summary   8/30/2017    Bel Reardon    MRN: [id number]           Patient Information     Date Of Birth          1940        Visit Information        Provider Department      8/30/2017 9:30 AM Jennie Rubio MD [facility]        Today's Diagnoses     Viral URI with cough    -  1    Pneumonia due to infectious organism, unspecified laterality, unspecified part of lung        Type 2 diabetes mellitus without complication, without long-term current use of insulin (H)           Follow-ups after your visit        Your next 10 appointments already scheduled     Sep 21, 2017  1:30 PM CDT   (Arrive by 1:15 PM)   SHORT with Seven Kern MD   [facility] [place] ([facility] [place] )    [address]  [place] [address]   [phone]            Nov 01, 2017  8:30 AM CDT   LAB with HC LAB   [facility] ([facility] [place] )    [address]  [place] [address]   [phone]            Nov 01, 2017  8:45 AM CDT   (Arrive by 8:30 AM)   SHORT with Jennie Rubio MD   [facility] ([facility] [place] )    [address]  [place] [address]   [phone]              Who to contact     If you have questions or need follow up information about today's clinic visit or your schedule please contact [facility] directly at [phone].  Normal or non-critical lab and imaging results will be communicated to you by MyChart, letter or phone within 4 business days after the clinic has received the results. If you do not hear from us within 7 days, please contact the clinic through MyChart or phone. If you have a critical or abnormal lab result, we will notify you by phone as soon as possible.  Submit refill requests through [employer] or call your pharmacy and they will forward the refill request to us. Please allow 3 business days for your refill to be completed.          Additional  "Information About Your Visit        Care EveryWhere ID     This is your Care EveryWhere ID. This could be used by other organizations to access your [place] medical records  [id number]        Your Vitals Were     Pulse Temperature Height Pulse Oximetry BMI (Body Mass Index)       63 97.8  F (36.6  C) (Tympanic) 5' 1.5\" (1.562 m) 96% 31.79 kg/m2        Blood Pressure from Last 3 Encounters:   08/30/17 158/82   08/20/17 (!) 192/107   08/09/17 134/80    Weight from Last 3 Encounters:   08/30/17 171 lb (77.6 kg)   08/09/17 180 lb (81.6 kg)   07/25/17 180 lb (81.6 kg)              Today, you had the following     No orders found for display         Where to get your medicines      These medications were sent to [id number] - [place], [place] - [address]  [address], [place] [address]     Phone:  [phone]     blood glucose monitoring lancets          Primary Care Provider Office Phone # Fax #    Jennie Rubio [phone]       [facility] [place] [address]  [place] [address]        Equal Access to Services     SANDOR ESTRELLA AH: Hadii aad ku hadasho Soomaali, waaxda luqadaha, qaybta kaalmada adeegyada, waxay idiin haymatilden brittany betancourt . So [facility] [phone].    ATENCIÓN: Si habla español, tiene a guaman disposición servicios gratuitos de asistencia lingüística. Llame al [phone].    We comply with applicable federal civil rights laws and [place] laws. We do not discriminate on the basis of race, color, national origin, age, disability sex, sexual orientation or gender identity.            Thank you!     Thank you for choosing [facility]  for your care. Our goal is always to provide you with excellent care. Hearing back from our patients is one way we can continue to improve our services. Please take a few minutes to complete the written survey that you may receive in the mail after your visit with us. Thank you!             Your Updated " Medication List - Protect others around you: Learn how to safely use, store and throw away your medicines at www.disposemymeds.org.          This list is accurate as of: 8/30/17  9:43 AM.  Always use your most recent med list.                   Brand Name Dispense Instructions for use Diagnosis    allopurinol 100 MG tablet    ZYLOPRIM    135 tablet    TAKE ONE & ONE-HALF TABLETS BY MOUTH DAILY    Idiopathic gout, unspecified chronicity, unspecified site       blood glucose monitoring lancets     3 each    Use to test blood sugar 1 times daily or as directed.    Type 2 diabetes mellitus without complication, without long-term current use of insulin (H)       blood glucose monitoring meter device kit     1 kit    1 kit by In Vitro route daily Use to test blood sugar 1 times daily or as directed.    Type 2 diabetes mellitus without complication, without long-term current use of insulin (H)       Fish Oil 1000 MG Cpdr      Take 1 capsule by mouth daily        levothyroxine 25 MCG tablet    SYNTHROID/LEVOTHROID    90 tablet    TAKE 1 TABLET DAILY BY MOUT H - GENERIC FOR SYNTHROID    Other specified hypothyroidism       LORazepam 1 MG tablet    ATIVAN    20 tablet    Take 0.5-1 tablets (0.5-1 mg) by mouth every 8 hours as needed for anxiety Take 30 minutes prior to departure.  Do not operate a vehicle after taking this medication    Adjustment disorder with anxious mood       metFORMIN 500 MG tablet    GLUCOPHAGE    60 tablet    TAKE 1 TABLET WITH SUPPER FOR THE FIRST WEEK, AFTER THE FIRST WEEK TAKE 1 TABLET WITH BREAKFAST AND 1 TABLET WITH SUPPER.    Type 2 diabetes mellitus without complication, without long-term current use of insulin (H)       ONE TOUCH ULTRA test strip   Generic drug:  blood glucose monitoring     100 each    TEST BLOOD SURGARS ONCE A DAY    Type 2 diabetes mellitus without complication (H)       valsartan 80 MG tablet    DIOVAN    180 tablet    TAKE ONE TABLET TWO TIMES A DAY BY MOUTH - GENERIC FOR  DIOVAN    Benign essential hypertension

## 2017-08-31 ASSESSMENT — ANXIETY QUESTIONNAIRES: GAD7 TOTAL SCORE: 0

## 2017-09-11 ENCOUNTER — OFFICE VISIT (OUTPATIENT)
Dept: FAMILY MEDICINE | Facility: OTHER | Age: 77
End: 2017-09-11
Attending: FAMILY MEDICINE
Payer: COMMERCIAL

## 2017-09-11 VITALS
DIASTOLIC BLOOD PRESSURE: 76 MMHG | OXYGEN SATURATION: 98 % | SYSTOLIC BLOOD PRESSURE: 148 MMHG | HEART RATE: 84 BPM | WEIGHT: 178.5 LBS | HEIGHT: 61 IN | BODY MASS INDEX: 33.7 KG/M2 | TEMPERATURE: 97.7 F

## 2017-09-11 DIAGNOSIS — F41.1 GAD (GENERALIZED ANXIETY DISORDER): ICD-10-CM

## 2017-09-11 DIAGNOSIS — L50.9 HIVES: Primary | ICD-10-CM

## 2017-09-11 PROCEDURE — 99213 OFFICE O/P EST LOW 20 MIN: CPT | Performed by: FAMILY MEDICINE

## 2017-09-11 PROCEDURE — 99212 OFFICE O/P EST SF 10 MIN: CPT

## 2017-09-11 RX ORDER — HYDROCORTISONE 2.5 %
CREAM (GRAM) TOPICAL 2 TIMES DAILY
Qty: 30 G | Refills: 0 | Status: SHIPPED | OUTPATIENT
Start: 2017-09-11 | End: 2018-06-13

## 2017-09-11 ASSESSMENT — ANXIETY QUESTIONNAIRES
1. FEELING NERVOUS, ANXIOUS, OR ON EDGE: NOT AT ALL
6. BECOMING EASILY ANNOYED OR IRRITABLE: NOT AT ALL
2. NOT BEING ABLE TO STOP OR CONTROL WORRYING: NOT AT ALL
3. WORRYING TOO MUCH ABOUT DIFFERENT THINGS: NOT AT ALL
5. BEING SO RESTLESS THAT IT IS HARD TO SIT STILL: NOT AT ALL
GAD7 TOTAL SCORE: 0
IF YOU CHECKED OFF ANY PROBLEMS ON THIS QUESTIONNAIRE, HOW DIFFICULT HAVE THESE PROBLEMS MADE IT FOR YOU TO DO YOUR WORK, TAKE CARE OF THINGS AT HOME, OR GET ALONG WITH OTHER PEOPLE: NOT DIFFICULT AT ALL
4. TROUBLE RELAXING: NOT AT ALL
7. FEELING AFRAID AS IF SOMETHING AWFUL MIGHT HAPPEN: NOT AT ALL

## 2017-09-11 ASSESSMENT — PATIENT HEALTH QUESTIONNAIRE - PHQ9: SUM OF ALL RESPONSES TO PHQ QUESTIONS 1-9: 0

## 2017-09-11 ASSESSMENT — PAIN SCALES - GENERAL: PAINLEVEL: NO PAIN (0)

## 2017-09-11 NOTE — NURSING NOTE
"Chief Complaint   Patient presents with     Hives       Initial /90 (BP Location: Right arm, Patient Position: Sitting)  Pulse 84  Temp 97.7  F (36.5  C)  Ht 5' 1\" (1.549 m)  Wt 178 lb 8 oz (81 kg)  SpO2 98%  BMI 33.73 kg/m2 Estimated body mass index is 33.73 kg/(m^2) as calculated from the following:    Height as of this encounter: 5' 1\" (1.549 m).    Weight as of this encounter: 178 lb 8 oz (81 kg).  Medication Reconciliation: complete   Margarette Zayas        "

## 2017-09-11 NOTE — PATIENT INSTRUCTIONS
Psychologists/ counselors  [place]  [phone]  Dr. Ian Candelaria [phone]  [facility]  [phone]  [facility] [phone]  Eris Vieyra  [phone]   [employer]  [phone]  (kids)  [employer] [phone]  (teens)  [employer]   [phone]  Counseling  [facility]      [phone]  [facility] [phone]    [facility]     [phone]   [facility] counseling [phone]  [facility]  [phone]  Christian Chun [phone]  Sue Mcdonough [phone]  [facility]     [phone]  Children[facility] behavioral/ adult family     [phone]  [facility]     [phone]  [facility]     [phone]  Maye Marcus  [phone]  [facility]      [phone]  [facility] Dr. KAMRON Licona     [phone]  [facility] Psychological Services     [phone]

## 2017-09-11 NOTE — MR AVS SNAPSHOT
After Visit Summary   9/11/2017    Bel Reardon    MRN: [id number]           Patient Information     Date Of Birth          1940        Visit Information        Provider Department      9/11/2017 1:30 PM Jennie Rubio MD [facility]        Today's Diagnoses     Hives    -  1    ROMEO (generalized anxiety disorder)          Care Instructions    Psychologists/ counselors  [place]  [phone]  Dr. Ian Candelaria [phone]  [facility]  [phone]  [facility] [phone]  Eris Vieyra  [phone]   [facility]  [phone]  (kids)  [facility] [phone]  (teens)  [employer]   [phone]  Counseling  [facility] Behavioral Health      [phone]  [facility] Mental Health [phone]    [facility]     [phone]   [facility] counseling [phone]  [facility]  [phone]  Christian Chun [phone]  Sue Mcdonough [phone]  [facility]     [phone]  Children[facility] behavioral/ adult family     [phone]  [facility] Wellness     [phone]  [facility]     [phone]  [place]  Bahman Marcus  [phone]  [facility]      [phone]  [facility] Dr. KAMRON Licona     [phone]  [facility] Psychological Services     [phone]                        Follow-ups after your visit        Your next 10 appointments already scheduled     Sep 21, 2017  1:30 PM CDT   (Arrive by 1:15 PM)   SHORT with Seven Kern MD   [facility] [place] ([facility] [place] )    [address]  [place] [address]   [phone]            Nov 01, 2017  8:30 AM CDT   LAB with  LAB   [facility] [place] ([facility] [place] )    [address]  [place] [address]   [phone]            Nov 01, 2017  8:45 AM CDT   (Arrive by 8:30 AM)   SHORT with Jennie Rubio MD   [facility]  "([facility] )    [address] [place] [address]   [phone]              Who to contact     If you have questions or need follow up information about today's clinic visit or your schedule please contact [facility] directly at [phone].  Normal or non-critical lab and imaging results will be communicated to you by MyChart, letter or phone within 4 business days after the clinic has received the results. If you do not hear from us within 7 days, please contact the clinic through MyChart or phone. If you have a critical or abnormal lab result, we will notify you by phone as soon as possible.  Submit refill requests through [employer] or call your pharmacy and they will forward the refill request to us. Please allow 3 business days for your refill to be completed.          Additional Information About Your Visit        Care EveryWhere ID     This is your Care EveryWhere ID. This could be used by other organizations to access your [place] medical records  [id number]        Your Vitals Were     Pulse Temperature Height Pulse Oximetry BMI (Body Mass Index)       84 97.7  F (36.5  C) 5' 1\" (1.549 m) 98% 33.73 kg/m2        Blood Pressure from Last 3 Encounters:   09/11/17 148/76   08/30/17 158/82   08/20/17 (!) 192/107    Weight from Last 3 Encounters:   09/11/17 178 lb 8 oz (81 kg)   08/30/17 171 lb (77.6 kg)   08/09/17 180 lb (81.6 kg)              Today, you had the following     No orders found for display         Today's Medication Changes          These changes are accurate as of: 9/11/17  1:50 PM.  If you have any questions, ask your nurse or doctor.               Start taking these medicines.        Dose/Directions    hydrocortisone 2.5 % cream   Used for:  Hives   Started by:  Jnenie Rubio MD        Apply topically 2 times daily   Quantity:  30 g   Refills:  0       sertraline 50 MG tablet   Commonly known as:  ZOLOFT   Used for:  ROMEO (generalized anxiety " disorder)   Started by:  Jennie Rubio MD        Dose:  25 mg   Take 0.5 tablets (25 mg) by mouth daily   Quantity:  30 tablet   Refills:  1            Where to get your medicines      These medications were sent to [id number] - [place], [place] - [address]  [address] [facility], [place] [address]     Phone:  [phone]     hydrocortisone 2.5 % cream    sertraline 50 MG tablet                Primary Care Provider Office Phone # Fax #    Jennie Rubio [phone]       [facility] [address]  [place] [address]        Equal Access to Services     [facility]: Hadii aad ku hadasho Soomaali, waaxda luqadaha, qaybta kaalmada adeegyada, waxay normanin haymatilden adeiain betancourt . So [facility] [phone].    ATENCIÓN: Si habla español, tiene a guaman disposición servicios gratuitos de asistencia lingüística. Llame al [phone].    We comply with applicable federal civil rights laws and [place] laws. We do not discriminate on the basis of race, color, national origin, age, disability sex, sexual orientation or gender identity.            Thank you!     Thank you for choosing [facility]  for your care. Our goal is always to provide you with excellent care. Hearing back from our patients is one way we can continue to improve our services. Please take a few minutes to complete the written survey that you may receive in the mail after your visit with us. Thank you!             Your Updated Medication List - Protect others around you: Learn how to safely use, store and throw away your medicines at www.disposemymeds.org.          This list is accurate as of: 9/11/17  1:50 PM.  Always use your most recent med list.                   Brand Name Dispense Instructions for use Diagnosis    allopurinol 100 MG tablet    ZYLOPRIM    135 tablet    TAKE ONE & ONE-HALF TABLETS BY MOUTH DAILY    Idiopathic gout, unspecified chronicity, unspecified site       blood  glucose monitoring lancets     3 each    Use to test blood sugar 1 times daily or as directed.    Type 2 diabetes mellitus without complication, without long-term current use of insulin (H)       blood glucose monitoring meter device kit     1 kit    1 kit by In Vitro route daily Use to test blood sugar 1 times daily or as directed.    Type 2 diabetes mellitus without complication, without long-term current use of insulin (H)       Fish Oil 1000 MG Cpdr      Take 1 capsule by mouth daily        hydrocortisone 2.5 % cream     30 g    Apply topically 2 times daily    Hives       levothyroxine 25 MCG tablet    SYNTHROID/LEVOTHROID    90 tablet    TAKE 1 TABLET DAILY BY MOUT H - GENERIC FOR SYNTHROID    Other specified hypothyroidism       LORazepam 1 MG tablet    ATIVAN    20 tablet    Take 0.5-1 tablets (0.5-1 mg) by mouth every 8 hours as needed for anxiety Take 30 minutes prior to departure.  Do not operate a vehicle after taking this medication    Adjustment disorder with anxious mood       metFORMIN 500 MG tablet    GLUCOPHAGE    60 tablet    TAKE 1 TABLET WITH SUPPER FOR THE FIRST WEEK, AFTER THE FIRST WEEK TAKE 1 TABLET WITH BREAKFAST AND 1 TABLET WITH SUPPER.    Type 2 diabetes mellitus without complication, without long-term current use of insulin (H)       ONE TOUCH ULTRA test strip   Generic drug:  blood glucose monitoring     100 each    TEST BLOOD SURGARS ONCE A DAY    Type 2 diabetes mellitus without complication (H)       sertraline 50 MG tablet    ZOLOFT    30 tablet    Take 0.5 tablets (25 mg) by mouth daily    ROMEO (generalized anxiety disorder)       valsartan 80 MG tablet    DIOVAN    180 tablet    TAKE ONE TABLET TWO TIMES A DAY BY MOUTH - GENERIC FOR DIOVAN    Benign essential hypertension

## 2017-09-11 NOTE — PROGRESS NOTES
SUBJECTIVE:                                                    Bel Reardon is a 77 year old female who presents to clinic today for the following health issues:        Hives      Duration: for the last couple months    Description (location/character/radiation): neck, chest,     Intensity:  moderate    Accompanying signs and symptoms: itchy, and red at times worse    History (similar episodes/previous evaluation): None    Precipitating or alleviating factors: None    Therapies tried and outcome: None         Problem list and histories reviewed & adjusted, as indicated.  Additional history: as documented    Current Outpatient Prescriptions   Medication Sig Dispense Refill     hydrocortisone 2.5 % cream Apply topically 2 times daily 30 g 0     blood glucose monitoring (ONE TOUCH DELICA) lancets Use to test blood sugar 1 times daily or as directed. 3 each 11     LORazepam (ATIVAN) 1 MG tablet Take 0.5-1 tablets (0.5-1 mg) by mouth every 8 hours as needed for anxiety Take 30 minutes prior to departure.  Do not operate a vehicle after taking this medication 20 tablet 0     allopurinol (ZYLOPRIM) 100 MG tablet TAKE ONE & ONE-HALF TABLETS BY MOUTH DAILY 135 tablet 1     valsartan (DIOVAN) 80 MG tablet TAKE ONE TABLET TWO TIMES A DAY BY MOUTH - GENERIC FOR DIOVAN 180 tablet 1     ONE TOUCH ULTRA test strip TEST BLOOD SURGARS ONCE A  each 3     metFORMIN (GLUCOPHAGE) 500 MG tablet TAKE 1 TABLET WITH SUPPER FOR THE FIRST WEEK, AFTER THE FIRST WEEK TAKE 1 TABLET WITH BREAKFAST AND 1 TABLET WITH SUPPER. 60 tablet 3     Omega-3 Fatty Acids (FISH OIL) 1000 MG CPDR Take 1 capsule by mouth daily       blood glucose monitoring (ONE TOUCH ULTRA MINI) meter device kit 1 kit by In Vitro route daily Use to test blood sugar 1 times daily or as directed. 1 kit 0     levothyroxine (SYNTHROID/LEVOTHROID) 25 MCG tablet TAKE 1 TABLET DAILY BY MOUT H - GENERIC FOR SYNTHROID 90 tablet 3     Labs reviewed in  "EPIC    ROS:  Constitutional, HEENT, cardiovascular, pulmonary, gi and gu systems are negative, except as otherwise noted.      OBJECTIVE:                                                    /76 (BP Location: Right arm)  Pulse 84  Temp 97.7  F (36.5  C)  Ht 5' 1\" (1.549 m)  Wt 178 lb 8 oz (81 kg)  SpO2 98%  BMI 33.73 kg/m2  Body mass index is 33.73 kg/(m^2).  GENERAL APPEARANCE: healthy, alert and no distress  SKIN: erythema - and upper chest  PSYCH: mentation appears normal and affect normal/bright       ASSESSMENT/PLAN:                                                    1. Hives  This worked for her before  - hydrocortisone 2.5 % cream; Apply topically 2 times daily  Dispense: 30 g; Refill: 0    Patient was agreeable to this plan and had no further questions.  See Patient Instructions    Jennie Rubio MD  [facility] [place]    "

## 2017-09-12 DIAGNOSIS — E11.9 TYPE 2 DIABETES MELLITUS WITHOUT COMPLICATION, WITHOUT LONG-TERM CURRENT USE OF INSULIN (H): ICD-10-CM

## 2017-09-12 ASSESSMENT — ANXIETY QUESTIONNAIRES: GAD7 TOTAL SCORE: 0

## 2017-09-12 NOTE — TELEPHONE ENCOUNTER
Metformin         Last Written Prescription Date: 4/27/17  Last Fill Quantity: 60, # refills: 3  Last Office Visit with [facility], [facility] or [facility] prescribing provider:  9/11/17   Next 5 appointments (look out 90 days)     Sep 21, 2017  1:30 PM CDT   (Arrive by 1:15 PM)   SHORT with Seven Kern MD   [facility] [place] ([facility] [place] [facility]    [address]  [place] [address]   [phone]            Nov 01, 2017  8:45 AM CDT   (Arrive by 8:30 AM)   SHORT with Jennie Rubio MD   [facility] [place] ([facility] [place] )    [address]  [place] [address]   [phone]                   BP Readings from Last 3 Encounters:   09/11/17 148/76   08/30/17 158/82   08/20/17 (!) 192/107     Lab Results   Component Value Date    MICROL 22 06/28/2017     Lab Results   Component Value Date    UMALCR 15.46 06/28/2017     Creatinine   Date Value Ref Range Status   08/20/2017 0.65 0.52 - 1.04 mg/dL Final   ]  GFR Estimate   Date Value Ref Range Status   08/20/2017 89 >60 mL/min/1.7m2 Final     Comment:     Non  GFR Calc   03/20/2017 67 >60 mL/min/1.7m2 Final     Comment:     Non  GFR Calc   12/14/2016 77 >60 mL/min/1.7m2 Final     Comment:     Non  GFR Calc     GFR Estimate If Black   Date Value Ref Range Status   08/20/2017 >90 >60 mL/min/1.7m2 Final     Comment:      GFR Calc   03/20/2017 82 >60 mL/min/1.7m2 Final     Comment:      GFR Calc   12/14/2016 >90   GFR Calc   >60 mL/min/1.7m2 Final     Lab Results   Component Value Date    CHOL 282 03/20/2017     Lab Results   Component Value Date    HDL 53 03/20/2017     Lab Results   Component Value Date     03/20/2017     Lab Results   Component Value Date    TRIG 340 03/20/2017     Lab Results   Component Value Date    CHOLHDLRATIO 5.7 08/18/2015     Lab Results   Component Value Date    AST 67 08/20/2017     Lab Results    Component Value Date    ALT 69 08/20/2017     Lab Results   Component Value Date    A1C 6.7 06/28/2017    A1C 6.5 03/20/2017    A1C 6.7 12/14/2016    A1C 6.8 09/15/2016    A1C 6.8 06/09/2016     Potassium   Date Value Ref Range Status   08/20/2017 3.7 3.4 - 5.3 mmol/L Final

## 2017-09-21 ENCOUNTER — OFFICE VISIT (OUTPATIENT)
Dept: OBGYN | Facility: OTHER | Age: 77
End: 2017-09-21
Attending: OBSTETRICS & GYNECOLOGY
Payer: COMMERCIAL

## 2017-09-21 VITALS
HEART RATE: 64 BPM | WEIGHT: 171 LBS | OXYGEN SATURATION: 97 % | DIASTOLIC BLOOD PRESSURE: 86 MMHG | SYSTOLIC BLOOD PRESSURE: 148 MMHG | HEIGHT: 61 IN | BODY MASS INDEX: 32.28 KG/M2

## 2017-09-21 DIAGNOSIS — N95.0 POSTMENOPAUSAL BLEEDING: ICD-10-CM

## 2017-09-21 DIAGNOSIS — N90.4 LICHEN SCLEROSUS ET ATROPHICUS OF THE VULVA: Primary | ICD-10-CM

## 2017-09-21 PROCEDURE — 99213 OFFICE O/P EST LOW 20 MIN: CPT | Performed by: OBSTETRICS & GYNECOLOGY

## 2017-09-21 PROCEDURE — 99212 OFFICE O/P EST SF 10 MIN: CPT | Performed by: OBSTETRICS & GYNECOLOGY

## 2017-09-21 ASSESSMENT — PAIN SCALES - GENERAL: PAINLEVEL: NO PAIN (0)

## 2017-09-21 NOTE — NURSING NOTE
"Chief Complaint   Patient presents with     RECHECK     follow up from office hysteroscopy 3/7/17       Initial /86 (BP Location: Left arm, Cuff Size: Adult Large)  Pulse 64  Ht 5' 1\" (1.549 m)  Wt 171 lb (77.6 kg)  SpO2 97%  BMI 32.31 kg/m2 Estimated body mass index is 32.31 kg/(m^2) as calculated from the following:    Height as of this encounter: 5' 1\" (1.549 m).    Weight as of this encounter: 171 lb (77.6 kg).  Medication Reconciliation: complete     Nora Laboy      "

## 2017-09-21 NOTE — MR AVS SNAPSHOT
"              After Visit Summary   9/21/2017    Bel Reardon    MRN: [id number]           Patient Information     Date Of Birth          1940        Visit Information        Provider Department      9/21/2017 1:30 PM Seven Kern MD [facility]        Today's Diagnoses     Lichen sclerosus et atrophicus of the vulva    -  1      Care Instructions    F/u 1 yr          Follow-ups after your visit        Your next 10 appointments already scheduled     Nov 01, 2017  8:30 AM CDT   LAB with HC LAB   [facility] ([facility] [place] )    [address]  [place] [address]   [phone]            Nov 01, 2017  8:45 AM CDT   (Arrive by 8:30 AM)   SHORT with Jennie Rubio MD   [facility] [place] ([facility] [place] )    [address]  [place] [address]   [phone]              Who to contact     If you have questions or need follow up information about today's clinic visit or your schedule please contact [facility] directly at [phone].  Normal or non-critical lab and imaging results will be communicated to you by MyChart, letter or phone within 4 business days after the clinic has received the results. If you do not hear from us within 7 days, please contact the clinic through MyChart or phone. If you have a critical or abnormal lab result, we will notify you by phone as soon as possible.  Submit refill requests through [employer] or call your pharmacy and they will forward the refill request to us. Please allow 3 business days for your refill to be completed.          Additional Information About Your Visit        Care EveryWhere ID     This is your Care EveryWhere ID. This could be used by other organizations to access your [facility] medical records  [id number]        Your Vitals Were     Pulse Height Pulse Oximetry BMI (Body Mass Index)          64 5' 1\" (1.549 m) 97% 32.31 kg/m2         Blood Pressure from Last 3 " Encounters:   09/21/17 148/86   09/11/17 148/76   08/30/17 158/82    Weight from Last 3 Encounters:   09/21/17 171 lb (77.6 kg)   09/11/17 178 lb 8 oz (81 kg)   08/30/17 171 lb (77.6 kg)              Today, you had the following     No orders found for display       Primary Care Provider Office Phone # Fax #    Jennie Rubio [phone]       [facility] [place] [address]  [place] [address]        Equal Access to Services     [facility]: Hadii aad ku hadasho Soomaali, waaxda luqadaha, qaybta kaalmada adeegyada, gemini betancourt . So [facility] [phone].    ATENCIÓN: Si habla español, tiene a guaman disposición servicios gratuitos de asistencia lingüística. [facility] [phone].    We comply with applicable federal civil rights laws and [place] laws. We do not discriminate on the basis of race, color, national origin, age, disability sex, sexual orientation or gender identity.            Thank you!     Thank you for choosing [facility]  for your care. Our goal is always to provide you with excellent care. Hearing back from our patients is one way we can continue to improve our services. Please take a few minutes to complete the written survey that you may receive in the mail after your visit with us. Thank you!             Your Updated Medication List - Protect others around you: Learn how to safely use, store and throw away your medicines at www.disposemymeds.org.          This list is accurate as of: 9/21/17  1:46 PM.  Always use your most recent med list.                   Brand Name Dispense Instructions for use Diagnosis    allopurinol 100 MG tablet    ZYLOPRIM    135 tablet    TAKE ONE & ONE-HALF TABLETS BY MOUTH DAILY    Idiopathic gout, unspecified chronicity, unspecified site       blood glucose monitoring lancets     3 each    Use to test blood sugar 1 times daily or as directed.    Type 2 diabetes mellitus without complication, without  long-term current use of insulin (H)       blood glucose monitoring meter device kit     1 kit    1 kit by In Vitro route daily Use to test blood sugar 1 times daily or as directed.    Type 2 diabetes mellitus without complication, without long-term current use of insulin (H)       Fish Oil 1000 MG Cpdr      Take 1 capsule by mouth daily        hydrocortisone 2.5 % cream     30 g    Apply topically 2 times daily    Hives       levothyroxine 25 MCG tablet    SYNTHROID/LEVOTHROID    90 tablet    TAKE 1 TABLET DAILY BY MOUT H - GENERIC FOR SYNTHROID    Other specified hypothyroidism       LORazepam 1 MG tablet    ATIVAN    20 tablet    Take 0.5-1 tablets (0.5-1 mg) by mouth every 8 hours as needed for anxiety Take 30 minutes prior to departure.  Do not operate a vehicle after taking this medication    Adjustment disorder with anxious mood       metFORMIN 500 MG tablet    GLUCOPHAGE    60 tablet    TAKE 1 TABLET BY MOUTH WITH BREAKFAST AND 1 TABLET WITH SUPPER    Type 2 diabetes mellitus without complication, without long-term current use of insulin (H)       ONE TOUCH ULTRA test strip   Generic drug:  blood glucose monitoring     100 each    TEST BLOOD SURGARS ONCE A DAY    Type 2 diabetes mellitus without complication (H)       sertraline 50 MG tablet    ZOLOFT    30 tablet    Take 0.5 tablets (25 mg) by mouth daily    ROMEO (generalized anxiety disorder)       valsartan 80 MG tablet    DIOVAN    180 tablet    TAKE ONE TABLET TWO TIMES A DAY BY MOUTH - GENERIC FOR DIOVAN    Benign essential hypertension

## 2017-09-21 NOTE — PROGRESS NOTES
S:   F/u PMB and vulvar LSE.  See my prior evals.   Pt had hysteroscopy/pap/embx all nml 6 months ago and no bleeding since.  She was placed on clobetasol for the LSE and has been using it intermittently.  Denies vulvar lesions or irritation.  No new complaints.          Patient Active Problem List   Diagnosis     Advance care planning     Mixed hyperlipidemia     Postmenopausal bleeding     Type 2 diabetes mellitus without complication, without long-term current use of insulin (H)     Benign essential hypertension     Chronic right-sided low back pain with right-sided sciatica     Combined forms of age-related cataract     Hives            Past Medical History:   Diagnosis Date     Agoraphobia with panic disorder 03/24/2011     Anxiety 03/24/2011     DM2 (diabetes mellitus, type 2) (H)      Epistaxis      Fibrocystic Breast Disease 03/24/2011     GERD 03/24/2011    Hx H. Pylori     Gout, unspecified 01/04/2011     Hypertension, Benign 03/24/2011     Lichen sclerosus     declines medication     Normal cardiac stress test 02/28/2013 Feb 2013 wnl     Pure hypercholesterolemia 01/13/2009            Past Surgical History:   Procedure Laterality Date     COLONOSCOPY N/A 8/21/2015    no further scopes needed.  Surgeon: Yohan Licona DO;  Location: [facility]     DILATION AND CURETTAGE, OPERATIVE HYSTEROSCOPY, COMBINED N/A 1/13/2016    Procedure: COMBINED DILATION AND CURETTAGE, OPERATIVE HYSTEROSCOPY;  Surgeon: Seven Kern MD;  Location: [facility] OR     left shoulder  1990    nerve damage  s/p trauma; from repetitious work     lumpectomy left breast  1990    benign -- fibroadenoma-left; Facility; [facility]     PHACOEMULSIFICATION WITH STANDARD INTRAOCULAR LENS IMPLANT Left 11/11/2014    Procedure: PHACOEMULSIFICATION WITH STANDARD INTRAOCULAR LENS IMPLANT;  Surgeon: Bobby Arambula MD;  Location: [facility] OR            Social History   Substance Use Topics     Smoking status: Former Smoker     Years: 14.00     Types: Cigarettes      Smokeless tobacco: Never Used      Comment: 20.00 per day; quit 1991     Alcohol use 0.0 oz/week     0 Standard drinks or equivalent per week      Comment: 1 beer weekly            Family History   Problem Relation Age of Onset     C.A.D. Father      Hypertension Father      Other - See Comments Father 78     hyperlipidemia/MI; cause of death     C.A.D. Mother      s/p AMI, pacemaker     DIABETES Mother 95     Hypertension Mother      Other - See Comments Mother      hyperlipidemia/hypothyroid     CANCER Brother 72     lip; cause of death     CANCER Sister 65     lung     Coronary Artery Disease Brother      gallbladder     Other - See Comments Brother 50     AMI x2     Other - See Comments Sister      hyperlipidemia     Hypertension Sister                Allergies   Allergen Reactions     Aspirin      Tightness in chest       Erythromycin Base [Kdc:Yellow Dye+Erythromycin+Brilliant Blue Fcf]      Ezetimibe Other (See Comments)     Aches; Zetia       Fenofibrate Micronized [Fenofibrate]      Aches; Tricor     Levofloxacin      Flu-like symptoms.     Lisinopril Other (See Comments)     headache     No Clinical Screening - See Comments      Fenofibrate nanocrystallized; Aches- Tricor     Pravastatin Sodium Other (See Comments)     Myalgia; Pravachol     Dexamethasone Rash     Maxidex     Dexamethasone Sod Phosphate [Dexamethasone Sodium Phosphate] Rash     Maxidex     Niacin Rash     Rosuvastatin Calcium Rash     crestor              Current Outpatient Prescriptions   Medication Sig Dispense Refill     metFORMIN (GLUCOPHAGE) 500 MG tablet TAKE 1 TABLET BY MOUTH WITH BREAKFAST AND 1 TABLET WITH SUPPER 60 tablet 4     hydrocortisone 2.5 % cream Apply topically 2 times daily 30 g 0     allopurinol (ZYLOPRIM) 100 MG tablet TAKE ONE & ONE-HALF TABLETS BY MOUTH DAILY 135 tablet 1     valsartan (DIOVAN) 80 MG tablet TAKE ONE TABLET TWO TIMES A DAY BY MOUTH - GENERIC FOR DIOVAN 180 tablet 1     Omega-3 Fatty Acids (FISH  "OIL) 1000 MG CPDR Take 1 capsule by mouth daily       levothyroxine (SYNTHROID/LEVOTHROID) 25 MCG tablet TAKE 1 TABLET DAILY BY MOUT H - GENERIC FOR SYNTHROID 90 tablet 3     sertraline (ZOLOFT) 50 MG tablet Take 0.5 tablets (25 mg) by mouth daily (Patient not taking: Reported on 9/21/2017) 30 tablet 1     blood glucose monitoring (ONE TOUCH DELICA) lancets Use to test blood sugar 1 times daily or as directed. (Patient not taking: Reported on 9/21/2017) 3 each 11     LORazepam (ATIVAN) 1 MG tablet Take 0.5-1 tablets (0.5-1 mg) by mouth every 8 hours as needed for anxiety Take 30 minutes prior to departure.  Do not operate a vehicle after taking this medication (Patient not taking: Reported on 9/21/2017) 20 tablet 0     ONE TOUCH ULTRA test strip TEST BLOOD SURGARS ONCE A DAY (Patient not taking: Reported on 9/21/2017) 100 each 3     blood glucose monitoring (ONE TOUCH ULTRA MINI) meter device kit 1 kit by In Vitro route daily Use to test blood sugar 1 times daily or as directed. (Patient not taking: Reported on 9/21/2017) 1 kit 0          Review Of Systems  Constitutional:  Denies fever  GI/ negative except as noted per hpi    O:   /86 (BP Location: Left arm, Cuff Size: Adult Large)  Pulse 64  Ht 5' 1\" (1.549 m)  Wt 171 lb (77.6 kg)  SpO2 97%  BMI 32.31 kg/m2  Gen:  NAD, A and O  Vitals: /86 (BP Location: Left arm, Cuff Size: Adult Large)  Pulse 64  Ht 5' 1\" (1.549 m)  Wt 171 lb (77.6 kg)  SpO2 97%  BMI 32.31 kg/m2  BMI= Body mass index is 32.31 kg/(m^2).  Abd soft, NT, ND  Lymphadenopathy:  neg  Vulva: No lesions, erythema, BUS wnl except mild labial agglutination.  No dystrophic appearing areas.   Vagina: Pink, no lesions  Adnexa: Non-palpable, NT, no masses  Anus without lesions  Ext.  neg           A:P)      PMB-resolved with nml work-up.  F/u prn if recurs.    Vulvar LSE in remission.  Continue clobetasol every other day sparingly.  F/u 1 yr or sooner prn if problems.      "

## 2017-11-01 ENCOUNTER — OFFICE VISIT (OUTPATIENT)
Dept: FAMILY MEDICINE | Facility: OTHER | Age: 77
End: 2017-11-01
Attending: FAMILY MEDICINE
Payer: MEDICARE

## 2017-11-01 VITALS
BODY MASS INDEX: 33.63 KG/M2 | DIASTOLIC BLOOD PRESSURE: 78 MMHG | SYSTOLIC BLOOD PRESSURE: 160 MMHG | OXYGEN SATURATION: 96 % | HEART RATE: 71 BPM | TEMPERATURE: 98.4 F | WEIGHT: 178 LBS

## 2017-11-01 DIAGNOSIS — N95.1 POST MENOPAUSAL SYNDROME: ICD-10-CM

## 2017-11-01 DIAGNOSIS — E11.9 TYPE 2 DIABETES MELLITUS WITHOUT COMPLICATION, WITHOUT LONG-TERM CURRENT USE OF INSULIN (H): ICD-10-CM

## 2017-11-01 DIAGNOSIS — Z23 NEED FOR PROPHYLACTIC VACCINATION AND INOCULATION AGAINST INFLUENZA: ICD-10-CM

## 2017-11-01 DIAGNOSIS — Z78.0 ASYMPTOMATIC MENOPAUSAL STATE: ICD-10-CM

## 2017-11-01 DIAGNOSIS — E78.2 MIXED HYPERLIPIDEMIA: ICD-10-CM

## 2017-11-01 DIAGNOSIS — E78.2 MIXED HYPERLIPIDEMIA: Primary | ICD-10-CM

## 2017-11-01 DIAGNOSIS — I10 BENIGN ESSENTIAL HYPERTENSION: ICD-10-CM

## 2017-11-01 LAB
CHOLEST SERPL-MCNC: 248 MG/DL
EST. AVERAGE GLUCOSE BLD GHB EST-MCNC: 151 MG/DL
HBA1C MFR BLD: 6.9 % (ref 4.3–6)
HDLC SERPL-MCNC: 48 MG/DL
LDLC SERPL CALC-MCNC: 131 MG/DL
NONHDLC SERPL-MCNC: 200 MG/DL
TRIGL SERPL-MCNC: 346 MG/DL
TSH SERPL DL<=0.005 MIU/L-ACNC: 2.67 MU/L (ref 0.4–4)

## 2017-11-01 PROCEDURE — 99214 OFFICE O/P EST MOD 30 MIN: CPT | Performed by: FAMILY MEDICINE

## 2017-11-01 PROCEDURE — 90662 IIV NO PRSV INCREASED AG IM: CPT | Performed by: FAMILY MEDICINE

## 2017-11-01 PROCEDURE — 99212 OFFICE O/P EST SF 10 MIN: CPT | Mod: 25

## 2017-11-01 PROCEDURE — 80061 LIPID PANEL: CPT | Mod: ZL | Performed by: FAMILY MEDICINE

## 2017-11-01 PROCEDURE — 83036 HEMOGLOBIN GLYCOSYLATED A1C: CPT | Mod: ZL | Performed by: FAMILY MEDICINE

## 2017-11-01 PROCEDURE — 84443 ASSAY THYROID STIM HORMONE: CPT | Mod: ZL | Performed by: FAMILY MEDICINE

## 2017-11-01 PROCEDURE — 40000788 ZZHCL STATISTIC ESTIMATED AVERAGE GLUCOSE: Mod: ZL | Performed by: FAMILY MEDICINE

## 2017-11-01 PROCEDURE — 36415 COLL VENOUS BLD VENIPUNCTURE: CPT | Mod: ZL | Performed by: FAMILY MEDICINE

## 2017-11-01 PROCEDURE — 90471 IMMUNIZATION ADMIN: CPT | Performed by: FAMILY MEDICINE

## 2017-11-01 RX ORDER — FENOFIBRATE 145 MG/1
145 TABLET, COATED ORAL DAILY
Qty: 90 TABLET | Refills: 0 | Status: SHIPPED | OUTPATIENT
Start: 2017-11-01 | End: 2018-06-13

## 2017-11-01 ASSESSMENT — ANXIETY QUESTIONNAIRES
4. TROUBLE RELAXING: NOT AT ALL
7. FEELING AFRAID AS IF SOMETHING AWFUL MIGHT HAPPEN: NOT AT ALL
3. WORRYING TOO MUCH ABOUT DIFFERENT THINGS: NOT AT ALL
2. NOT BEING ABLE TO STOP OR CONTROL WORRYING: NOT AT ALL
6. BECOMING EASILY ANNOYED OR IRRITABLE: NOT AT ALL
1. FEELING NERVOUS, ANXIOUS, OR ON EDGE: NOT AT ALL
GAD7 TOTAL SCORE: 0
5. BEING SO RESTLESS THAT IT IS HARD TO SIT STILL: NOT AT ALL

## 2017-11-01 ASSESSMENT — PATIENT HEALTH QUESTIONNAIRE - PHQ9: SUM OF ALL RESPONSES TO PHQ QUESTIONS 1-9: 1

## 2017-11-01 ASSESSMENT — PAIN SCALES - GENERAL: PAINLEVEL: NO PAIN (0)

## 2017-11-01 NOTE — MR AVS SNAPSHOT
After Visit Summary   11/1/2017    Bel Reardon    MRN: [id number]           Patient Information     Date Of Birth          1940        Visit Information        Provider Department      11/1/2017 8:45 AM Jennie Rubio MD [facility] [place]        Today's Diagnoses     Mixed hyperlipidemia    -  1    Type 2 diabetes mellitus without complication, without long-term current use of insulin (H)        Benign essential hypertension        Need for prophylactic vaccination and inoculation against influenza        Post menopausal syndrome        Asymptomatic menopausal state            Follow-ups after your visit        Additional Services     DIABETES EDUCATION REFERRAL ([place])       DIABETES SELF-MANAGEMENT TRAINING ([facility])  Type of training and number of hours requested:  Follow Up DMST;     (Medicare will cover:10 hours initial DSMT in 12-month period, plus 2 hours follow-up DSMT annually)    Please add if the patient has special educational need: None  (Patients with special needs requiring individual DSMT)    Please include the following DMST content: Nutritional management            , Diabetes as disease process, Physical activity, Goal setting, problem solving and Prevent, detect and treat chronic complications    Patient has the following:Hypertension, Mental/affective disorder, Dyslipidemia and Obesity    Please begin Medical Nutritional Therapy.  Annual Follow Up Medical Nutitional Therapy (MNT)  ([employer] allows 3 hours initial MNT in the first calendar year, plus two hours follow up MNT annually.  Additional MNT hours are available for change in medical condition treatment and/or diagnosis)    Additional Services Provided:  >>A1c will be completed upon referral and completion of program unless completed in clinic.  >>Influenza vaccination assessment (form #N228) as applicable.  >>Order for diabetes supplies will be faxed to patient's pharmacy.  >>If on insulin: Insulin dose  adjustment per staged Diabetes Mgmt. Protocols    [facility]  [facility]  Telephone:  [phone]   Fax:  [phone]                  Your next 10 appointments already scheduled     Nov 03, 2017 10:00 AM CDT   DX HIP/PELVIS/SPINE with HIDX1   [facility] ([facility] )    [address] [facility] [address] [phone]           Please do not take any of the following 24 hours prior to the day of your exam: vitamins, calcium tablets, antacids.  If possible, please wear clothes without metal (snaps, zippers). A sweatsuit works well.            Nov 10, 2017 11:00 AM CST   (Arrive by 10:45 AM)   Return Visit with Kristal Pagan RD   [facility] ([facility] )    [address] [place] [address] [phone]            Feb 08, 2018  8:30 AM CST   (Arrive by 8:15 AM)   SHORT with Jennie Rubio MD   [facility] ([facility] )    [address] [place] [address] [phone]              Who to contact     If you have questions or need follow up information about today's clinic visit or your schedule please contact [facility] directly at [phone].  Normal or non-critical lab and imaging results will be communicated to you by MyChart, letter or phone within 4 business days after the clinic has received the results. If you do not hear from us within 7 days, please contact the clinic through MyChart or phone. If you have a critical or abnormal lab result, we will notify you by phone as soon as possible.  Submit refill requests through [employer] or call your pharmacy and they will forward the refill request to us. Please allow 3 business days for your refill to be completed.          Additional Information About Your Visit        [employer] Information     [employer] lets you send messages to your doctor, view your test results, renew your prescriptions, schedule appointments and more. To  "sign up, go to [email or web address] . Click on \"Log in\" on the left side of the screen, which will take you to the Welcome page. Then click on \"Sign up Now\" on the right side of the page.     You will be asked to enter the access code listed below, as well as some personal information. Please follow the directions to create your username and password.     Your access code is: [email or web address]  Expires: 2018  9:31 AM     Your access code will  in 90 days. If you need help or a new code, please call your [place] clinic or [phone].        Care EveryWhere ID     This is your Care EveryWhere ID. This could be used by other organizations to access your [facility] medical records  [id number]        Your Vitals Were     Pulse Temperature Pulse Oximetry BMI (Body Mass Index)          71 98.4  F (36.9  C) (Tympanic) 96% 33.63 kg/m2         Blood Pressure from Last 3 Encounters:   17 160/78   17 148/86   17 148/76    Weight from Last 3 Encounters:   17 178 lb (80.7 kg)   17 171 lb (77.6 kg)   17 178 lb 8 oz (81 kg)              We Performed the Following     DIABETES EDUCATION REFERRAL ([place])     DX Hip/Pelvis/Spine     [facility] FLU VACCINE, INCREASED ANTIGEN, PRESV FREE     Vaccine Administration, Initial [02553]          Today's Medication Changes          These changes are accurate as of: 17  9:39 AM.  If you have any questions, ask your nurse or doctor.               Start taking these medicines.        Dose/Directions    fenofibrate 145 MG tablet   Used for:  Mixed hyperlipidemia   Started by:  Jennie Rubio MD        Dose:  145 mg   Take 1 tablet (145 mg) by mouth daily   Quantity:  90 tablet   Refills:  0            Where to get your medicines      These medications were sent to [id number] - [place] - [address]  [address] [place] [address]     Phone:  [phone]     fenofibrate 145 MG tablet                Primary Care " Provider Office Phone # Fax #    Jennie Rubio [phone]       [facility] [place] [address]  [place] [address]        Equal Access to Services     SAND[facility] ESTRELLA : Hadii karin ku hadaubreyo Soomaali, waaxda luqadaha, qaybta kaalmada adeegyada, gemini saban brittany funez laPadminiasher leyva. So [facility] [phone].    ATENCIÓN: Si habla español, tiene a guaman disposición servicios gratuitos de asistencia lingüística. Llame al [phone].    We comply with applicable federal civil rights laws and [place] laws. We do not discriminate on the basis of race, color, national origin, age, disability, sex, sexual orientation, or gender identity.            Thank you!     Thank you for choosing [facility]  for your care. Our goal is always to provide you with excellent care. Hearing back from our patients is one way we can continue to improve our services. Please take a few minutes to complete the written survey that you may receive in the mail after your visit with us. Thank you!             Your Updated Medication List - Protect others around you: Learn how to safely use, store and throw away your medicines at www.disposemymeds.org.          This list is accurate as of: 11/1/17  9:39 AM.  Always use your most recent med list.                   Brand Name Dispense Instructions for use Diagnosis    allopurinol 100 MG tablet    ZYLOPRIM    135 tablet    TAKE ONE & ONE-HALF TABLETS BY MOUTH DAILY    Idiopathic gout, unspecified chronicity, unspecified site       blood glucose monitoring lancets     3 each    Use to test blood sugar 1 times daily or as directed.    Type 2 diabetes mellitus without complication, without long-term current use of insulin (H)       blood glucose monitoring meter device kit     1 kit    1 kit by In Vitro route daily Use to test blood sugar 1 times daily or as directed.    Type 2 diabetes mellitus without complication, without long-term current use of insulin (H)        fenofibrate 145 MG tablet     90 tablet    Take 1 tablet (145 mg) by mouth daily    Mixed hyperlipidemia       Fish Oil 1000 MG Cpdr      Take 1 capsule by mouth daily        hydrocortisone 2.5 % cream     30 g    Apply topically 2 times daily    Hives       levothyroxine 25 MCG tablet    SYNTHROID/LEVOTHROID    90 tablet    TAKE 1 TABLET DAILY BY MOUT H - GENERIC FOR SYNTHROID    Other specified hypothyroidism       metFORMIN 500 MG tablet    GLUCOPHAGE    60 tablet    TAKE 1 TABLET BY MOUTH WITH BREAKFAST AND 1 TABLET WITH SUPPER    Type 2 diabetes mellitus without complication, without long-term current use of insulin (H)       ONE TOUCH ULTRA test strip   Generic drug:  blood glucose monitoring     100 each    TEST BLOOD SURGARS ONCE A DAY    Type 2 diabetes mellitus without complication (H)       valsartan 80 MG tablet    DIOVAN    180 tablet    TAKE ONE TABLET TWO TIMES A DAY BY MOUTH - GENERIC FOR DIOVAN    Benign essential hypertension

## 2017-11-01 NOTE — NURSING NOTE
"Chief Complaint   Patient presents with     Diabetes       Initial /90 (BP Location: Left arm, Patient Position: Chair, Cuff Size: Adult Large)  Pulse 71  Temp 98.4  F (36.9  C) (Tympanic)  Wt 178 lb (80.7 kg)  SpO2 96%  BMI 33.63 kg/m2 Estimated body mass index is 33.63 kg/(m^2) as calculated from the following:    Height as of 9/21/17: 5' 1\" (1.549 m).    Weight as of this encounter: 178 lb (80.7 kg).  Medication Reconciliation: complete     Margarette Zayas    "

## 2017-11-02 ASSESSMENT — ANXIETY QUESTIONNAIRES: GAD7 TOTAL SCORE: 0

## 2017-11-03 ENCOUNTER — HOSPITAL ENCOUNTER (OUTPATIENT)
Dept: BONE DENSITY | Facility: HOSPITAL | Age: 77
Discharge: HOME | End: 2017-11-03
Attending: FAMILY MEDICINE | Admitting: FAMILY MEDICINE
Payer: MEDICARE

## 2017-11-03 DIAGNOSIS — Z78.0 ASYMPTOMATIC MENOPAUSAL STATE: ICD-10-CM

## 2017-11-03 DIAGNOSIS — N95.1 POST MENOPAUSAL SYNDROME: ICD-10-CM

## 2017-11-03 PROCEDURE — 77080 DXA BONE DENSITY AXIAL: CPT | Mod: TC

## 2017-11-10 ENCOUNTER — HOSPITAL ENCOUNTER (OUTPATIENT)
Dept: EDUCATION SERVICES | Facility: HOSPITAL | Age: 77
Discharge: HOME | End: 2017-11-10
Attending: FAMILY MEDICINE | Admitting: FAMILY MEDICINE
Payer: MEDICARE

## 2017-11-10 VITALS
BODY MASS INDEX: 33.46 KG/M2 | DIASTOLIC BLOOD PRESSURE: 86 MMHG | WEIGHT: 177.2 LBS | SYSTOLIC BLOOD PRESSURE: 138 MMHG | OXYGEN SATURATION: 98 % | HEIGHT: 61 IN | HEART RATE: 59 BPM

## 2017-11-10 DIAGNOSIS — E11.9 TYPE 2 DIABETES MELLITUS WITHOUT COMPLICATION, WITHOUT LONG-TERM CURRENT USE OF INSULIN (H): Primary | ICD-10-CM

## 2017-11-10 PROCEDURE — 97802 MEDICAL NUTRITION INDIV IN: CPT | Performed by: DIETITIAN, REGISTERED

## 2017-11-10 ASSESSMENT — PAIN SCALES - GENERAL: PAINLEVEL: NO PAIN (0)

## 2017-11-10 NOTE — IP AVS SNAPSHOT
MRN:[id number]                      After Visit Summary   11/10/2017    Bel Reardon    MRN: [id number]           Thank you!     Thank you for choosing [facility] for your care. Our goal is always to provide you with excellent care. Hearing back from our patients is one way we can continue to improve our services. Please take a few minutes to complete the written survey that you may receive in the mail after you visit with us. Thank you!        Patient Information     Date Of Birth          1940        About your hospital stay     You were admitted on:  November 10, 2017 You last received care in the:  [facility]    You were discharged on:  November 10, 2017       Who to Call     For medical emergencies, please call 911.  For non-urgent questions about your medical care, please call your primary care provider or clinic, [phone]          Attending Provider     Provider Specialty    Jennie Rubio MD Family Practice       Primary Care Provider Office Phone # Fax #    Jennie Rubio [phone]      Your next 10 appointments already scheduled     Feb 08, 2018  8:30 AM CST   (Arrive by 8:15 AM)   SHORT with Jennie Rubio MD   [facility] [place] ([facility] [place] )    [address]  [place] [id number] [phone]              Further instructions from your care team       -Continue to limit carbohydrates in your diet.  Keep limiting your sweets.    -Exercise goal is 30 minutes most days of the week.   -Test your glucose fasting daily and 3-4x/week 2 hours after supper.   -Target levels are fasting , 2 hours after supper less than 180.    -Last A1c was 6.9% (11-1-17) - goal is to keep it less than 7.0%.    -Weight today was 177#.  Optimal weight loss is 1# per week.   -Follow up annually or sooner if needed.   -Call with any concerns - LARY Peters, [phone].      Pending Results     No orders found  "from 2017 to 2017.            Admission Information     Date & Time Provider Department Dept. Phone    11/10/2017 Jennie Rubio MD [facility] [phone]      Your Vitals Were     Pulse Height Weight Pulse Oximetry BMI (Body Mass Index)       59 1.549 m (5' 1\") 80.4 kg (177 lb 3.2 oz) 98% 33.48 kg/m2       MyChart Information     [employer] lets you send messages to your doctor, view your test results, renew your prescriptions, schedule appointments and more. To sign up, go to [email or web address] . Click on \"Log in\" on the left side of the screen, which will take you to the Welcome page. Then click on \"Sign up Now\" on the right side of the page.     You will be asked to enter the access code listed below, as well as some personal information. Please follow the directions to create your username and password.     Your access code is: [email or web address]  Expires: 2018  8:31 AM     Your access code will  in 90 days. If you need help or a new code, please call your [facility] clinic or [phone].        Care EveryWhere ID     This is your Care EveryWhere ID. This could be used by other organizations to access your [facility] medical records  [id number]        Equal Access to Services     SAVANNA ESTRELLA AH: Hadii karin schultz hadasho Socelsoali, waaxda luqadaha, qaybta kaalmada jesicada, gemini leyva. So [facility] [phone].    ATENCIÓN: Si habla español, tiene a guaman disposición servicios gratuitos de asistencia lingüística. Ruben al [phone].    We comply with applicable federal civil rights laws and [place] laws. We do not discriminate on the basis of race, color, national origin, age, disability, sex, sexual orientation, or gender identity.               Review of your medicines      UNREVIEWED medicines. Ask your doctor about these medicines        Dose / Directions    allopurinol 100 MG tablet   Commonly known as:  ZYLOPRIM   Used for:  Idiopathic gout, " unspecified chronicity, unspecified site        TAKE ONE & ONE-HALF TABLETS BY MOUTH DAILY   Quantity:  135 tablet   Refills:  1       fenofibrate 145 MG tablet   Used for:  Mixed hyperlipidemia        Dose:  145 mg   Take 1 tablet (145 mg) by mouth daily   Quantity:  90 tablet   Refills:  0       Fish Oil 1000 MG Cpdr        Dose:  1 capsule   Take 1 capsule by mouth daily   Refills:  0       hydrocortisone 2.5 % cream   Used for:  Hives        Apply topically 2 times daily   Quantity:  30 g   Refills:  0       levothyroxine 25 MCG tablet   Commonly known as:  SYNTHROID/LEVOTHROID   Used for:  Other specified hypothyroidism        TAKE 1 TABLET DAILY BY MOUT H - GENERIC FOR SYNTHROID   Quantity:  90 tablet   Refills:  3       metFORMIN 500 MG tablet   Commonly known as:  GLUCOPHAGE   Used for:  Type 2 diabetes mellitus without complication, without long-term current use of insulin (H)        TAKE 1 TABLET BY MOUTH WITH BREAKFAST AND 1 TABLET WITH SUPPER   Quantity:  60 tablet   Refills:  4       valsartan 80 MG tablet   Commonly known as:  DIOVAN   Used for:  Benign essential hypertension        TAKE ONE TABLET TWO TIMES A DAY BY MOUTH - GENERIC FOR DIOVAN   Quantity:  180 tablet   Refills:  1         CONTINUE these medicines which have NOT CHANGED        Dose / Directions    blood glucose monitoring lancets   Used for:  Type 2 diabetes mellitus without complication, without long-term current use of insulin (H)        Use to test blood sugar 1 times daily or as directed.   Quantity:  3 each   Refills:  11       blood glucose monitoring meter device kit   Used for:  Type 2 diabetes mellitus without complication, without long-term current use of insulin (H)        Dose:  1 kit   1 kit by In Vitro route daily Use to test blood sugar 1 times daily or as directed.   Quantity:  1 kit   Refills:  0       ONETOUCH ULTRA test strip   Used for:  Type 2 diabetes mellitus without complication (H)   Generic drug:  blood glucose  monitoring        TEST BLOOD SURGARS ONCE A DAY   Quantity:  100 each   Refills:  3                Protect others around you: Learn how to safely use, store and throw away your medicines at www.disposemymeds.org.             Medication List: This is a list of all your medications and when to take them. Check marks below indicate your daily home schedule. Keep this list as a reference.      Medications           Morning Afternoon Evening Bedtime As Needed    allopurinol 100 MG tablet   Commonly known as:  ZYLOPRIM   TAKE ONE & ONE-HALF TABLETS BY MOUTH DAILY                                blood glucose monitoring lancets   Use to test blood sugar 1 times daily or as directed.                                blood glucose monitoring meter device kit   1 kit by In Vitro route daily Use to test blood sugar 1 times daily or as directed.                                fenofibrate 145 MG tablet   Take 1 tablet (145 mg) by mouth daily                                Fish Oil 1000 MG Cpdr   Take 1 capsule by mouth daily                                hydrocortisone 2.5 % cream   Apply topically 2 times daily                                levothyroxine 25 MCG tablet   Commonly known as:  SYNTHROID/LEVOTHROID   TAKE 1 TABLET DAILY BY MOUT H - GENERIC FOR SYNTHROID                                metFORMIN 500 MG tablet   Commonly known as:  GLUCOPHAGE   TAKE 1 TABLET BY MOUTH WITH BREAKFAST AND 1 TABLET WITH SUPPER                                ONETOUCH ULTRA test strip   TEST BLOOD SURGARS ONCE A DAY   Generic drug:  blood glucose monitoring                                valsartan 80 MG tablet   Commonly known as:  DIOVAN   TAKE ONE TABLET TWO TIMES A DAY BY MOUTH - GENERIC FOR DIOVAN

## 2017-11-10 NOTE — DISCHARGE INSTRUCTIONS
-Continue to limit carbohydrates in your diet.  Keep limiting your sweets.    -Exercise goal is 30 minutes most days of the week.   -Test your glucose fasting daily and 3-4x/week 2 hours after supper.   -Target levels are fasting , 2 hours after supper less than 180.    -Last A1c was 6.9% (11-1-17) - goal is to keep it less than 7.0%.    -Weight today was 177#.  Optimal weight loss is 1# per week.   -Follow up annually or sooner if needed.   -Call with any concerns - LARY Peters, [phone].

## 2017-11-10 NOTE — IP AVS SNAPSHOT
[facility]    [address] [place] [address]    Phone:  [phone]    Fax:  [phone]                                       After Visit Summary   11/10/2017    Bel Reardon    MRN: [id number]           After Visit Summary Signature Page     I have received my discharge instructions, and my questions have been answered. I have discussed any challenges I see with this plan with the nurse or doctor.    ..........................................................................................................................................  Patient/Patient Representative Signature      ..........................................................................................................................................  Patient Representative Print Name and Relationship to Patient    ..................................................               ................................................  Date                                            Time    ..........................................................................................................................................  Reviewed by Signature/Title    ...................................................              ..............................................  Date                                                            Time

## 2017-11-10 NOTE — PROGRESS NOTES
"Patient here for annual review.     BG readings as follows: Pt only tests fasting - 128-170 with one at 192.     Lab Results   Component Value Date    A1C 6.9 11/01/2017    A1C 6.7 06/28/2017    A1C 6.5 03/20/2017    A1C 6.7 12/14/2016    A1C 6.8 09/15/2016     /86  Pulse 59  Ht 1.549 m (5' 1\")  Wt 80.4 kg (177 lb 3.2 oz)  SpO2 98%  BMI 33.48 kg/m2    Current diabetes medications: Metformin 500 mg bid.     Readiness to learn: willing.     Barriers to learning: none.     Patient actively participated and demonstrated adequate understanding of topics discussed.  Discussed upward trend of A1c and target of less than 7.0%.  Pt states that in the past two weeks she has cut sugar out of her diet.  She has also been walking daily for 35-45 minutes at the mall for the past month.  She plans to continue during the winter months and is enjoying it.  Pt would like to lose some weight.  Review of food log notes she eats three meals per day with small snacks between.  Portions of carbohydrates do not seem excessive.      Foot exam 3/2017.  Eye exam is scheduled for December.  She does not smoke.  Pt states she cannot tolerate statin medications - diarrhea and muscle aches.     Topics covered: reviewed blood glucose/A1c targets, testing times, problem solving techniques, discussed risks and reduction of complications related to diabetes and co-morbidities, reviewed diabetes check-list, foot care, sick day management, stress & depression risks and management, managing a chronic disease, quality check of meter, review medications, meal planning, benefits of exercise and importance of diabetes self-management.     Meter accuracy checked.      Goal review: Keep A1c below 7.0%.     Plan: Limit carbohydrates in diet and continue efforts to eat less sweets/sugar.  Continue walking for exercise with goal of 30 minutes daily.  Test glucose fasting and 3-4x/week 2 hours post supper.  A1c every three months.     Follow up: " annually and prn.     Time spent with patient: 45 minutes.     LARY Peters, CDE

## 2017-11-10 NOTE — NURSING NOTE
"Chief Complaint   Patient presents with     Diabetes     annual       Initial Pulse 59  Ht 1.549 m (5' 1\")  Wt 80.4 kg (177 lb 3.2 oz)  SpO2 98%  BMI 33.48 kg/m2 Estimated body mass index is 33.48 kg/(m^2) as calculated from the following:    Height as of this encounter: 1.549 m (5' 1\").    Weight as of this encounter: 80.4 kg (177 lb 3.2 oz).  Medication Reconciliation: complete   Bailee Olivares      "

## 2017-12-04 DIAGNOSIS — I10 BENIGN ESSENTIAL HYPERTENSION: ICD-10-CM

## 2017-12-04 NOTE — TELEPHONE ENCOUNTER
[facility]      Last Written Prescription Date: 6/8/17  Last Fill Quantity: 180,  # refills: 1   Last Office Visit with [facility], [facility] or [facility] prescribing provider: 11/1/17                                         Next 5 appointments (look out 90 days)     Feb 08, 2018  8:30 AM CST   (Arrive by 8:15 AM)   SHORT with Jennie Rubio MD   [facility] [place] ([facility] [place] [facility] )    [address] [place] [address]   [phone]

## 2017-12-06 RX ORDER — VALSARTAN 80 MG/1
TABLET ORAL
Qty: 180 TABLET | Refills: 3 | Status: SHIPPED | OUTPATIENT
Start: 2017-12-06 | End: 2018-05-10

## 2017-12-07 DIAGNOSIS — E03.8 OTHER SPECIFIED HYPOTHYROIDISM: ICD-10-CM

## 2017-12-07 NOTE — TELEPHONE ENCOUNTER
Levothyroxine      Last Written Prescription Date: 1/10/17  Last Fill Quantity: 90,  # refills: 3   Last Office Visit with [facility], [facility] or [facility] prescribing provider: 11/1/17                                         Next 5 appointments (look out 90 days)     Feb 08, 2018  8:30 AM CST   (Arrive by 8:15 AM)   SHORT with Jennie Rubio MD   [facility] ([facility] )    [address] [place] [address]   [phone]

## 2017-12-08 RX ORDER — LEVOTHYROXINE SODIUM 25 UG/1
TABLET ORAL
Qty: 90 TABLET | Refills: 3 | Status: SHIPPED | OUTPATIENT
Start: 2017-12-08 | End: 2018-11-29

## 2018-01-04 ENCOUNTER — TRANSFERRED RECORDS (OUTPATIENT)
Dept: HEALTH INFORMATION MANAGEMENT | Facility: HOSPITAL | Age: 78
End: 2018-01-04

## 2018-01-08 ENCOUNTER — TELEPHONE (OUTPATIENT)
Dept: FAMILY MEDICINE | Facility: OTHER | Age: 78
End: 2018-01-08

## 2018-02-15 ENCOUNTER — OFFICE VISIT (OUTPATIENT)
Dept: FAMILY MEDICINE | Facility: OTHER | Age: 78
End: 2018-02-15
Attending: FAMILY MEDICINE
Payer: COMMERCIAL

## 2018-02-15 VITALS
TEMPERATURE: 96.9 F | RESPIRATION RATE: 18 BRPM | HEIGHT: 60 IN | WEIGHT: 176.6 LBS | HEART RATE: 58 BPM | BODY MASS INDEX: 34.67 KG/M2 | DIASTOLIC BLOOD PRESSURE: 78 MMHG | OXYGEN SATURATION: 98 % | SYSTOLIC BLOOD PRESSURE: 130 MMHG

## 2018-02-15 DIAGNOSIS — I10 BENIGN ESSENTIAL HYPERTENSION: ICD-10-CM

## 2018-02-15 DIAGNOSIS — E11.9 TYPE 2 DIABETES MELLITUS WITHOUT COMPLICATION, WITHOUT LONG-TERM CURRENT USE OF INSULIN (H): ICD-10-CM

## 2018-02-15 DIAGNOSIS — E78.2 MIXED HYPERLIPIDEMIA: ICD-10-CM

## 2018-02-15 DIAGNOSIS — E11.9 TYPE 2 DIABETES MELLITUS WITHOUT COMPLICATION, WITHOUT LONG-TERM CURRENT USE OF INSULIN (H): Primary | ICD-10-CM

## 2018-02-15 LAB
CREAT UR-MCNC: 111 MG/DL
EST. AVERAGE GLUCOSE BLD GHB EST-MCNC: 148 MG/DL
HBA1C MFR BLD: 6.8 % (ref 4.3–6)
MICROALBUMIN UR-MCNC: 32 MG/L
MICROALBUMIN/CREAT UR: 28.56 MG/G CR (ref 0–25)

## 2018-02-15 PROCEDURE — 40000788 ZZHCL STATISTIC ESTIMATED AVERAGE GLUCOSE: Mod: ZL | Performed by: FAMILY MEDICINE

## 2018-02-15 PROCEDURE — 36415 COLL VENOUS BLD VENIPUNCTURE: CPT | Mod: ZL | Performed by: FAMILY MEDICINE

## 2018-02-15 PROCEDURE — 99214 OFFICE O/P EST MOD 30 MIN: CPT | Performed by: FAMILY MEDICINE

## 2018-02-15 PROCEDURE — 83036 HEMOGLOBIN GLYCOSYLATED A1C: CPT | Mod: ZL | Performed by: FAMILY MEDICINE

## 2018-02-15 PROCEDURE — 82043 UR ALBUMIN QUANTITATIVE: CPT | Mod: ZL | Performed by: FAMILY MEDICINE

## 2018-02-15 PROCEDURE — G0463 HOSPITAL OUTPT CLINIC VISIT: HCPCS

## 2018-02-15 ASSESSMENT — PATIENT HEALTH QUESTIONNAIRE - PHQ9: 5. POOR APPETITE OR OVEREATING: NOT AT ALL

## 2018-02-15 ASSESSMENT — PAIN SCALES - GENERAL: PAINLEVEL: NO PAIN (0)

## 2018-02-15 ASSESSMENT — ANXIETY QUESTIONNAIRES
1. FEELING NERVOUS, ANXIOUS, OR ON EDGE: NOT AT ALL
GAD7 TOTAL SCORE: 0
2. NOT BEING ABLE TO STOP OR CONTROL WORRYING: NOT AT ALL
IF YOU CHECKED OFF ANY PROBLEMS ON THIS QUESTIONNAIRE, HOW DIFFICULT HAVE THESE PROBLEMS MADE IT FOR YOU TO DO YOUR WORK, TAKE CARE OF THINGS AT HOME, OR GET ALONG WITH OTHER PEOPLE: NOT DIFFICULT AT ALL
3. WORRYING TOO MUCH ABOUT DIFFERENT THINGS: NOT AT ALL
7. FEELING AFRAID AS IF SOMETHING AWFUL MIGHT HAPPEN: NOT AT ALL
6. BECOMING EASILY ANNOYED OR IRRITABLE: NOT AT ALL
5. BEING SO RESTLESS THAT IT IS HARD TO SIT STILL: NOT AT ALL

## 2018-02-15 NOTE — MR AVS SNAPSHOT
After Visit Summary   2/15/2018    Bel Reardon    MRN: [id number]           Patient Information     Date Of Birth          1940        Visit Information        Provider Department      2/15/2018 8:45 AM Jennie Rubio MD [facility]        Today's Diagnoses     Type 2 diabetes mellitus without complication, without long-term current use of insulin (H)    -  1    Mixed hyperlipidemia        Benign essential hypertension           Follow-ups after your visit        Your next 10 appointments already scheduled     Jun 13, 2018  8:30 AM CDT   (Arrive by 8:15 AM)   SHORT with Jennie Rubio MD   [facility] ([facility] )    [address] [address] [phone]            Nov 12, 2018 10:30 AM CST   (Arrive by 10:15 AM)   Return Visit with Kristal Pagan RD   [facility] ([facility] )    [address] [place] [address] [phone]              Future tests that were ordered for you today     Open Future Orders        Priority Expected Expires Ordered    Comprehensive metabolic panel Routine 6/4/2018 2/15/2019 2/15/2018    Lipid Profile (Chol, Trig, HDL, LDL calc) Routine 6/4/2018 2/15/2019 2/15/2018            Who to contact     If you have questions or need follow up information about today's clinic visit or your schedule please contact [facility] directly at [phone].  Normal or non-critical lab and imaging results will be communicated to you by MyChart, letter or phone within 4 business days after the clinic has received the results. If you do not hear from us within 7 days, please contact the clinic through MyChart or phone. If you have a critical or abnormal lab result, we will notify you by phone as soon as possible.  Submit refill requests through [employer] or call your pharmacy and they will forward the refill request to us. Please allow 3 business days for your refill  "to be completed.          Additional Information About Your Visit        [employer] Information     [employer] lets you send messages to your doctor, view your test results, renew your prescriptions, schedule appointments and more. To sign up, go to [email or web address] . Click on \"Log in\" on the left side of the screen, which will take you to the Welcome page. Then click on \"Sign up Now\" on the right side of the page.     You will be asked to enter the access code listed below, as well as some personal information. Please follow the directions to create your username and password.     Your access code is: [email or web address]  Expires: 2018  8:58 AM     Your access code will  in 90 days. If you need help or a new code, please call your [facility] clinic or [phone].        Care EveryWhere ID     This is your [facility] EveryWhere ID. This could be used by other organizations to access your [facility] medical records  [id number]        Your Vitals Were     Pulse Temperature Respirations Height Pulse Oximetry BMI (Body Mass Index)    58 96.9  F (36.1  C) (Tympanic) 18 5' (1.524 m) 98% 34.49 kg/m2       Blood Pressure from Last 3 Encounters:   02/15/18 130/78   11/10/17 138/86   17 160/78    Weight from Last 3 Encounters:   02/15/18 176 lb 9.6 oz (80.1 kg)   11/10/17 177 lb 3.2 oz (80.4 kg)   17 178 lb (80.7 kg)               Primary Care Provider Office Phone # Fax #    Jennie Rubio [phone]       [facility] [place] [address]  [place] [address]        Equal Access to Services     [facility] DELORES AH: Hadkathie Costa, wayarelida ludarynadaha, qakarlata kaalmada leeroy, gemini leyva. So [facility] [phone].    ATENCIÓN: Si habla español, tiene a guaman disposición servicios gratuitos de asistencia lingüística. Ruben al [phone].    We comply with applicable federal civil rights laws and [place] laws. We do not discriminate on the basis of race, " color, national origin, age, disability, sex, sexual orientation, or gender identity.            Thank you!     [facility]  [facility]. Our goal is always to provide you with excellent care. Hearing back from our patients is one way we can continue to improve our services. Please take a few minutes to complete the written survey that you may receive in the mail after your visit with us. Thank you!             Your Updated Medication List - Protect others around you: Learn how to safely use, store and throw away your medicines at www.disposemymeds.org.          This list is accurate as of 2/15/18  9:04 AM.  Always use your most recent med list.                   Brand Name Dispense Instructions for use Diagnosis    allopurinol 100 MG tablet    ZYLOPRIM    135 tablet    TAKE ONE & ONE-HALF TABLETS BY MOUTH DAILY    Idiopathic gout, unspecified chronicity, unspecified site       blood glucose monitoring lancets     3 each    Use to test blood sugar 1 times daily or as directed.    Type 2 diabetes mellitus without complication, without long-term current use of insulin (H)       blood glucose monitoring meter device kit     1 kit    1 kit by In Vitro route daily Use to test blood sugar 1 times daily or as directed.    Type 2 diabetes mellitus without complication, without long-term current use of insulin (H)       fenofibrate 145 MG tablet     90 tablet    Take 1 tablet (145 mg) by mouth daily    Mixed hyperlipidemia       Fish Oil 1000 MG Cpdr      Take 1 capsule by mouth daily        hydrocortisone 2.5 % cream     30 g    Apply topically 2 times daily    Hives       levothyroxine 25 MCG tablet    SYNTHROID/LEVOTHROID    90 tablet    TAKE 1 TABLET BY MOUTH EVERY DAY    Other specified hypothyroidism       metFORMIN 500 MG tablet    GLUCOPHAGE    60 tablet    TAKE 1 TABLET BY MOUTH WITH BREAKFAST AND 1 TABLET WITH SUPPER    Type 2 diabetes mellitus without complication, without  long-term current use of insulin (H)       ONETOUCH ULTRA test strip   Generic drug:  blood glucose monitoring     100 each    TEST BLOOD SURGARS ONCE A DAY    Type 2 diabetes mellitus without complication (H)       valsartan 80 MG tablet    DIOVAN    180 tablet    TAKE ONE TABLET TWO TIMES A DAY BY MOUTH - GENERIC FOR DIOVAN    Benign essential hypertension

## 2018-02-15 NOTE — NURSING NOTE
Chief Complaint   Patient presents with     Diabetes       Initial /78 (BP Location: Right arm, Patient Position: Sitting, Cuff Size: Adult Regular)  Pulse 58  Temp 96.9  F (36.1  C) (Tympanic)  Resp 18  Ht 5' (1.524 m)  Wt 176 lb 9.6 oz (80.1 kg)  SpO2 98%  BMI 34.49 kg/m2 Estimated body mass index is 34.49 kg/(m^2) as calculated from the following:    Height as of this encounter: 5' (1.524 m).    Weight as of this encounter: 176 lb 9.6 oz (80.1 kg).  Medication Reconciliation: complete   Louise Vega MA

## 2018-02-15 NOTE — PROGRESS NOTES
SUBJECTIVE:                                                    Bel Reardon is a 78 year old female who presents to clinic today for the following health issues:      Diabetes Follow-up    Patient is checking blood sugars: once daily.  Results are as follows:         am - 100-130    Diabetic concerns: None     Symptoms of hypoglycemia (low blood sugar): none     Paresthesias (numbness or burning in feet) or sores: No     Date of last diabetic eye exam: 12/17    Hyperlipidemia Follow-Up      Rate your low fat/cholesterol diet?: good    Taking statin?  No    Other lipid medications/supplements?:  Fish oil/Omega 3, dose 1000mg without side effects    Hypertension Follow-up      Outpatient blood pressures are not being checked.    Low Salt Diet: no added salt    BP Readings from Last 2 Encounters:   02/15/18 130/78   11/10/17 138/86     Hemoglobin A1C (%)   Date Value   11/01/2017 6.9 (H)   06/28/2017 6.7 (H)     LDL Cholesterol Calculated (mg/dL)   Date Value   11/01/2017 131 (H)   03/20/2017 161 (H)       Amount of exercise or physical activity: 4-5 days/week for an average of greater than 60 minutes    Problems taking medications regularly: No    Medication side effects: none    Diet: diabetic      Problem list and histories reviewed & adjusted, as indicated.  Additional history: as documented    Patient Active Problem List   Diagnosis     Advance care planning     Mixed hyperlipidemia     Postmenopausal bleeding     Type 2 diabetes mellitus without complication, without long-term current use of insulin (H)     Benign essential hypertension     Chronic right-sided low back pain with right-sided sciatica     Combined forms of age-related cataract     Hives     Past Surgical History:   Procedure Laterality Date     COLONOSCOPY N/A 8/21/2015    no further scopes needed.  Surgeon: Yohan Licona DO;  Location: [facility]     DILATION AND CURETTAGE, OPERATIVE HYSTEROSCOPY, COMBINED N/A 1/13/2016    Procedure:  COMBINED DILATION AND CURETTAGE, OPERATIVE HYSTEROSCOPY;  Surgeon: Seven Kern MD;  Location: [facility]     left shoulder  1990    nerve damage  s/p trauma; from repetitious work     lumpectomy left breast  1990    benign -- fibroadenoma-left; Facility; [facility]     PHACOEMULSIFICATION WITH STANDARD INTRAOCULAR LENS IMPLANT Left 11/11/2014    Procedure: PHACOEMULSIFICATION WITH STANDARD INTRAOCULAR LENS IMPLANT;  Surgeon: Bobby Arambula MD;  Location: [facility]       Social History   Substance Use Topics     Smoking status: Former Smoker     Years: 14.00     Types: Cigarettes     Smokeless tobacco: Never Used      Comment: 20.00 per day; quit 1991     Alcohol use 0.0 oz/week     0 Standard drinks or equivalent per week      Comment: 1 beer weekly     Family History   Problem Relation Age of Onset     C.A.D. Father      Hypertension Father      Other - See Comments Father 78     hyperlipidemia/MI; cause of death     C.A.D. Mother      s/p AMI, pacemaker     DIABETES Mother 95     Hypertension Mother      Other - See Comments Mother      hyperlipidemia/hypothyroid     CANCER Brother 72     lip; cause of death     CANCER Sister 65     lung     Coronary Artery Disease Brother      gallbladder     Other - See Comments Brother 50     AMI x2     Other - See Comments Sister      hyperlipidemia     Hypertension Sister      Other - See Comments Maternal Grandmother      old age     Unknown/Adopted Maternal Grandfather      Other - See Comments Paternal Grandmother 75     old age     Unknown/Adopted Paternal Grandfather          Current Outpatient Prescriptions   Medication Sig Dispense Refill     metFORMIN (GLUCOPHAGE) 500 MG tablet TAKE 1 TABLET BY MOUTH WITH BREAKFAST AND 1 TABLET WITH SUPPER 60 tablet 2     levothyroxine (SYNTHROID/LEVOTHROID) 25 MCG tablet TAKE 1 TABLET BY MOUTH EVERY DAY 90 tablet 3     valsartan (DIOVAN) 80 MG tablet TAKE ONE TABLET TWO TIMES A DAY BY MOUTH - GENERIC FOR DIOVAN 180 tablet 3     hydrocortisone  2.5 % cream Apply topically 2 times daily 30 g 0     blood glucose monitoring (ONE TOUCH DELICA) lancets Use to test blood sugar 1 times daily or as directed. 3 each 11     allopurinol (ZYLOPRIM) 100 MG tablet TAKE ONE & ONE-HALF TABLETS BY MOUTH DAILY 135 tablet 1     ONE TOUCH ULTRA test strip TEST BLOOD SURGARS ONCE A  each 3     Omega-3 Fatty Acids (FISH OIL) 1000 MG CPDR Take 1 capsule by mouth daily       blood glucose monitoring (ONE TOUCH ULTRA MINI) meter device kit 1 kit by In Vitro route daily Use to test blood sugar 1 times daily or as directed. 1 kit 0     fenofibrate 145 MG tablet Take 1 tablet (145 mg) by mouth daily (Patient not taking: Reported on 2/15/2018) 90 tablet 0     Allergies   Allergen Reactions     Aspirin      Tightness in chest       Erythromycin Base [Kdc:Yellow Dye+Erythromycin+Brilliant Blue Fcf]      Ezetimibe Other (See Comments)     Aches; Zetia       Fenofibrate Micronized [Fenofibrate]      Aches; Tricor     Levofloxacin      Flu-like symptoms.     Lisinopril Other (See Comments)     headache     No Clinical Screening - See Comments      Fenofibrate nanocrystallized; Aches- Tricor     Pravastatin Sodium Other (See Comments)     Myalgia; Pravachol     Dexamethasone Rash     Maxidex     Dexamethasone Sod Phosphate [Dexamethasone Sodium Phosphate] Rash     Maxidex     Niacin Rash     Rosuvastatin Calcium Rash     crestor       BP Readings from Last 3 Encounters:   02/15/18 130/78   11/10/17 138/86   11/01/17 160/78    Wt Readings from Last 3 Encounters:   02/15/18 176 lb 9.6 oz (80.1 kg)   11/10/17 177 lb 3.2 oz (80.4 kg)   11/01/17 178 lb (80.7 kg)                    ROS:  Constitutional, HEENT, cardiovascular, pulmonary, gi and gu systems are negative, except as otherwise noted.    OBJECTIVE:                                                    /78 (BP Location: Right arm, Patient Position: Sitting, Cuff Size: Adult Regular)  Pulse 58  Temp 96.9  F (36.1  C)  (Tympanic)  Resp 18  Ht 5' (1.524 m)  Wt 176 lb 9.6 oz (80.1 kg)  SpO2 98%  BMI 34.49 kg/m2  Body mass index is 34.49 kg/(m^2).  GENERAL APPEARANCE: healthy, alert and no distress  RESP: lungs clear to auscultation - no rales, rhonchi or wheezes  CV: regular rates and rhythm, normal S1 S2, no S3 or S4 and no murmur, click or rub  ABDOMEN: soft, nontender, without hepatosplenomegaly or masses, bowel sounds normal and obese  PSYCH: mentation appears normal and affect normal/bright       ASSESSMENT/PLAN:                                                    1. Type 2 diabetes mellitus without complication, without long-term current use of insulin (H)  F/u 4 mos   - Albumin Random Urine Quantitative with Creat Ratio; Future    2. Mixed hyperlipidemia  She stopped taking her fenofibrate second to stomach upset  F/u 4 mos come fasting    3. Benign essential hypertension  stable      See Patient Instructions  Jennie Rubio MD  [facility]

## 2018-02-16 ASSESSMENT — PATIENT HEALTH QUESTIONNAIRE - PHQ9: SUM OF ALL RESPONSES TO PHQ QUESTIONS 1-9: 0

## 2018-02-16 ASSESSMENT — ANXIETY QUESTIONNAIRES: GAD7 TOTAL SCORE: 0

## 2018-03-24 DIAGNOSIS — M10.00 IDIOPATHIC GOUT, UNSPECIFIED CHRONICITY, UNSPECIFIED SITE: ICD-10-CM

## 2018-03-26 NOTE — TELEPHONE ENCOUNTER
Zyloprim      Last Written Prescription Date:  6/30/2018  Last Fill Quantity: 135,   # refills: 1  Last Office Visit: 2/15/2018  Future Office visit:    Next 5 appointments (look out 90 days)     Jun 13, 2018  8:30 AM CDT   (Arrive by 8:15 AM)   SHORT with Jennie Rubio MD   [facility] ([facility] )    [address] [place] [address]   [phone]

## 2018-03-27 RX ORDER — ALLOPURINOL 100 MG/1
TABLET ORAL
Qty: 135 TABLET | OUTPATIENT
Start: 2018-03-27

## 2018-03-27 RX ORDER — ALLOPURINOL 100 MG/1
TABLET ORAL
Qty: 135 TABLET | Refills: 1 | Status: SHIPPED | OUTPATIENT
Start: 2018-03-27 | End: 2018-08-07

## 2018-03-27 NOTE — TELEPHONE ENCOUNTER
Please advise on Allopurinol.  Patient due for labs.  Thank you.     Uric acid greater than or equal to 6 on file in past 12 months           Recent Labs   Lab Test  04/21/13   1155   URIC  6.5*   If level is 6mg/dL or greater, ok to refill one time and refer to provider.                 CBC on file in past 12 months           Recent Labs   Lab Test  08/20/17   1132   WBC  5.2   RBC  4.95   HGB  15.3   HCT  43.7   PLT  192                  ALT on file in past 12 months           Recent Labs   Lab Test  08/20/17   1132   ALT  69*

## 2018-03-29 ENCOUNTER — TRANSFERRED RECORDS (OUTPATIENT)
Dept: HEALTH INFORMATION MANAGEMENT | Facility: HOSPITAL | Age: 78
End: 2018-03-29

## 2018-03-30 ENCOUNTER — TRANSFERRED RECORDS (OUTPATIENT)
Dept: HEALTH INFORMATION MANAGEMENT | Facility: CLINIC | Age: 78
End: 2018-03-30

## 2018-04-03 ENCOUNTER — TELEPHONE (OUTPATIENT)
Dept: FAMILY MEDICINE | Facility: OTHER | Age: 78
End: 2018-04-03

## 2018-04-03 ENCOUNTER — HOSPITAL ENCOUNTER (EMERGENCY)
Facility: HOSPITAL | Age: 78
Discharge: HOME | End: 2018-04-03
Attending: PHYSICIAN ASSISTANT | Admitting: PHYSICIAN ASSISTANT
Payer: MEDICARE

## 2018-04-03 VITALS
DIASTOLIC BLOOD PRESSURE: 111 MMHG | OXYGEN SATURATION: 94 % | RESPIRATION RATE: 18 BRPM | HEART RATE: 99 BPM | WEIGHT: 170 LBS | BODY MASS INDEX: 33.2 KG/M2 | TEMPERATURE: 98.4 F | SYSTOLIC BLOOD PRESSURE: 201 MMHG

## 2018-04-03 DIAGNOSIS — F41.0 ANXIETY ATTACK: ICD-10-CM

## 2018-04-03 DIAGNOSIS — Z87.39 HISTORY OF BACK PAIN: ICD-10-CM

## 2018-04-03 DIAGNOSIS — R03.0 ELEVATED BLOOD PRESSURE READING: ICD-10-CM

## 2018-04-03 DIAGNOSIS — J20.9 ACUTE BRONCHITIS, UNSPECIFIED ORGANISM: ICD-10-CM

## 2018-04-03 PROCEDURE — 99213 OFFICE O/P EST LOW 20 MIN: CPT | Performed by: PHYSICIAN ASSISTANT

## 2018-04-03 PROCEDURE — G0463 HOSPITAL OUTPT CLINIC VISIT: HCPCS

## 2018-04-03 RX ORDER — SULFAMETHOXAZOLE/TRIMETHOPRIM 800-160 MG
1 TABLET ORAL 2 TIMES DAILY
Qty: 20 TABLET | Refills: 0 | Status: SHIPPED | OUTPATIENT
Start: 2018-04-03 | End: 2018-04-13

## 2018-04-03 ASSESSMENT — ENCOUNTER SYMPTOMS
CONSTITUTIONAL NEGATIVE: 1
NAUSEA: 0
FATIGUE: 0
ABDOMINAL PAIN: 0
VOICE CHANGE: 0
DIARRHEA: 0
EYE REDNESS: 0
VOMITING: 0
FEVER: 0
NERVOUS/ANXIOUS: 1
BACK PAIN: 1
NECK STIFFNESS: 0
HEADACHES: 0
EYE DISCHARGE: 0
SORE THROAT: 0
NEUROLOGICAL NEGATIVE: 1
NECK PAIN: 0
CHEST TIGHTNESS: 0
LIGHT-HEADEDNESS: 0
DIZZINESS: 0
TROUBLE SWALLOWING: 0
APPETITE CHANGE: 0
COUGH: 1
SINUS PRESSURE: 0

## 2018-04-03 NOTE — ED NOTES
Patient presents with sore throat and coughing X today.  Patient also has c/o pain in her back started today.

## 2018-04-03 NOTE — ED AVS SNAPSHOT
[facility]    [address]    [place] [address]    Phone:  [phone]                                       Bel Reardon   MRN: [id number]    Department:  [facility]   Date of Visit:  4/3/2018           Patient Information     Date Of Birth          1940        Your diagnoses for this visit were:     Elevated blood pressure reading     Anxiety attack Resolved    History of back pain Resolved    Acute bronchitis, unspecified organism       Follow-up Information     Follow up with Jennie Rubio MD.    Specialty:  Family Practice    Why:  If symptoms worsen    Contact information:    [facility] [place]  [address]  [place] [address] [phone]          Follow up with [facility].    Specialty:  EMERGENCY MEDICINE    Why:  If further concerns develop    Contact information:    [address]  [place] [address] [phone]    Additional information:    From [place]: Take [address]. Turn left at [address]. Turn left at the first stoplight on [address]. At the first stop sign, take a right onto [address]. Take a left into the parking lot and continue through until you reach the North enterance of the building.       From [place]: Take [address]. Take the [address] ramp towards [place]. Turn left onto [address]. Take a slight right onto [address]. Turn left at the first stoplight on [address]. At the first stop sign, take a right onto [address]. Take a left into the parking lot and continue through until you reach the North enterance of the building.       From [place]: Take [address]. Take a right at [address]. At the first stop sign, take a right onto [address]. Take a left into the parking lot and continue through until you reach the North enterance of the building.       Discharge References/Attachments     ACUTE  BRONCHITIS, WHAT IS (ENGLISH)    ANXIETY DISORDERS, UNDERSTANDING (ENGLISH)    PANIC ATTACK (ENGLISH)    HIGH BLOOD PRESSURE, WHAT IS?  (ENGLISH)      Your next 10 appointments already scheduled     Apr 04, 2018 10:45 AM CDT   (Arrive by 10:30 AM)   SHORT with Jennie Rubio MD   [facility] ([facility] )    [address] [address]   [phone]            Apr 16, 2018 11:30 AM CDT   (Arrive by 11:15 AM)   Pre-Op physical with Klarissa Shetty MD   [facility] ([facility] )    [address] [address]   [phone]            Jun 13, 2018  8:30 AM CDT   (Arrive by 8:15 AM)   SHORT with Jennie Rubio MD   [facility] ([facility] )    [address] [address]   [phone]            Nov 12, 2018 10:30 AM CST   (Arrive by 10:15 AM)   Return Visit with Kristal Pagan RD   [facility] ([facility] )    [address] [address] [phone]                 Review of your medicines      START taking        Dose / Directions Last dose taken    sulfamethoxazole-trimethoprim 800-160 MG per tablet   Commonly known as:  BACTRIM DS/SEPTRA DS   Dose:  1 tablet   Quantity:  20 tablet        Take 1 tablet by mouth 2 times daily for 10 days   Refills:  0          Our records show that you are taking the medicines listed below. If these are incorrect, please call your family doctor or clinic.        Dose / Directions Last dose taken    allopurinol 100 MG tablet   Commonly known as:  ZYLOPRIM   Quantity:  135 tablet        TAKE ONE & ONE-HALF TABLETS BY MOUTH DAILY   Refills:  1        blood glucose monitoring lancets   Quantity:  3 each        Use to test blood sugar 1 times daily or as directed.   Refills:  11        blood glucose monitoring meter device kit   Dose:  1 kit   Quantity:  1 kit        1 kit by In Vitro route daily Use to test  blood sugar 1 times daily or as directed.   Refills:  0        fenofibrate 145 MG tablet   Dose:  145 mg   Quantity:  90 tablet        Take 1 tablet (145 mg) by mouth daily   Refills:  0        Fish Oil 1000 MG Cpdr   Dose:  1 capsule        Take 1 capsule by mouth daily   Refills:  0        hydrocortisone 2.5 % cream   Quantity:  30 g        Apply topically 2 times daily   Refills:  0        levothyroxine 25 MCG tablet   Commonly known as:  SYNTHROID/LEVOTHROID   Quantity:  90 tablet        TAKE 1 TABLET BY MOUTH EVERY DAY   Refills:  3        metFORMIN 500 MG tablet   Commonly known as:  GLUCOPHAGE   Quantity:  60 tablet        TAKE 1 TABLET BY MOUTH WITH BREAKFAST AND 1 TABLET WITH SUPPER   Refills:  2        ONETOUCH ULTRA test strip   Quantity:  100 each   Generic drug:  blood glucose monitoring        TEST BLOOD SURGARS ONCE A DAY   Refills:  3        valsartan 80 MG tablet   Commonly known as:  DIOVAN   Quantity:  180 tablet        TAKE ONE TABLET TWO TIMES A DAY BY MOUTH - GENERIC FOR DIOVAN   Refills:  3                Prescriptions were sent or printed at these locations (1 Prescription)                   [id number] - [place] - [address] [address] [address] [place] [address]    Telephone:  [phone]   Fax:  [phone]   Hours:                  E-Prescribed (1 of 1)         sulfamethoxazole-trimethoprim (BACTRIM DS/SEPTRA DS) 800-160 MG per tablet                Orders Needing Specimen Collection     None      Pending Results     No orders found from 4/1/2018 to 4/4/2018.            Pending Culture Results     No orders found from 4/1/2018 to 4/4/2018.            Thank you for choosing [facility]       Thank you for choosing [facility] for your care. Our goal is always to provide you with excellent care. Hearing back from our patients is one way we can continue to improve our services. Please take a few minutes to complete the written survey that you may receive in the mail after  "you visit with us. Thank you!        [employer] Information     [employer] lets you send messages to your doctor, view your test results, renew your prescriptions, schedule appointments and more. To sign up, go to [email or web address] . Click on \"Log in\" on the left side of the screen, which will take you to the Welcome page. Then click on \"Sign up Now\" on the right side of the page.     You will be asked to enter the access code listed below, as well as some personal information. Please follow the directions to create your username and password.     Your access code is: [email or web address]  Expires: 2018  9:58 AM     Your access code will  in 90 days. If you need help or a new code, please call your [facility] clinic or [phone].        Care EveryWhere ID     This is your Care EveryWhere ID. This could be used by other organizations to access your [facility] medical records  [id number]        Equal Access to Services     SANDOR ESTRELLA : Hadii karin schultz hadasho Socelsoali, waaxda luqadaha, qaybta kaalmada adeegyagrayson, gemini betancourt . So [facility] [phone].    ATENCIÓN: Si habla español, tiene a guaman disposición servicios gratuitos de asistencia lingüística. Llame al [phone].    We comply with applicable federal civil rights laws and [place] laws. We do not discriminate on the basis of race, color, national origin, age, disability, sex, sexual orientation, or gender identity.            After Visit Summary       This is your record. Keep this with you and show to your community pharmacist(s) and doctor(s) at your next visit.                  "

## 2018-04-03 NOTE — TELEPHONE ENCOUNTER
2:14 PM    Reason for Call: OVERBOOK    Patient is having the following symptoms: Upper respiratory for 3 days.    The patient is requesting an appointment for Wed with Dr Rubio.    Was an appointment offered for this call? Yes  If yes : Appointment type short              Date  05/09/18    Preferred method for responding to this message: Telephone Call  What is your phone number ?  [phone]    If we cannot reach you directly, may we leave a detailed response at the number you provided? Yes    Can this message wait until your PCP/provider returns, if unavailable today? Yes, aware provider out today    Nichelle Islas

## 2018-04-03 NOTE — ED AVS SNAPSHOT
[facility]    [address] [address]    Phone:  [phone]                                       Bel Reardon   MRN: [id number]    Department:  [facility]   Date of Visit:  4/3/2018           After Visit Summary Signature Page     I have received my discharge instructions, and my questions have been answered. I have discussed any challenges I see with this plan with the nurse or doctor.    ..........................................................................................................................................  Patient/Patient Representative Signature      ..........................................................................................................................................  Patient Representative Print Name and Relationship to Patient    ..................................................               ................................................  Date                                            Time    ..........................................................................................................................................  Reviewed by Signature/Title    ...................................................              ..............................................  Date                                                            Time

## 2018-04-03 NOTE — ED NOTES
"AUSTIN MCDONNELL agrees to see patient in UC, patient BP is elevated due to \"having a panic attack related to having back pain, I have a BP pill in my purse to take my second dose today if it is needed.\"   "

## 2018-04-04 ENCOUNTER — TELEPHONE (OUTPATIENT)
Dept: FAMILY MEDICINE | Facility: OTHER | Age: 78
End: 2018-04-04

## 2018-04-04 NOTE — ED PROVIDER NOTES
"  History     Chief Complaint   Patient presents with     Back Pain     \"upper back pain started this morning\"     Cold Symptoms     \"started last night\"      Panic Attack     \"related to back pain\"      The history is provided by the patient. No  was used.     Bel Reardon is a 78 year old female who states 30 minutes ago she had left upper back pain. Denies any direct trauma or fall. States it felt tight. Pt states this caused her to have an anxiety attack, which then caused her BP to go up. No headache. No vision changes. No n/v/d/f/c.   At this time, the pt states her back pain has resolved and her anxiety attack has resolved. She is more worried about her cough/congestion x 3-4 days. Pt states she is due to have cataract surgery in the next 1-2 weeks and wants to make sure she is healthy for the surgery. No rash. No change in b/b habits         Problem List:    Patient Active Problem List    Diagnosis Date Noted     Hives 09/11/2017     Priority: Medium     Advance care planning 06/28/2017     Priority: Medium     Advance Care Planning 6/28/2017: ACP Review of Chart / Resources Provided:  Reviewed chart for advance care plan.  Bel Reardon has an up to date advance care plan on file.  Added by Mayra Kim        Advance Care Planning 2/3/2016: Receipt of ACP document:  Received: Health Care Directive which was witnessed or notarized on 9/14/15.  Document previously scanned on 1/15/16.  Validation form completed and sent to be scanned.  Code Status needs to be updated to reflect choices in most recent ACP document. Confirmed/documented designated decision maker(s).  Added by Tamiko Tang             Combined forms of age-related cataract 06/28/2017     Priority: Medium     Chronic right-sided low back pain with right-sided sciatica 03/20/2017     Priority: Medium     Benign essential hypertension 11/16/2016     Priority: Medium     Type 2 diabetes mellitus without " complication, without long-term current use of insulin (H) 11/11/2016     Priority: Medium     Postmenopausal bleeding 12/08/2015     Priority: Medium     Mixed hyperlipidemia 11/18/2015     Priority: Medium        Past Medical History:    Past Medical History:   Diagnosis Date     Agoraphobia with panic disorder 03/24/2011     Anxiety 03/24/2011     DM2 (diabetes mellitus, type 2) (H)      Epistaxis      Fibrocystic Breast Disease 03/24/2011     GERD 03/24/2011     Gout, unspecified 01/04/2011     Hypertension, Benign 03/24/2011     Lichen sclerosus      Normal cardiac stress test 02/28/2013     Pure hypercholesterolemia 01/13/2009       Past Surgical History:    Past Surgical History:   Procedure Laterality Date     COLONOSCOPY N/A 8/21/2015    no further scopes needed.  Surgeon: Yohan Licona DO;  Location: [facility]     DILATION AND CURETTAGE, OPERATIVE HYSTEROSCOPY, COMBINED N/A 1/13/2016    Procedure: COMBINED DILATION AND CURETTAGE, OPERATIVE HYSTEROSCOPY;  Surgeon: Seven Kern MD;  Location: [facility]     left shoulder  1990    nerve damage  s/p trauma; from repetitious work     lumpectomy left breast  1990    benign -- fibroadenoma-left; Facility; [facility]     PHACOEMULSIFICATION WITH STANDARD INTRAOCULAR LENS IMPLANT Left 11/11/2014    Procedure: PHACOEMULSIFICATION WITH STANDARD INTRAOCULAR LENS IMPLANT;  Surgeon: Bobby Arambula MD;  Location: [facility]       Family History:    Family History   Problem Relation Age of Onset     C.A.D. Father      Hypertension Father      Other - See Comments Father 78     hyperlipidemia/MI; cause of death     C.A.D. Mother      s/p AMI, pacemaker     DIABETES Mother 95     Hypertension Mother      Other - See Comments Mother      hyperlipidemia/hypothyroid     CANCER Brother 72     lip; cause of death     CANCER Sister 65     lung     Coronary Artery Disease Brother      gallbladder     Other - See Comments Brother 50     AMI x2     Other - See Comments Sister       hyperlipidemia     Hypertension Sister      Other - See Comments Maternal Grandmother      old age     Unknown/Adopted Maternal Grandfather      Other - See Comments Paternal Grandmother 75     old age     Unknown/Adopted Paternal Grandfather        Social History:  Marital Status:   [5]  Social History   Substance Use Topics     Smoking status: Former Smoker     Years: 14.00     Types: Cigarettes     Smokeless tobacco: Never Used      Comment: 20.00 per day; quit 1991     Alcohol use 0.0 oz/week     0 Standard drinks or equivalent per week      Comment: 1 beer weekly        Medications:      sulfamethoxazole-trimethoprim (BACTRIM DS/SEPTRA DS) 800-160 MG per tablet   allopurinol (ZYLOPRIM) 100 MG tablet   metFORMIN (GLUCOPHAGE) 500 MG tablet   levothyroxine (SYNTHROID/LEVOTHROID) 25 MCG tablet   valsartan (DIOVAN) 80 MG tablet   fenofibrate 145 MG tablet   hydrocortisone 2.5 % cream   blood glucose monitoring (ONE TOUCH DELICA) lancets   ONE TOUCH ULTRA test strip   Omega-3 Fatty Acids (FISH OIL) 1000 MG CPDR   blood glucose monitoring (ONE TOUCH ULTRA MINI) meter device kit         Review of Systems   Constitutional: Negative.  Negative for appetite change, fatigue and fever.   HENT: Positive for congestion. Negative for ear pain, sinus pressure, sore throat, trouble swallowing and voice change.    Eyes: Negative for discharge, redness and visual disturbance.   Respiratory: Positive for cough. Negative for chest tightness.    Cardiovascular: Negative for chest pain.   Gastrointestinal: Negative for abdominal pain, diarrhea, nausea and vomiting.   Genitourinary: Negative.    Musculoskeletal: Positive for back pain (resolved). Negative for neck pain and neck stiffness.   Skin: Negative for rash.   Neurological: Negative.  Negative for dizziness, light-headedness and headaches.   Psychiatric/Behavioral: The patient is nervous/anxious (resolved).        Physical Exam   BP: (!) 234/120 (manual)  Pulse:  107  Temp: 98.5  F (36.9  C)  Resp: 18  Weight: 77.1 kg (170 lb)  SpO2: 95 %      Physical Exam   Constitutional: She is oriented to person, place, and time. She appears well-developed and well-nourished. No distress.   HENT:   Head: Normocephalic and atraumatic.   Right Ear: External ear normal.   Left Ear: External ear normal.   Mouth/Throat: Oropharynx is clear and moist.   Bilateral TMs/canals clear/wnl  No sinus TTP     Eyes: Conjunctivae and EOM are normal. Right eye exhibits no discharge. Left eye exhibits no discharge.   Neck: Normal range of motion. Neck supple.   Cardiovascular: Normal rate, regular rhythm and normal heart sounds.    Pulmonary/Chest: Effort normal and breath sounds normal. No respiratory distress.   LLL rhonchi   Abdominal: Soft. Bowel sounds are normal. She exhibits no distension. There is no tenderness.   Neurological: She is alert and oriented to person, place, and time. No cranial nerve deficit. Coordination normal.   Skin: Skin is warm and dry. No rash noted. She is not diaphoretic.   Psychiatric: She has a normal mood and affect. Her speech is rapid and/or pressured.   Nursing note and vitals reviewed.      ED Course     ED Course     Procedures            Assessments & Plan (with Medical Decision Making)     I have reviewed the nursing notes.    I have reviewed the findings, diagnosis, plan and need for follow up with the patient.      Discharge Medication List as of 4/3/2018  6:38 PM      START taking these medications    Details   sulfamethoxazole-trimethoprim (BACTRIM DS/SEPTRA DS) 800-160 MG per tablet Take 1 tablet by mouth 2 times daily for 10 days, Disp-20 tablet, R-0, E-Prescribe             Final diagnoses:   Elevated blood pressure reading   Anxiety attack - Resolved   History of back pain - Resolved   Acute bronchitis, unspecified organism         Patient verbally educated and given appropriate education sheets for each of the diagnoses and has no questions.  Take OTC  motrin or tylenol as directed on the bottle as needed.  Take prescription medications as directed.  Increase fluids, wash hands often.  Sleep in a recliner or with multiple pillows until this has resolved.  Follow up with your provider if symptoms increase or if concerns develop, return to the ER.  Lata Love Certified   Physician Assistant  4/3/2018  9:40 PM  URGENT CARE CLINIC    4/3/2018   [facility]     Lata Love PA  04/03/18 [id number]

## 2018-04-04 NOTE — TELEPHONE ENCOUNTER
9:59 AM    Reason for Call: Phone Call    Description: Patient called in to cancel appt, she went in to the er yesterday and has antibiotics. No call back needed.     Was an appointment offered for this call? No  If yes : Appointment type              Date    Preferred method for responding to this message: Telephone Call  What is your phone number ? No call back needed     If we cannot reach you directly, may we leave a detailed response at the number you provided? No    Can this message wait until your PCP/provider returns, if available today? Not applicable,     Pamela Esteban

## 2018-04-09 ENCOUNTER — OFFICE VISIT (OUTPATIENT)
Dept: FAMILY MEDICINE | Facility: OTHER | Age: 78
End: 2018-04-09
Attending: FAMILY MEDICINE
Payer: COMMERCIAL

## 2018-04-09 VITALS
HEART RATE: 77 BPM | WEIGHT: 174.4 LBS | RESPIRATION RATE: 18 BRPM | DIASTOLIC BLOOD PRESSURE: 76 MMHG | TEMPERATURE: 97.9 F | HEIGHT: 59 IN | OXYGEN SATURATION: 97 % | SYSTOLIC BLOOD PRESSURE: 146 MMHG | BODY MASS INDEX: 35.16 KG/M2

## 2018-04-09 DIAGNOSIS — R06.2 WHEEZING: ICD-10-CM

## 2018-04-09 DIAGNOSIS — J06.9 VIRAL URI WITH COUGH: ICD-10-CM

## 2018-04-09 DIAGNOSIS — E11.9 TYPE 2 DIABETES MELLITUS WITHOUT COMPLICATION, WITHOUT LONG-TERM CURRENT USE OF INSULIN (H): Primary | ICD-10-CM

## 2018-04-09 PROCEDURE — 99213 OFFICE O/P EST LOW 20 MIN: CPT | Performed by: FAMILY MEDICINE

## 2018-04-09 PROCEDURE — G0463 HOSPITAL OUTPT CLINIC VISIT: HCPCS

## 2018-04-09 RX ORDER — ALBUTEROL SULFATE 90 UG/1
2 AEROSOL, METERED RESPIRATORY (INHALATION) EVERY 6 HOURS PRN
Qty: 1 INHALER | Refills: 0 | Status: SHIPPED | OUTPATIENT
Start: 2018-04-09 | End: 2018-05-10

## 2018-04-09 ASSESSMENT — PAIN SCALES - GENERAL: PAINLEVEL: NO PAIN (0)

## 2018-04-09 NOTE — NURSING NOTE
"Chief Complaint   Patient presents with     Urgent Care     follow up       Initial /76 (BP Location: Left arm, Patient Position: Sitting, Cuff Size: Adult Regular)  Pulse 77  Temp 97.9  F (36.6  C) (Tympanic)  Resp 18  Ht 4' 10.75\" (1.492 m)  Wt 174 lb 6.4 oz (79.1 kg)  SpO2 97%  BMI 35.52 kg/m2 Estimated body mass index is 35.52 kg/(m^2) as calculated from the following:    Height as of this encounter: 4' 10.75\" (1.492 m).    Weight as of this encounter: 174 lb 6.4 oz (79.1 kg).  Medication Reconciliation: complete   Louise Vega MA  "

## 2018-04-09 NOTE — PROGRESS NOTES
SUBJECTIVE:                                                    Bel Reardon is a 78 year old female who presents to clinic today for the following health issues:        ED/UC Followup:    Facility:  [facility]  Date of visit: 04/03/18  Reason for visit: High blood pressure, anxiety attack, bronchitis  Current Status: Patient pressure has come down, still congested, panic attack has stopped.  Pt feels that the medication for bronchitis has not been helping.     Was rx'd bactrim but no better at all    Problem list and histories reviewed & adjusted, as indicated.  Additional history: as documented    Patient Active Problem List   Diagnosis     Advance care planning     Mixed hyperlipidemia     Postmenopausal bleeding     Type 2 diabetes mellitus without complication, without long-term current use of insulin (H)     Benign essential hypertension     Chronic right-sided low back pain with right-sided sciatica     Combined forms of age-related cataract     Hives     Past Surgical History:   Procedure Laterality Date     COLONOSCOPY N/A 8/21/2015    no further scopes needed.  Surgeon: Yohan Licona DO;  Location: [facility]     DILATION AND CURETTAGE, OPERATIVE HYSTEROSCOPY, COMBINED N/A 1/13/2016    Procedure: COMBINED DILATION AND CURETTAGE, OPERATIVE HYSTEROSCOPY;  Surgeon: Seven Kern MD;  Location: [facility]     left shoulder  1990    nerve damage  s/p trauma; from repetitious work     lumpectomy left breast  1990    benign -- fibroadenoma-left; Facility; [facility]     PHACOEMULSIFICATION WITH STANDARD INTRAOCULAR LENS IMPLANT Left 11/11/2014    Procedure: PHACOEMULSIFICATION WITH STANDARD INTRAOCULAR LENS IMPLANT;  Surgeon: Bobby Arambula MD;  Location: [facility]       Social History   Substance Use Topics     Smoking status: Former Smoker     Years: 14.00     Types: Cigarettes     Smokeless tobacco: Never Used      Comment: 20.00 per day; quit 1991     Alcohol use 0.0 oz/week     0 Standard drinks or  equivalent per week      Comment: 1 beer weekly     Family History   Problem Relation Age of Onset     C.A.D. Father      Hypertension Father      Other - See Comments Father 78     hyperlipidemia/MI; cause of death     C.A.D. Mother      s/p AMI, pacemaker     DIABETES Mother 95     Hypertension Mother      Other - See Comments Mother      hyperlipidemia/hypothyroid     CANCER Brother 72     lip; cause of death     CANCER Sister 65     lung     Coronary Artery Disease Brother      gallbladder     Other - See Comments Brother 50     AMI x2     Other - See Comments Sister      hyperlipidemia     Hypertension Sister      Other - See Comments Maternal Grandmother      old age     Unknown/Adopted Maternal Grandfather      Other - See Comments Paternal Grandmother 75     old age     Unknown/Adopted Paternal Grandfather          Current Outpatient Prescriptions   Medication Sig Dispense Refill     albuterol (PROAIR HFA/PROVENTIL HFA/VENTOLIN HFA) 108 (90 BASE) MCG/ACT Inhaler Inhale 2 puffs into the lungs every 6 hours as needed for shortness of breath / dyspnea or wheezing 1 Inhaler 0     sulfamethoxazole-trimethoprim (BACTRIM DS/SEPTRA DS) 800-160 MG per tablet Take 1 tablet by mouth 2 times daily for 10 days 20 tablet 0     allopurinol (ZYLOPRIM) 100 MG tablet TAKE ONE & ONE-HALF TABLETS BY MOUTH DAILY 135 tablet 1     metFORMIN (GLUCOPHAGE) 500 MG tablet TAKE 1 TABLET BY MOUTH WITH BREAKFAST AND 1 TABLET WITH SUPPER 60 tablet 2     levothyroxine (SYNTHROID/LEVOTHROID) 25 MCG tablet TAKE 1 TABLET BY MOUTH EVERY DAY 90 tablet 3     valsartan (DIOVAN) 80 MG tablet TAKE ONE TABLET TWO TIMES A DAY BY MOUTH - GENERIC FOR DIOVAN 180 tablet 3     fenofibrate 145 MG tablet Take 1 tablet (145 mg) by mouth daily 90 tablet 0     hydrocortisone 2.5 % cream Apply topically 2 times daily 30 g 0     blood glucose monitoring (ONE TOUCH DELICA) lancets Use to test blood sugar 1 times daily or as directed. 3 each 11     ONE TOUCH ULTRA  "test strip TEST BLOOD SURGARS ONCE A  each 3     Omega-3 Fatty Acids (FISH OIL) 1000 MG CPDR Take 1 capsule by mouth daily       blood glucose monitoring (ONE TOUCH ULTRA MINI) meter device kit 1 kit by [employer] route daily Use to test blood sugar 1 times daily or as directed. 1 kit 0     Allergies   Allergen Reactions     Aspirin      Tightness in chest       Erythromycin Base [Kdc:Yellow Dye+Erythromycin+Brilliant Blue Fcf]      Ezetimibe Other (See Comments)     Aches; Zetia       Fenofibrate Micronized [Fenofibrate]      Aches; Tricor     Levofloxacin      Flu-like symptoms.     Lisinopril Other (See Comments)     headache     No Clinical Screening - See Comments      Fenofibrate nanocrystallized; Aches- Tricor     Pravastatin Sodium Other (See Comments)     Myalgia; Pravachol     Dexamethasone Rash     Maxidex     Dexamethasone Sod Phosphate [Dexamethasone Sodium Phosphate] Rash     Maxidex     Niacin Rash     Rosuvastatin Calcium Rash     crestor       Recent Labs   Lab Test  02/15/18   0822  11/01/17   0831  08/20/17   1132  06/28/17   0832  03/20/17   0834  12/14/16   0815   A1C  6.8*  6.9*   --   6.7*  6.5*  6.7*   LDL   --   131*   --    --   161*  154*   HDL   --   48*   --    --   53  51   TRIG   --   346*   --    --   340*  278*   ALT   --    --   69*   --   55*  55*   CR   --    --   0.65   --   0.83  0.74   GFRESTIMATED   --    --   89   --   67  77   GFRESTBLACK   --    --   >90   --   82  >90   GFR Calc     POTASSIUM   --    --   3.7   --   4.4  4.0   TSH   --   2.67   --    --    --   3.23      Labs reviewed in EPIC    ROS:  Constitutional, HEENT, cardiovascular, pulmonary, gi and gu systems are negative, except as otherwise noted.    OBJECTIVE:                                                    /76 (BP Location: Left arm, Patient Position: Sitting, Cuff Size: Adult Regular)  Pulse 77  Temp 97.9  F (36.6  C) (Tympanic)  Resp 18  Ht 4' 10.75\" (1.492 m)  Wt 174 lb " 6.4 oz (79.1 kg)  SpO2 97%  BMI 35.52 kg/m2  Body mass index is 35.52 kg/(m^2).  GENERAL APPEARANCE: healthy, alert, mild distress and fatigued  HENT: ear canals and TM's normal and nose and mouth without ulcers or lesions  NECK: no adenopathy, no asymmetry, masses, or scars and thyroid normal to palpation  RESP: lungs clear to auscultation - no rales, rhonchi or wheezes and expiratory wheezes throughout  CV: regular rates and rhythm, normal S1 S2, no S3 or S4 and no murmur, click or rub  PSYCH: mentation appears normal and affect normal/bright       ASSESSMENT/PLAN:                                                    1. Type 2 diabetes mellitus without complication, without long-term current use of insulin (H)  Labs for june  - Hemoglobin A1c; Standing    2. Viral URI with cough    1.  Nasal saline rinse/spray 2-3x/day (brands:  Ocean, Ayr, Juma-Med, etc)  2.  Afrin nasal spray (only use 3 days, not longer!)  1-2x/day  3.  claritin or zrytec daily (always buy cheapest brand)    - albuterol (PROAIR HFA/PROVENTIL HFA/VENTOLIN HFA) 108 (90 BASE) MCG/ACT Inhaler; Inhale 2 puffs into the lungs every 6 hours as needed for shortness of breath / dyspnea or wheezing  Dispense: 1 Inhaler; Refill: 0    3. Wheezing    - albuterol (PROAIR HFA/PROVENTIL HFA/VENTOLIN HFA) 108 (90 BASE) MCG/ACT Inhaler; Inhale 2 puffs into the lungs every 6 hours as needed for shortness of breath / dyspnea or wheezing  Dispense: 1 Inhaler; Refill: 0    Patient was agreeable to this plan and had no further questions.  See Patient Instructions    Jennie Rubio MD  [facility]

## 2018-04-09 NOTE — MR AVS SNAPSHOT
After Visit Summary   4/9/2018    Bel Reardon    MRN: [id number]           Patient Information     Date Of Birth          1940        Visit Information        Provider Department      4/9/2018 9:00 AM Jennie Rubio MD [facility]        Today's Diagnoses     Type 2 diabetes mellitus without complication, without long-term current use of insulin (H)    -  1    Viral URI with cough        Wheezing           Follow-ups after your visit        Your next 10 appointments already scheduled     Apr 16, 2018 11:30 AM CDT   (Arrive by 11:15 AM)   Pre-Op physical with Klarissa Shetty MD   [facility] ([facility] )    [address] [address]   [phone]            Jun 13, 2018  8:30 AM CDT   (Arrive by 8:15 AM)   SHORT with Jennie Rubio MD   [facility] ([facility] )    [address] [id number] [phone]            Nov 12, 2018 10:30 AM CST   (Arrive by 10:15 AM)   Return Visit with Kristal Pgaan RD   [facility] ([facility] )    [address] [id number] [phone]              Future tests that were ordered for you today     Open Standing Orders        Priority Remaining Interval Expires Ordered    Hemoglobin A1c Routine 4/4 4/9/2019 4/9/2018            Who to contact     If you have questions or need follow up information about today's clinic visit or your schedule please contact [facility] directly at [phone].  Normal or non-critical lab and imaging results will be communicated to you by MyChart, letter or phone within 4 business days after the clinic has received the results. If you do not hear from us within 7 days, please contact the clinic through MyChart or phone. If you have a critical or abnormal lab result, we will notify you by phone as soon as possible.  Submit refill requests through [employer] or call  "your pharmacy and they will forward the refill request to us. Please allow 3 business days for your refill to be completed.          Additional Information About Your Visit        MyChart Information     [employer] lets you send messages to your doctor, view your test results, renew your prescriptions, schedule appointments and more. To sign up, go to [email or web address] . Click on \"Log in\" on the left side of the screen, which will take you to the Welcome page. Then click on \"Sign up Now\" on the right side of the page.     You will be asked to enter the access code listed below, as well as some personal information. Please follow the directions to create your username and password.     Your access code is: [email or web address]  Expires: 2018  9:58 AM     Your access code will  in 90 days. If you need help or a new code, please call your [place] clinic or [phone].        Care EveryWhere ID     This is your Care EveryWhere ID. This could be used by other organizations to access your [place] medical records  [id number]        Your Vitals Were     Pulse Temperature Respirations Height Pulse Oximetry BMI (Body Mass Index)    77 97.9  F (36.6  C) (Tympanic) 18 4' 10.75\" (1.492 m) 97% 35.52 kg/m2       Blood Pressure from Last 3 Encounters:   18 146/76   18 (!) 201/111   02/15/18 130/78    Weight from Last 3 Encounters:   18 174 lb 6.4 oz (79.1 kg)   18 170 lb (77.1 kg)   02/15/18 176 lb 9.6 oz (80.1 kg)                 Today's Medication Changes          These changes are accurate as of 18  9:32 AM.  If you have any questions, ask your nurse or doctor.               Start taking these medicines.        Dose/Directions    albuterol 108 (90 BASE) MCG/ACT Inhaler   Commonly known as:  PROAIR HFA/PROVENTIL HFA/VENTOLIN HFA   Used for:  Viral URI with cough, Wheezing   Started by:  Jennie Rubio MD        Dose:  2 puff   Inhale 2 puffs into the lungs every 6 hours as needed for " shortness of breath / dyspnea or wheezing   Quantity:  1 Inhaler   Refills:  0            Where to get your medicines      These medications were sent to [id number] - [place], [place] - [address]  [address] [place] [address]     Phone:  [phone]     albuterol 108 (90 BASE) MCG/ACT Inhaler                Primary Care Provider Office Phone # Fax #    Jennie Rubio [phone]       [facility] [place] [address]  [place] [place] [address]        Equal Access to Services     [facility]: Hadii aad ku hadasho Soomaali, waaxda luqadaha, qaybta kaalmada adeegyada, waxay idiin hayaan adeiain betancourt . So [facility] [phone].    ATENCIÓN: Si habla español, tiene a guaman disposición servicios gratuitos de asistencia lingüística. [facility] [phone].    We comply with applicable federal civil rights laws and [place] laws. We do not discriminate on the basis of race, color, national origin, age, disability, sex, sexual orientation, or gender identity.            Thank you!     Thank you for choosing [facility]  for your care. Our goal is always to provide you with excellent care. Hearing back from our patients is one way we can continue to improve our services. Please take a few minutes to complete the written survey that you may receive in the mail after your visit with us. Thank you!             Your Updated Medication List - Protect others around you: Learn how to safely use, store and throw away your medicines at www.disposemymeds.org.          This list is accurate as of 4/9/18  9:32 AM.  Always use your most recent med list.                   Brand Name Dispense Instructions for use Diagnosis    albuterol 108 (90 BASE) MCG/ACT Inhaler    PROAIR HFA/PROVENTIL HFA/VENTOLIN HFA    1 Inhaler    Inhale 2 puffs into the lungs every 6 hours as needed for shortness of breath / dyspnea or wheezing    Viral URI with cough, Wheezing       allopurinol 100 MG  tablet    ZYLOPRIM    135 tablet    TAKE ONE & ONE-HALF TABLETS BY MOUTH DAILY    Idiopathic gout, unspecified chronicity, unspecified site       blood glucose monitoring lancets     3 each    Use to test blood sugar 1 times daily or as directed.    Type 2 diabetes mellitus without complication, without long-term current use of insulin (H)       blood glucose monitoring meter device kit     1 kit    1 kit by In Vitro route daily Use to test blood sugar 1 times daily or as directed.    Type 2 diabetes mellitus without complication, without long-term current use of insulin (H)       fenofibrate 145 MG tablet     90 tablet    Take 1 tablet (145 mg) by mouth daily    Mixed hyperlipidemia       Fish Oil 1000 MG Cpdr      Take 1 capsule by mouth daily        hydrocortisone 2.5 % cream     30 g    Apply topically 2 times daily    Hives       levothyroxine 25 MCG tablet    SYNTHROID/LEVOTHROID    90 tablet    TAKE 1 TABLET BY MOUTH EVERY DAY    Other specified hypothyroidism       metFORMIN 500 MG tablet    GLUCOPHAGE    60 tablet    TAKE 1 TABLET BY MOUTH WITH BREAKFAST AND 1 TABLET WITH SUPPER    Type 2 diabetes mellitus without complication, without long-term current use of insulin (H)       ONETOUCH ULTRA test strip   Generic drug:  blood glucose monitoring     100 each    TEST BLOOD SURGARS ONCE A DAY    Type 2 diabetes mellitus without complication (H)       sulfamethoxazole-trimethoprim 800-160 MG per tablet    BACTRIM DS/SEPTRA DS    20 tablet    Take 1 tablet by mouth 2 times daily for 10 days        valsartan 80 MG tablet    DIOVAN    180 tablet    TAKE ONE TABLET TWO TIMES A DAY BY MOUTH - GENERIC FOR DIOVAN    Benign essential hypertension

## 2018-04-10 ASSESSMENT — PATIENT HEALTH QUESTIONNAIRE - PHQ9: SUM OF ALL RESPONSES TO PHQ QUESTIONS 1-9: 1

## 2018-04-27 ENCOUNTER — OFFICE VISIT (OUTPATIENT)
Dept: FAMILY MEDICINE | Facility: OTHER | Age: 78
End: 2018-04-27
Attending: FAMILY MEDICINE
Payer: COMMERCIAL

## 2018-04-27 VITALS
BODY MASS INDEX: 35.64 KG/M2 | DIASTOLIC BLOOD PRESSURE: 86 MMHG | WEIGHT: 176.8 LBS | OXYGEN SATURATION: 95 % | TEMPERATURE: 97.5 F | SYSTOLIC BLOOD PRESSURE: 158 MMHG | HEIGHT: 59 IN | HEART RATE: 65 BPM

## 2018-04-27 DIAGNOSIS — R53.83 FATIGUE, UNSPECIFIED TYPE: ICD-10-CM

## 2018-04-27 DIAGNOSIS — I10 BENIGN ESSENTIAL HYPERTENSION: ICD-10-CM

## 2018-04-27 DIAGNOSIS — J06.9 URI, ACUTE: Primary | ICD-10-CM

## 2018-04-27 PROCEDURE — 99213 OFFICE O/P EST LOW 20 MIN: CPT | Performed by: FAMILY MEDICINE

## 2018-04-27 PROCEDURE — G0463 HOSPITAL OUTPT CLINIC VISIT: HCPCS

## 2018-04-27 ASSESSMENT — PAIN SCALES - GENERAL: PAINLEVEL: NO PAIN (0)

## 2018-04-27 NOTE — NURSING NOTE
"Chief Complaint   Patient presents with     URI       Initial /80  Pulse 65  Temp 97.5  F (36.4  C)  Ht 4' 10.75\" (1.492 m)  Wt 176 lb 12.8 oz (80.2 kg)  SpO2 95%  BMI 36.01 kg/m2 Estimated body mass index is 36.01 kg/(m^2) as calculated from the following:    Height as of this encounter: 4' 10.75\" (1.492 m).    Weight as of this encounter: 176 lb 12.8 oz (80.2 kg).  Medication Reconciliation: complete   [facility]   Medical Assistant      "

## 2018-04-27 NOTE — PATIENT INSTRUCTIONS
Further antibiotics not needed.  Can stop inhaler if not helping.  Continue saline rinses for sinuses.  Can add Claritin (Loratidine) or Zyrtec (Cetirizine) as an antihistamine 10 mg once daily.

## 2018-04-27 NOTE — PROGRESS NOTES
SUBJECTIVE:   Bel Reardon is a 78 year old female who presents to clinic today for the following health issues:      RESPIRATORY SYMPTOMS      Duration: Over 3 weeks    Description  cough, fatigue/malaise and hoarse voice    Severity: mild  Went to urgent care 4/3/2018    Accompanying signs and symptoms: cough clear mucous    History (predisposing factors):  Was exposed to someone sick    Precipitating or alleviating factors: None    Therapies tried and outcome:  Tried nyquil,dayquil ,not any better      ER 4/3/18 treated with Bactrim for bronchitis.  Did not help.  Did not complete course.  Was seen in follow up 4/9 with Dr. Rubio.  Felt to be viral.  Added saline, Afrin, Claritin, Albuterol.  Per patient, she doesn't recall being told to take an antihistamine.  Inhaler made her cough more.    Cough is lingering and general fatigue.    No sore throat, sinus congestion, ear pain, vomiting, diarrhea. No fever.  Phlegm is clear.        Problem list and histories reviewed & adjusted, as indicated.  Additional history: as documented    Current Outpatient Prescriptions   Medication     allopurinol (ZYLOPRIM) 100 MG tablet     blood glucose monitoring (ONE TOUCH DELICA) lancets     blood glucose monitoring (ONE TOUCH ULTRA MINI) meter device kit     hydrocortisone 2.5 % cream     levothyroxine (SYNTHROID/LEVOTHROID) 25 MCG tablet     metFORMIN (GLUCOPHAGE) 500 MG tablet     Omega-3 Fatty Acids (FISH OIL) 1000 MG CPDR     ONE TOUCH ULTRA test strip     valsartan (DIOVAN) 80 MG tablet     albuterol (PROAIR HFA/PROVENTIL HFA/VENTOLIN HFA) 108 (90 BASE) MCG/ACT Inhaler     fenofibrate 145 MG tablet     No current facility-administered medications for this visit.        Patient Active Problem List   Diagnosis     Advance care planning     Mixed hyperlipidemia     Postmenopausal bleeding     Type 2 diabetes mellitus without complication, without long-term current use of insulin (H)     Benign essential hypertension      Chronic right-sided low back pain with right-sided sciatica     Combined forms of age-related cataract     Hives     Past Surgical History:   Procedure Laterality Date     COLONOSCOPY N/A 8/21/2015    no further scopes needed.  Surgeon: Yohan Licona DO;  Location: [facility]     DILATION AND CURETTAGE, OPERATIVE HYSTEROSCOPY, COMBINED N/A 1/13/2016    Procedure: COMBINED DILATION AND CURETTAGE, OPERATIVE HYSTEROSCOPY;  Surgeon: Seven Kern MD;  Location: [facility]     left shoulder  1990    nerve damage  s/p trauma; from repetitious work     lumpectomy left breast  1990    benign -- fibroadenoma-left; Facility; [facility]     PHACOEMULSIFICATION WITH STANDARD INTRAOCULAR LENS IMPLANT Left 11/11/2014    Procedure: PHACOEMULSIFICATION WITH STANDARD INTRAOCULAR LENS IMPLANT;  Surgeon: Bobby Arambula MD;  Location: [facility]       Social History   Substance Use Topics     Smoking status: Former Smoker     Years: 14.00     Types: Cigarettes     Smokeless tobacco: Never Used      Comment: 20.00 per day; quit 1991     Alcohol use 0.0 oz/week     0 Standard drinks or equivalent per week      Comment: 1 beer weekly     Family History   Problem Relation Age of Onset     C.A.D. Father      Hypertension Father      Other - See Comments Father 78     hyperlipidemia/MI; cause of death     C.A.D. Mother      s/p AMI, pacemaker     DIABETES Mother 95     Hypertension Mother      Other - See Comments Mother      hyperlipidemia/hypothyroid     CANCER Brother 72     lip; cause of death     CANCER Sister 65     lung     Coronary Artery Disease Brother      gallbladder     Other - See Comments Brother 50     AMI x2     Other - See Comments Sister      hyperlipidemia     Hypertension Sister      Other - See Comments Maternal Grandmother      old age     Unknown/Adopted Maternal Grandfather      Other - See Comments Paternal Grandmother 75     old age     Unknown/Adopted Paternal Grandfather            Reviewed and updated as needed this visit  "by clinical staff  Tobacco  Allergies  Meds  Med Hx  Surg Hx  Fam Hx  Soc Hx      Reviewed and updated as needed this visit by Provider  Allergies  Meds         ROS:  Constitutional, HEENT, cardiovascular, pulmonary, gi and gu systems are negative, except as otherwise noted.    OBJECTIVE:     /86 (BP Location: Right arm, Patient Position: Sitting, Cuff Size: Adult Large)  Pulse 65  Temp 97.5  F (36.4  C)  Ht 4' 10.75\" (1.492 m)  Wt 176 lb 12.8 oz (80.2 kg)  SpO2 95%  BMI 36.01 kg/m2  Body mass index is 36.01 kg/(m^2).  GENERAL: healthy, alert and no distress  EYES: Eyes grossly normal to inspection, PERRL and conjunctivae and sclerae normal  HENT: ear canals and TM's normal, nose and mouth without ulcers or lesions  NECK: no adenopathy, no asymmetry, masses, or scars and thyroid normal to palpation  RESP: lungs clear to auscultation - no rales, rhonchi or wheezes  CV: regular rate and rhythm, normal S1 S2, no S3 or S4, no murmur, click or rub, no peripheral edema and peripheral pulses strong  MS: no gross musculoskeletal defects noted, no edema  SKIN: no suspicious lesions or rashes  PSYCH: mentation appears normal, affect normal/bright      ASSESSMENT/PLAN:     (J06.9) URI, acute  (primary encounter diagnosis)  Comment: viral vs allergies    (R53.83) Fatigue, unspecified type  Comment: mild, generalized, improving    (I10) Benign essential hypertension  Comment: uncontrolled  Plan: Is on Diovan; patient states it is elevated when she is hear, but fine after; did advise her to follow up with Dr. Rubio for recheck and medication adjustment    Patient Instructions   Further antibiotics not needed.  Can stop inhaler if not helping.  Continue saline rinses for sinuses.  Can add Claritin (Loratidine) or Zyrtec (Cetirizine) as an antihistamine 10 mg once daily.            Klarissa Sidhu MD  [facility] [place]  "

## 2018-04-27 NOTE — MR AVS SNAPSHOT
After Visit Summary   4/27/2018    Bel Reardon    MRN: [id number]           Patient Information     Date Of Birth          1940        Visit Information        Provider Department      4/27/2018 9:45 AM Klarissa Shetyt MD [facility]        Today's Diagnoses     URI, acute    -  1    Fatigue, unspecified type          Care Instructions    Further antibiotics not needed.  Can stop inhaler if not helping.  Continue saline rinses for sinuses.  Can add Claritin (Loratidine) or Zyrtec (Cetirizine) as an antihistamine 10 mg once daily.            Follow-ups after your visit        Your next 10 appointments already scheduled     May 10, 2018  8:45 AM CDT   (Arrive by 8:30 AM)   Pre-Op physical with Klarissa Shetty MD   [facility] ([facility] )    [address] [place] [address]   [phone]            Jun 13, 2018  8:30 AM CDT   (Arrive by 8:15 AM)   SHORT with Jennie Rubio MD   [facility] )    [address] [place] [address]   [phone]            Nov 12, 2018 10:30 AM CST   (Arrive by 10:15 AM)   Return Visit with Kristal Pagan RD   [facility] ([facility] )    [address] [place] [id number] [phone]              Who to contact     If you have questions or need follow up information about today's clinic visit or your schedule please contact [facility] directly at [phone].  Normal or non-critical lab and imaging results will be communicated to you by MyChart, letter or phone within 4 business days after the clinic has received the results. If you do not hear from us within 7 days, please contact the clinic through MyChart or phone. If you have a critical or abnormal lab result, we will notify you by phone as soon as possible.  Submit refill requests through [employer] or call your pharmacy and they will forward the refill  "request to us. Please allow 3 business days for your refill to be completed.          Additional Information About Your Visit        MyChart Information     [employer] lets you send messages to your doctor, view your test results, renew your prescriptions, schedule appointments and more. To sign up, go to [email or web address] . Click on \"Log in\" on the left side of the screen, which will take you to the Welcome page. Then click on \"Sign up Now\" on the right side of the page.     You will be asked to enter the access code listed below, as well as some personal information. Please follow the directions to create your username and password.     Your access code is: [email or web address]  Expires: 2018  9:58 AM     Your access code will  in 90 days. If you need help or a new code, please call your [place] clinic or [phone].        Care EveryWhere ID     This is your [facility] EveryWhere ID. This could be used by other organizations to access your [place] medical records  [id number]        Your Vitals Were     Pulse Temperature Height Pulse Oximetry BMI (Body Mass Index)       65 97.5  F (36.4  C) 4' 10.75\" (1.492 m) 95% 36.01 kg/m2        Blood Pressure from Last 3 Encounters:   18 152/80   18 146/76   18 (!) 201/111    Weight from Last 3 Encounters:   18 176 lb 12.8 oz (80.2 kg)   18 174 lb 6.4 oz (79.1 kg)   18 170 lb (77.1 kg)              Today, you had the following     No orders found for display       Primary Care Provider Office Phone # Fax #    Jennie Rubio [phone]       [facility] [place] [address]  [place] [address]        Equal Access to Services     SANDOR ESTRELLA : [place], aye blum, qamani kagemini geiger. [facility] [phone].    ATENCIÓN: Si habla español, tiene a guaman disposición servicios gratuitos de asistencia lingüística. Llame al [phone].    We " comply with applicable federal civil rights laws and [place] laws. We do not discriminate on the basis of race, color, national origin, age, disability, sex, sexual orientation, or gender identity.            Thank you!     Thank you for choosing [facility]  for your care. Our goal is always to provide you with excellent care. Hearing back from our patients is one way we can continue to improve our services. Please take a few minutes to complete the written survey that you may receive in the mail after your visit with us. Thank you!             Your Updated Medication List - Protect others around you: Learn how to safely use, store and throw away your medicines at www.disposemymeds.org.          This list is accurate as of 4/27/18  9:55 AM.  Always use your most recent med list.                   Brand Name Dispense Instructions for use Diagnosis    albuterol 108 (90 Base) MCG/ACT Inhaler    PROAIR HFA/PROVENTIL HFA/VENTOLIN HFA    1 Inhaler    Inhale 2 puffs into the lungs every 6 hours as needed for shortness of breath / dyspnea or wheezing    Viral URI with cough, Wheezing       allopurinol 100 MG tablet    ZYLOPRIM    135 tablet    TAKE ONE & ONE-HALF TABLETS BY MOUTH DAILY    Idiopathic gout, unspecified chronicity, unspecified site       blood glucose monitoring lancets     3 each    Use to test blood sugar 1 times daily or as directed.    Type 2 diabetes mellitus without complication, without long-term current use of insulin (H)       blood glucose monitoring meter device kit     1 kit    1 kit by In Vitro route daily Use to test blood sugar 1 times daily or as directed.    Type 2 diabetes mellitus without complication, without long-term current use of insulin (H)       fenofibrate 145 MG tablet     90 tablet    Take 1 tablet (145 mg) by mouth daily    Mixed hyperlipidemia       Fish Oil 1000 MG Cpdr      Take 1 capsule by mouth daily        hydrocortisone 2.5 % cream     30 g    Apply  topically 2 times daily    Hives       levothyroxine 25 MCG tablet    SYNTHROID/LEVOTHROID    90 tablet    TAKE 1 TABLET BY MOUTH EVERY DAY    Other specified hypothyroidism       metFORMIN 500 MG tablet    GLUCOPHAGE    60 tablet    TAKE 1 TABLET BY MOUTH WITH BREAKFAST AND 1 TABLET WITH SUPPER    Type 2 diabetes mellitus without complication, without long-term current use of insulin (H)       ONETOUCH ULTRA test strip   Generic drug:  blood glucose monitoring     100 each    TEST BLOOD SURGARS ONCE A DAY    Type 2 diabetes mellitus without complication (H)       valsartan 80 MG tablet    DIOVAN    180 tablet    TAKE ONE TABLET TWO TIMES A DAY BY MOUTH - GENERIC FOR DIOVAN    Benign essential hypertension

## 2018-05-09 PROBLEM — E03.9 HYPOTHYROIDISM, UNSPECIFIED TYPE: Status: ACTIVE | Noted: 2018-05-09

## 2018-05-10 ENCOUNTER — OFFICE VISIT (OUTPATIENT)
Dept: FAMILY MEDICINE | Facility: OTHER | Age: 78
End: 2018-05-10
Attending: FAMILY MEDICINE
Payer: MEDICARE

## 2018-05-10 VITALS
DIASTOLIC BLOOD PRESSURE: 82 MMHG | TEMPERATURE: 97.1 F | BODY MASS INDEX: 35.7 KG/M2 | SYSTOLIC BLOOD PRESSURE: 186 MMHG | HEART RATE: 59 BPM | WEIGHT: 175.25 LBS | OXYGEN SATURATION: 96 %

## 2018-05-10 DIAGNOSIS — I10 BENIGN ESSENTIAL HTN: ICD-10-CM

## 2018-05-10 DIAGNOSIS — Z01.818 PREOP GENERAL PHYSICAL EXAM: ICD-10-CM

## 2018-05-10 DIAGNOSIS — E11.9 TYPE 2 DIABETES MELLITUS WITHOUT COMPLICATION, WITHOUT LONG-TERM CURRENT USE OF INSULIN (H): ICD-10-CM

## 2018-05-10 DIAGNOSIS — E03.9 HYPOTHYROIDISM, UNSPECIFIED TYPE: ICD-10-CM

## 2018-05-10 DIAGNOSIS — Z01.818 PRE-OP EXAM: Primary | ICD-10-CM

## 2018-05-10 DIAGNOSIS — E78.2 MIXED HYPERLIPIDEMIA: ICD-10-CM

## 2018-05-10 LAB
ALBUMIN SERPL-MCNC: 3.7 G/DL (ref 3.4–5)
ALP SERPL-CCNC: 66 U/L (ref 40–150)
ALT SERPL W P-5'-P-CCNC: 71 U/L (ref 0–50)
ANION GAP SERPL CALCULATED.3IONS-SCNC: 10 MMOL/L (ref 3–14)
AST SERPL W P-5'-P-CCNC: 57 U/L (ref 0–45)
BILIRUB SERPL-MCNC: 0.4 MG/DL (ref 0.2–1.3)
BUN SERPL-MCNC: 12 MG/DL (ref 7–30)
CALCIUM SERPL-MCNC: 8.8 MG/DL (ref 8.5–10.1)
CHLORIDE SERPL-SCNC: 106 MMOL/L (ref 94–109)
CO2 SERPL-SCNC: 22 MMOL/L (ref 20–32)
CREAT SERPL-MCNC: 0.69 MG/DL (ref 0.52–1.04)
ERYTHROCYTE [DISTWIDTH] IN BLOOD BY AUTOMATED COUNT: 13.3 % (ref 10–15)
EST. AVERAGE GLUCOSE BLD GHB EST-MCNC: 166 MG/DL
GFR SERPL CREATININE-BSD FRML MDRD: 83 ML/MIN/1.7M2
GLUCOSE SERPL-MCNC: 212 MG/DL (ref 70–99)
HBA1C MFR BLD: 7.4 % (ref 0–5.6)
HCT VFR BLD AUTO: 42.8 % (ref 35–47)
HGB BLD-MCNC: 15 G/DL (ref 11.7–15.7)
MCH RBC QN AUTO: 31.1 PG (ref 26.5–33)
MCHC RBC AUTO-ENTMCNC: 35 G/DL (ref 31.5–36.5)
MCV RBC AUTO: 89 FL (ref 78–100)
PLATELET # BLD AUTO: 185 10E9/L (ref 150–450)
POTASSIUM SERPL-SCNC: 4.1 MMOL/L (ref 3.4–5.3)
PROT SERPL-MCNC: 7.5 G/DL (ref 6.8–8.8)
RBC # BLD AUTO: 4.83 10E12/L (ref 3.8–5.2)
SODIUM SERPL-SCNC: 138 MMOL/L (ref 133–144)
TSH SERPL DL<=0.005 MIU/L-ACNC: 2.54 MU/L (ref 0.4–4)
WBC # BLD AUTO: 6 10E9/L (ref 4–11)

## 2018-05-10 PROCEDURE — 85027 COMPLETE CBC AUTOMATED: CPT | Mod: ZL | Performed by: FAMILY MEDICINE

## 2018-05-10 PROCEDURE — 84443 ASSAY THYROID STIM HORMONE: CPT | Mod: ZL | Performed by: FAMILY MEDICINE

## 2018-05-10 PROCEDURE — 40000788 ZZHCL STATISTIC ESTIMATED AVERAGE GLUCOSE: Mod: ZL | Performed by: FAMILY MEDICINE

## 2018-05-10 PROCEDURE — 83036 HEMOGLOBIN GLYCOSYLATED A1C: CPT | Mod: ZL | Performed by: FAMILY MEDICINE

## 2018-05-10 PROCEDURE — 80053 COMPREHEN METABOLIC PANEL: CPT | Mod: ZL | Performed by: FAMILY MEDICINE

## 2018-05-10 PROCEDURE — G0463 HOSPITAL OUTPT CLINIC VISIT: HCPCS

## 2018-05-10 PROCEDURE — 36415 COLL VENOUS BLD VENIPUNCTURE: CPT | Mod: ZL | Performed by: FAMILY MEDICINE

## 2018-05-10 PROCEDURE — 99214 OFFICE O/P EST MOD 30 MIN: CPT | Performed by: FAMILY MEDICINE

## 2018-05-10 RX ORDER — VALSARTAN 160 MG/1
160 TABLET ORAL
Qty: 60 TABLET | Refills: 2 | Status: SHIPPED | OUTPATIENT
Start: 2018-05-10 | End: 2019-03-28

## 2018-05-10 ASSESSMENT — ANXIETY QUESTIONNAIRES
5. BEING SO RESTLESS THAT IT IS HARD TO SIT STILL: NOT AT ALL
6. BECOMING EASILY ANNOYED OR IRRITABLE: NOT AT ALL
3. WORRYING TOO MUCH ABOUT DIFFERENT THINGS: NOT AT ALL
2. NOT BEING ABLE TO STOP OR CONTROL WORRYING: NOT AT ALL
GAD7 TOTAL SCORE: 0
7. FEELING AFRAID AS IF SOMETHING AWFUL MIGHT HAPPEN: NOT AT ALL
4. TROUBLE RELAXING: NOT AT ALL
1. FEELING NERVOUS, ANXIOUS, OR ON EDGE: NOT AT ALL

## 2018-05-10 ASSESSMENT — PAIN SCALES - GENERAL: PAINLEVEL: NO PAIN (0)

## 2018-05-10 NOTE — NURSING NOTE
"Chief Complaint   Patient presents with     Pre-Op Exam       Initial /82  Pulse 59  Temp 97.1  F (36.2  C) (Tympanic)  Wt 175 lb 4 oz (79.5 kg)  SpO2 96%  BMI 35.7 kg/m2 Estimated body mass index is 35.7 kg/(m^2) as calculated from the following:    Height as of 4/27/18: 4' 10.75\" (1.492 m).    Weight as of this encounter: 175 lb 4 oz (79.5 kg).  Medication Reconciliation: complete    Margarette Zayas MA    "

## 2018-05-10 NOTE — MR AVS SNAPSHOT
After Visit Summary   5/10/2018    Bel Reardon    MRN: [id number]           Patient Information     Date Of Birth          1940        Visit Information        Provider Department      5/10/2018 9:15 AM Jennie Rubio MD [facility]        Today's Diagnoses     Pre-op exam    -  1    Hypothyroidism, unspecified type        Mixed hyperlipidemia        Type 2 diabetes mellitus without complication, without long-term current use of insulin (H)        Preop general physical exam        Benign essential HTN          Care Instructions      Before Your Surgery      Call your surgeon if there is any change in your health. This includes signs of a cold or flu (such as a sore throat, runny nose, cough, rash or fever).    Do not smoke, drink alcohol or take over the counter medicine (unless your surgeon or primary care doctor tells you to) for the 24 hours before and after surgery.    If you take prescribed drugs: Follow your doctor s orders about which medicines to take and which to stop until after surgery.    Eating and drinking prior to surgery: follow the instructions from your surgeon    Take a shower or bath the night before surgery. Use the soap your surgeon gave you to gently clean your skin. If you do not have soap from your surgeon, use your regular soap. Do not shave or scrub the surgery site.  Wear clean pajamas and have clean sheets on your bed.           Follow-ups after your visit        Your next 10 appointments already scheduled     Jun 13, 2018  8:30 AM CDT   (Arrive by 8:15 AM)   SHORT with Jennie Rubio MD   [facility] ([facility] )    [address] [place] [id number]   [phone]            Nov 12, 2018 10:30 AM CST   (Arrive by 10:15 AM)   Return Visit with Kristal Pagan RD   [facility] ([facility] )    [address] [place] [phone]              Who to contact     If  "you have questions or need follow up information about today's clinic visit or your schedule please contact [facility] directly at [phone].  Normal or non-critical lab and imaging results will be communicated to you by MyChart, letter or phone within 4 business days after the clinic has received the results. If you do not hear from us within 7 days, please contact the clinic through [facility] or phone. If you have a critical or abnormal lab result, we will notify you by phone as soon as possible.  Submit refill requests through [employer] or call your pharmacy and they will forward the refill request to us. Please allow 3 business days for your refill to be completed.          Additional Information About Your Visit        [facility] Information     [employer] lets you send messages to your doctor, view your test results, renew your prescriptions, schedule appointments and more. To sign up, go to [email or web address] . Click on \"Log in\" on the left side of the screen, which will take you to the Welcome page. Then click on \"Sign up Now\" on the right side of the page.     You will be asked to enter the access code listed below, as well as some personal information. Please follow the directions to create your username and password.     Your access code is: [email or web address]  Expires: 2018  9:58 AM     Your access code will  in 90 days. If you need help or a new code, please call your [place] clinic or [phone].        Care EveryWhere ID     This is your Care EveryWhere ID. This could be used by other organizations to access your [place] medical records  [id number]        Your Vitals Were     Pulse Temperature Pulse Oximetry BMI (Body Mass Index)          59 97.1  F (36.2  C) (Tympanic) 96% 35.7 kg/m2         Blood Pressure from Last 3 Encounters:   05/10/18 186/82   18 158/86   18 146/76    Weight from Last 3 Encounters:   05/10/18 175 lb 4 oz (79.5 kg)   18 176 lb 12.8 oz " (80.2 kg)   04/09/18 174 lb 6.4 oz (79.1 kg)              We Performed the Following     CBC with platelets     Comprehensive metabolic panel     Hemoglobin A1c     TSH with free T4 reflex          Today's Medication Changes          These changes are accurate as of 5/10/18 10:17 AM.  If you have any questions, ask your nurse or doctor.               Start taking these medicines.        Dose/Directions    valsartan 160 MG tablet   Commonly known as:  DIOVAN   Used for:  Benign essential HTN   Started by:  Jennie Rubio MD        Dose:  160 mg   Take 1 tablet (160 mg) by mouth 2 times daily   Quantity:  60 tablet   Refills:  2            Where to get your medicines      These medications were sent to [id number] - [place], [place] - [address]  [address] [facility] [address] [facility] [place] [address]     Phone:  [phone]     valsartan 160 MG tablet                Primary Care Provider Office Phone # Fax #    Jennie Rubio [phone]       [facility] [address]  [place] [address]        Equal Access to Services     [facility]: Hadii aad ku hadasho Soomaali, waaxda luqadaha, qaybta kaalmada adeegyada, waxay idiin hayasher betancourt . So [facility] [phone].    ATENCIÓN: Si habla español, tiene a guaman disposición servicios gratuitos de asistencia lingüística. Llame al [phone].    We comply with applicable federal civil rights laws and [place] laws. We do not discriminate on the basis of race, color, national origin, age, disability, sex, sexual orientation, or gender identity.            Thank you!     Thank you for choosing [facility]  for your care. Our goal is always to provide you with excellent care. Hearing back from our patients is one way we can continue to improve our services. Please take a few minutes to complete the written survey that you may receive in the mail after your visit with us. Thank you!             Your Updated Medication  List - Protect others around you: Learn how to safely use, store and throw away your medicines at www.disposemymeds.org.          This list is accurate as of 5/10/18 10:17 AM.  Always use your most recent med list.                   Brand Name Dispense Instructions for use Diagnosis    allopurinol 100 MG tablet    ZYLOPRIM    135 tablet    TAKE ONE & ONE-HALF TABLETS BY MOUTH DAILY    Idiopathic gout, unspecified chronicity, unspecified site       blood glucose monitoring lancets     3 each    Use to test blood sugar 1 times daily or as directed.    Type 2 diabetes mellitus without complication, without long-term current use of insulin (H)       blood glucose monitoring meter device kit     1 kit    1 kit by In Vitro route daily Use to test blood sugar 1 times daily or as directed.    Type 2 diabetes mellitus without complication, without long-term current use of insulin (H)       fenofibrate 145 MG tablet     90 tablet    Take 1 tablet (145 mg) by mouth daily    Mixed hyperlipidemia       Fish Oil 1000 MG Cpdr      Take 1 capsule by mouth daily        hydrocortisone 2.5 % cream     30 g    Apply topically 2 times daily    Hives       levothyroxine 25 MCG tablet    SYNTHROID/LEVOTHROID    90 tablet    TAKE 1 TABLET BY MOUTH EVERY DAY    Other specified hypothyroidism       metFORMIN 500 MG tablet    GLUCOPHAGE    60 tablet    TAKE 1 TABLET BY MOUTH WITH BREAKFAST AND 1 TABLET WITH SUPPER    Type 2 diabetes mellitus without complication, without long-term current use of insulin (H)       ONETOUCH ULTRA test strip   Generic drug:  blood glucose monitoring     100 each    TEST BLOOD SURGARS ONCE A DAY    Type 2 diabetes mellitus without complication (H)       valsartan 160 MG tablet    DIOVAN    60 tablet    Take 1 tablet (160 mg) by mouth 2 times daily    Benign essential HTN

## 2018-05-10 NOTE — PROGRESS NOTES
[facility] (formerly [facility]3] [place]  [address]  [place] [address]  [phone]  Dept: [phone]    PRE-OP EVALUATION:  Today's date: 5/10/2018    Bel Reardon (: 1940) presents for pre-operative evaluation assessment as requested by Dr. Haddad.  She requires evaluation and anesthesia risk assessment prior to undergoing surgery/procedure for treatment of cataracts .    Proposed Surgery/ Procedure: cataract removal  Date of Surgery/ Procedure: 18  Time of Surgery/ Procedure: [facility]  Hospital/Surgical Facility: [facility]  Fax number for surgical facility: unknown  Primary Physician: Jennie Rubio  Type of Anesthesia Anticipated: to be determined    Patient has a Health Care Directive or Living Will:  YES says there is one on file here at [facility]    1. NO - Do you have a history of heart attack, stroke, stent, bypass or surgery on an artery in the head, neck, heart or legs?  2. NO - Do you ever have any pain or discomfort in your chest?  3. NO - Do you have a history of  Heart Failure?  4. NO - Are you troubled by shortness of breath when: walking on the level, up a slight hill or at night?  5. NO - Do you currently have a cold, bronchitis or other respiratory infection?  6. NO - Do you have a cough, shortness of breath or wheezing?  7. NO - Do you sometimes get pains in the calves of your legs when you walk?  8. NO - Do you or anyone in your family have previous history of blood clots?  9. NO - Do you or does anyone in your family have a serious bleeding problem such as prolonged bleeding following surgeries or cuts?  10. NO - Have you ever had problems with anemia or been told to take iron pills?  11. NO - Have you had any abnormal blood loss such as black, tarry or bloody stools, or abnormal vaginal bleeding?  12. NO - Have you ever had a blood transfusion?  13. NO - Have you or any of your relatives ever had problems with anesthesia?  14. NO - Do you have sleep apnea, excessive snoring or daytime  drowsiness?  15. NO - Do you have any prosthetic heart valves?  16. NO - Do you have prosthetic joints?  17. NO - Is there any chance that you may be pregnant?      HPI:     HPI related to upcoming procedure: right cataract      See problem list for active medical problems.  Problems all longstanding and stable, except as noted/documented.  See ROS for pertinent symptoms related to these conditions.                                                                                                                                                          .    MEDICAL HISTORY:     Patient Active Problem List    Diagnosis Date Noted     Hypothyroidism, unspecified type 05/09/2018     Priority: Medium     Hives 09/11/2017     Priority: Medium     Advance care planning 06/28/2017     Priority: Medium     Advance Care Planning 6/28/2017: ACP Review of Chart / Resources Provided:  Reviewed chart for advance care plan.  Bel Reardon has an up to date advance care plan on file.  Added by Mayra Kim        Advance Care Planning 2/3/2016: Receipt of ACP document:  Received: Health Care Directive which was witnessed or notarized on 9/14/15.  Document previously scanned on 1/15/16.  Validation form completed and sent to be scanned.  Code Status needs to be updated to reflect choices in most recent ACP document. Confirmed/documented designated decision maker(s).  Added by Tamiko Tang             Combined forms of age-related cataract 06/28/2017     Priority: Medium     Chronic right-sided low back pain with right-sided sciatica 03/20/2017     Priority: Medium     Benign essential hypertension 11/16/2016     Priority: Medium     Type 2 diabetes mellitus without complication, without long-term current use of insulin (H) 11/11/2016     Priority: Medium     Postmenopausal bleeding 12/08/2015     Priority: Medium     Mixed hyperlipidemia 11/18/2015     Priority: Medium      Past Medical History:   Diagnosis Date      Agoraphobia with panic disorder 03/24/2011     Anxiety 03/24/2011     DM2 (diabetes mellitus, type 2) (H)      Epistaxis      Fibrocystic Breast Disease 03/24/2011     GERD 03/24/2011    Hx H. Pylori     Gout, unspecified 01/04/2011     Hypertension, Benign 03/24/2011     Lichen sclerosus     declines medication     Normal cardiac stress test 02/28/2013    Feb 2013 wnl     Pure hypercholesterolemia 01/13/2009     Past Surgical History:   Procedure Laterality Date     COLONOSCOPY N/A 8/21/2015    no further scopes needed.  Surgeon: Yohan Licona DO;  Location: [facility]     DILATION AND CURETTAGE, OPERATIVE HYSTEROSCOPY, COMBINED N/A 1/13/2016    Procedure: COMBINED DILATION AND CURETTAGE, OPERATIVE HYSTEROSCOPY;  Surgeon: Seven Kern MD;  Location: [facility]     left shoulder  1990    nerve damage  s/p trauma; from repetitious work     lumpectomy left breast  1990    benign -- fibroadenoma-left; Facility; [facility]     PHACOEMULSIFICATION WITH STANDARD INTRAOCULAR LENS IMPLANT Left 11/11/2014    Procedure: PHACOEMULSIFICATION WITH STANDARD INTRAOCULAR LENS IMPLANT;  Surgeon: Bobby Arambula MD;  Location: [facility]     Current Outpatient Prescriptions   Medication Sig Dispense Refill     allopurinol (ZYLOPRIM) 100 MG tablet TAKE ONE & ONE-HALF TABLETS BY MOUTH DAILY 135 tablet 1     blood glucose monitoring (ONE TOUCH DELICA) lancets Use to test blood sugar 1 times daily or as directed. 3 each 11     blood glucose monitoring (ONE TOUCH ULTRA MINI) meter device kit 1 kit by In Vitro route daily Use to test blood sugar 1 times daily or as directed. 1 kit 0     hydrocortisone 2.5 % cream Apply topically 2 times daily 30 g 0     levothyroxine (SYNTHROID/LEVOTHROID) 25 MCG tablet TAKE 1 TABLET BY MOUTH EVERY DAY 90 tablet 3     metFORMIN (GLUCOPHAGE) 500 MG tablet TAKE 1 TABLET BY MOUTH WITH BREAKFAST AND 1 TABLET WITH SUPPER 60 tablet 2     Omega-3 Fatty Acids (FISH OIL) 1000 MG CPDR Take 1 capsule by mouth daily       ONE  TOUCH ULTRA test strip TEST BLOOD SURGARS ONCE A  each 3     valsartan (DIOVAN) 80 MG tablet TAKE ONE TABLET TWO TIMES A DAY BY MOUTH - GENERIC FOR DIOVAN 180 tablet 3     fenofibrate 145 MG tablet Take 1 tablet (145 mg) by mouth daily (Patient not taking: Reported on 4/27/2018) 90 tablet 0     OTC products: None, except as noted above    Allergies   Allergen Reactions     Aspirin      Tightness in chest       Erythromycin Base [Kdc:Yellow Dye+Erythromycin+Brilliant Blue Fcf]      Ezetimibe Other (See Comments)     Aches; Zetia       Fenofibrate Micronized [Fenofibrate]      Aches; Tricor     Levofloxacin      Flu-like symptoms.     Lisinopril Other (See Comments)     headache     No Clinical Screening - See Comments      Fenofibrate nanocrystallized; Aches- Tricor     Pravastatin Sodium Other (See Comments)     Myalgia; Pravachol     Dexamethasone Rash     Maxidex     Dexamethasone Sod Phosphate [Dexamethasone Sodium Phosphate] Rash     Maxidex     Niacin Rash     Rosuvastatin Calcium Rash     crestor        Latex Allergy: NO    Social History   Substance Use Topics     Smoking status: Former Smoker     Years: 14.00     Types: Cigarettes     Smokeless tobacco: Never Used      Comment: 20.00 per day; quit 1991     Alcohol use 0.0 oz/week     0 Standard drinks or equivalent per week      Comment: 1 beer weekly     History   Drug Use No       REVIEW OF SYSTEMS:   CONSTITUTIONAL: NEGATIVE for fever, chills, change in weight  INTEGUMENTARY/SKIN: NEGATIVE for worrisome rashes, moles or lesions  EYES: NEGATIVE for vision changes or irritation  ENT/MOUTH: NEGATIVE for ear, mouth and throat problems  RESP: NEGATIVE for significant cough or SOB  BREAST: NEGATIVE for masses, tenderness or discharge  CV: NEGATIVE for chest pain, palpitations or peripheral edema  GI: NEGATIVE for nausea, abdominal pain, heartburn, or change in bowel habits  : NEGATIVE for frequency, dysuria, or hematuria  MUSCULOSKELETAL: NEGATIVE for  significant arthralgias or myalgia  NEURO: NEGATIVE for weakness, dizziness or paresthesias  ENDOCRINE: NEGATIVE for temperature intolerance, skin/hair changes  HEME: NEGATIVE for bleeding problems  PSYCHIATRIC: NEGATIVE for changes in mood or affect    EXAM:   /82  Pulse 59  Temp 97.1  F (36.2  C) (Tympanic)  Wt 175 lb 4 oz (79.5 kg)  SpO2 96%  BMI 35.7 kg/m2   Repeat /86    GENERAL APPEARANCE: healthy, alert and no distress     EYES: EOMI, PERRL     HENT: ear canals and TM's normal and nose and mouth without ulcers or lesions     NECK: no adenopathy, no asymmetry, masses, or scars and thyroid normal to palpation     RESP: lungs clear to auscultation - no rales, rhonchi or wheezes     CV: regular rates and rhythm, normal S1 S2, no S3 or S4 and no murmur, click or rub     ABDOMEN:  soft, nontender, no HSM or masses and bowel sounds normal     MS: extremities normal- no gross deformities noted, no evidence of inflammation in joints, FROM in all extremities.     SKIN: no suspicious lesions or rashes     NEURO: Normal strength and tone, sensory exam grossly normal, mentation intact and speech normal     PSYCH: mentation appears normal. and affect normal/bright     LYMPHATICS: No cervical adenopathy    DIAGNOSTICS:     Labs Resulted Today:   Results for orders placed or performed in visit on 05/10/18   TSH with free T4 reflex   Result Value Ref Range    TSH 2.54 0.40 - 4.00 mU/L   CBC with platelets   Result Value Ref Range    WBC 6.0 4.0 - 11.0 10e9/L    RBC Count 4.83 3.8 - 5.2 10e12/L    Hemoglobin 15.0 11.7 - 15.7 g/dL    Hematocrit 42.8 35.0 - 47.0 %    MCV 89 78 - 100 fl    MCH 31.1 26.5 - 33.0 pg    MCHC 35.0 31.5 - 36.5 g/dL    RDW 13.3 10.0 - 15.0 %    Platelet Count 185 150 - 450 10e9/L   Comprehensive metabolic panel   Result Value Ref Range    Sodium 138 133 - 144 mmol/L    Potassium 4.1 3.4 - 5.3 mmol/L    Chloride 106 94 - 109 mmol/L    Carbon Dioxide 22 20 - 32 mmol/L    Anion Gap 10 3 -  14 mmol/L    Glucose 212 (H) 70 - 99 mg/dL    Urea Nitrogen 12 7 - 30 mg/dL    Creatinine 0.69 0.52 - 1.04 mg/dL    GFR Estimate 83 >60 mL/min/1.7m2    GFR Estimate If Black >90 >60 mL/min/1.7m2    Calcium 8.8 8.5 - 10.1 mg/dL    Bilirubin Total 0.4 0.2 - 1.3 mg/dL    Albumin 3.7 3.4 - 5.0 g/dL    Protein Total 7.5 6.8 - 8.8 g/dL    Alkaline Phosphatase 66 40 - 150 U/L    ALT 71 (H) 0 - 50 U/L    AST 57 (H) 0 - 45 U/L       Recent Labs   Lab Test  05/10/18   0907  02/15/18   0822  11/01/17   0831  08/20/17   1132   03/20/17   0834   08/13/14   0946  08/11/14   0520   HGB  15.0   --    --   15.3   --    --    < >  13.5  15.2   PLT  185   --    --   192   --    --    < >  213  187   INR   --    --    --    --    --    --    --   1.18  1.08   NA   --    --    --   140   --   142   < >  136   --    POTASSIUM   --    --    --   3.7   --   4.4   < >  3.6   --    CR   --    --    --   0.65   --   0.83   < >  0.76   --    A1C   --   6.8*  6.9*   --    < >  6.5*   < >   --    --     < > = values in this interval not displayed.        IMPRESSION:   Reason for surgery/procedure: cataracts  Diagnosis/reason for consult: cardiopulmonary clearance    The proposed surgical procedure is considered LOW risk.    REVISED CARDIAC RISK INDEX  The patient has the following serious cardiovascular risks for perioperative complications such as (MI, PE, VFib and 3  AV Block):  No serious cardiac risks  INTERPRETATION: 0 risks: Class I (very low risk - 0.4% complication rate)    The patient has the following additional risks for perioperative complications:  No identified additional risks  The 10-year ASCVD risk score (Marioviolette GAMA Jr, et al., 2013) is: 66.5%    Values used to calculate the score:      Age: 78 years      Sex: Female      Is Non- : No      Diabetic: Yes      Tobacco smoker: No      Systolic Blood Pressure: 186 mmHg      Is BP treated: Yes      HDL Cholesterol: 48 mg/dL      Total Cholesterol: 248 mg/dL       ICD-10-CM    1. Pre-op exam Z01.818 CBC with platelets     CBC with platelets   2. Hypothyroidism, unspecified type E03.9 TSH with free T4 reflex     TSH with free T4 reflex   3. Mixed hyperlipidemia E78.2 Comprehensive metabolic panel     CANCELED: Lipid Profile (Chol, Trig, HDL, LDL calc)   4. Type 2 diabetes mellitus without complication, without long-term current use of insulin (H) E11.9 Hemoglobin A1c   5. Preop general physical exam Z01.818        RECOMMENDATIONS:     --Patient is to take all scheduled medications on the day of surgery EXCEPT for modifications listed below.    APPROVAL GIVEN to proceed with proposed procedure, without further diagnostic evaluation       Signed Electronically by: Jennie Rubio MD    Copy of this evaluation report is provided to requesting physician.    [facility] Preop Guidelines    Revised Cardiac Risk Index

## 2018-05-11 ASSESSMENT — PATIENT HEALTH QUESTIONNAIRE - PHQ9: SUM OF ALL RESPONSES TO PHQ QUESTIONS 1-9: 0

## 2018-05-11 ASSESSMENT — ANXIETY QUESTIONNAIRES: GAD7 TOTAL SCORE: 0

## 2018-05-14 NOTE — H&P (VIEW-ONLY)
[facility] [place]  [address]  [place] [address]  [phone]  Dept: [phone]    PRE-OP EVALUATION:  Today's date: 5/10/2018    Bel Reardon (: 1940) presents for pre-operative evaluation assessment as requested by Dr. Haddad.  She requires evaluation and anesthesia risk assessment prior to undergoing surgery/procedure for treatment of cataracts .    Proposed Surgery/ Procedure: cataract removal  Date of Surgery/ Procedure: 18  Time of Surgery/ Procedure: [facility]  Hospital/Surgical Facility: [facility]  Fax number for surgical facility: unknown  Primary Physician: Jennie Rubio  Type of Anesthesia Anticipated: to be determined    Patient has a Health Care Directive or Living Will:  YES says there is one on file here at [facility]    1. NO - Do you have a history of heart attack, stroke, stent, bypass or surgery on an artery in the head, neck, heart or legs?  2. NO - Do you ever have any pain or discomfort in your chest?  3. NO - Do you have a history of  Heart Failure?  4. NO - Are you troubled by shortness of breath when: walking on the level, up a slight hill or at night?  5. NO - Do you currently have a cold, bronchitis or other respiratory infection?  6. NO - Do you have a cough, shortness of breath or wheezing?  7. NO - Do you sometimes get pains in the calves of your legs when you walk?  8. NO - Do you or anyone in your family have previous history of blood clots?  9. NO - Do you or does anyone in your family have a serious bleeding problem such as prolonged bleeding following surgeries or cuts?  10. NO - Have you ever had problems with anemia or been told to take iron pills?  11. NO - Have you had any abnormal blood loss such as black, tarry or bloody stools, or abnormal vaginal bleeding?  12. NO - Have you ever had a blood transfusion?  13. NO - Have you or any of your relatives ever had problems with anesthesia?  14. NO - Do you have sleep apnea, excessive snoring or daytime  drowsiness?  15. NO - Do you have any prosthetic heart valves?  16. NO - Do you have prosthetic joints?  17. NO - Is there any chance that you may be pregnant?      HPI:     HPI related to upcoming procedure: right cataract      See problem list for active medical problems.  Problems all longstanding and stable, except as noted/documented.  See ROS for pertinent symptoms related to these conditions.                                                                                                                                                          .    MEDICAL HISTORY:     Patient Active Problem List    Diagnosis Date Noted     Hypothyroidism, unspecified type 05/09/2018     Priority: Medium     Hives 09/11/2017     Priority: Medium     Advance care planning 06/28/2017     Priority: Medium     Advance Care Planning 6/28/2017: ACP Review of Chart / Resources Provided:  Reviewed chart for advance care plan.  Bel Reardon has an up to date advance care plan on file.  Added by Mayra Kim        Advance Care Planning 2/3/2016: Receipt of ACP document:  Received: Health Care Directive which was witnessed or notarized on 9/14/15.  Document previously scanned on 1/15/16.  Validation form completed and sent to be scanned.  Code Status needs to be updated to reflect choices in most recent ACP document. Confirmed/documented designated decision maker(s).  Added by Tamiko Tang             Combined forms of age-related cataract 06/28/2017     Priority: Medium     Chronic right-sided low back pain with right-sided sciatica 03/20/2017     Priority: Medium     Benign essential hypertension 11/16/2016     Priority: Medium     Type 2 diabetes mellitus without complication, without long-term current use of insulin (H) 11/11/2016     Priority: Medium     Postmenopausal bleeding 12/08/2015     Priority: Medium     Mixed hyperlipidemia 11/18/2015     Priority: Medium      Past Medical History:   Diagnosis Date      Agoraphobia with panic disorder 03/24/2011     Anxiety 03/24/2011     DM2 (diabetes mellitus, type 2) (H)      Epistaxis      Fibrocystic Breast Disease 03/24/2011     GERD 03/24/2011    Hx H. Pylori     Gout, unspecified 01/04/2011     Hypertension, Benign 03/24/2011     Lichen sclerosus     declines medication     Normal cardiac stress test 02/28/2013    Feb 2013 wnl     Pure hypercholesterolemia 01/13/2009     Past Surgical History:   Procedure Laterality Date     COLONOSCOPY N/A 8/21/2015    no further scopes needed.  Surgeon: Yohan Licona DO;  Location: [facility] OR     DILATION AND CURETTAGE, OPERATIVE HYSTEROSCOPY, COMBINED N/A 1/13/2016    Procedure: COMBINED DILATION AND CURETTAGE, OPERATIVE HYSTEROSCOPY;  Surgeon: Seven Kern MD;  Location: [facility] OR     left shoulder  1990    nerve damage  s/p trauma; from repetitious work     lumpectomy left breast  1990    benign -- fibroadenoma-left; Facility; [facility]     PHACOEMULSIFICATION WITH STANDARD INTRAOCULAR LENS IMPLANT Left 11/11/2014    Procedure: PHACOEMULSIFICATION WITH STANDARD INTRAOCULAR LENS IMPLANT;  Surgeon: Bobby Arambula MD;  Location: [facility] OR     Current Outpatient Prescriptions   Medication Sig Dispense Refill     allopurinol (ZYLOPRIM) 100 MG tablet TAKE ONE & ONE-HALF TABLETS BY MOUTH DAILY 135 tablet 1     blood glucose monitoring (ONE TOUCH DELICA) lancets Use to test blood sugar 1 times daily or as directed. 3 each 11     blood glucose monitoring (ONE TOUCH ULTRA MINI) meter device kit 1 kit by In [employer] route daily Use to test blood sugar 1 times daily or as directed. 1 kit 0     hydrocortisone 2.5 % cream Apply topically 2 times daily 30 g 0     levothyroxine (SYNTHROID/LEVOTHROID) 25 MCG tablet TAKE 1 TABLET BY MOUTH EVERY DAY 90 tablet 3     metFORMIN (GLUCOPHAGE) 500 MG tablet TAKE 1 TABLET BY MOUTH WITH BREAKFAST AND 1 TABLET WITH SUPPER 60 tablet 2     Omega-3 Fatty Acids (FISH OIL) 1000 MG CPDR Take 1 capsule by mouth daily       ONE  TOUCH ULTRA test strip TEST BLOOD SURGARS ONCE A  each 3     valsartan (DIOVAN) 80 MG tablet TAKE ONE TABLET TWO TIMES A DAY BY MOUTH - GENERIC FOR DIOVAN 180 tablet 3     fenofibrate 145 MG tablet Take 1 tablet (145 mg) by mouth daily (Patient not taking: Reported on 4/27/2018) 90 tablet 0     OTC products: None, except as noted above    Allergies   Allergen Reactions     Aspirin      Tightness in chest       Erythromycin Base [Kdc:Yellow Dye+Erythromycin+Brilliant Blue Fcf]      Ezetimibe Other (See Comments)     Aches; Zetia       Fenofibrate Micronized [Fenofibrate]      Aches; Tricor     Levofloxacin      Flu-like symptoms.     Lisinopril Other (See Comments)     headache     No Clinical Screening - See Comments      Fenofibrate nanocrystallized; Aches- Tricor     Pravastatin Sodium Other (See Comments)     Myalgia; Pravachol     Dexamethasone Rash     Maxidex     Dexamethasone Sod Phosphate [Dexamethasone Sodium Phosphate] Rash     Maxidex     Niacin Rash     Rosuvastatin Calcium Rash     crestor        Latex Allergy: NO    Social History   Substance Use Topics     Smoking status: Former Smoker     Years: 14.00     Types: Cigarettes     Smokeless tobacco: Never Used      Comment: 20.00 per day; quit 1991     Alcohol use 0.0 oz/week     0 Standard drinks or equivalent per week      Comment: 1 beer weekly     History   Drug Use No       REVIEW OF SYSTEMS:   CONSTITUTIONAL: NEGATIVE for fever, chills, change in weight  INTEGUMENTARY/SKIN: NEGATIVE for worrisome rashes, moles or lesions  EYES: NEGATIVE for vision changes or irritation  ENT/MOUTH: NEGATIVE for ear, mouth and throat problems  RESP: NEGATIVE for significant cough or SOB  BREAST: NEGATIVE for masses, tenderness or discharge  CV: NEGATIVE for chest pain, palpitations or peripheral edema  GI: NEGATIVE for nausea, abdominal pain, heartburn, or change in bowel habits  : NEGATIVE for frequency, dysuria, or hematuria  MUSCULOSKELETAL: NEGATIVE for  significant arthralgias or myalgia  NEURO: NEGATIVE for weakness, dizziness or paresthesias  ENDOCRINE: NEGATIVE for temperature intolerance, skin/hair changes  HEME: NEGATIVE for bleeding problems  PSYCHIATRIC: NEGATIVE for changes in mood or affect    EXAM:   /82  Pulse 59  Temp 97.1  F (36.2  C) (Tympanic)  Wt 175 lb 4 oz (79.5 kg)  SpO2 96%  BMI 35.7 kg/m2   Repeat /86    GENERAL APPEARANCE: healthy, alert and no distress     EYES: EOMI, PERRL     HENT: ear canals and TM's normal and nose and mouth without ulcers or lesions     NECK: no adenopathy, no asymmetry, masses, or scars and thyroid normal to palpation     RESP: lungs clear to auscultation - no rales, rhonchi or wheezes     CV: regular rates and rhythm, normal S1 S2, no S3 or S4 and no murmur, click or rub     ABDOMEN:  soft, nontender, no HSM or masses and bowel sounds normal     MS: extremities normal- no gross deformities noted, no evidence of inflammation in joints, FROM in all extremities.     SKIN: no suspicious lesions or rashes     NEURO: Normal strength and tone, sensory exam grossly normal, mentation intact and speech normal     PSYCH: mentation appears normal. and affect normal/bright     LYMPHATICS: No cervical adenopathy    DIAGNOSTICS:     Labs Resulted Today:   Results for orders placed or performed in visit on 05/10/18   TSH with free T4 reflex   Result Value Ref Range    TSH 2.54 0.40 - 4.00 mU/L   CBC with platelets   Result Value Ref Range    WBC 6.0 4.0 - 11.0 10e9/L    RBC Count 4.83 3.8 - 5.2 10e12/L    Hemoglobin 15.0 11.7 - 15.7 g/dL    Hematocrit 42.8 35.0 - 47.0 %    MCV 89 78 - 100 fl    MCH 31.1 26.5 - 33.0 pg    MCHC 35.0 31.5 - 36.5 g/dL    RDW 13.3 10.0 - 15.0 %    Platelet Count 185 150 - 450 10e9/L   Comprehensive metabolic panel   Result Value Ref Range    Sodium 138 133 - 144 mmol/L    Potassium 4.1 3.4 - 5.3 mmol/L    Chloride 106 94 - 109 mmol/L    Carbon Dioxide 22 20 - 32 mmol/L    Anion Gap 10 3 -  14 mmol/L    Glucose 212 (H) 70 - 99 mg/dL    Urea Nitrogen 12 7 - 30 mg/dL    Creatinine 0.69 0.52 - 1.04 mg/dL    GFR Estimate 83 >60 mL/min/1.7m2    GFR Estimate If Black >90 >60 mL/min/1.7m2    Calcium 8.8 8.5 - 10.1 mg/dL    Bilirubin Total 0.4 0.2 - 1.3 mg/dL    Albumin 3.7 3.4 - 5.0 g/dL    Protein Total 7.5 6.8 - 8.8 g/dL    Alkaline Phosphatase 66 40 - 150 U/L    ALT 71 (H) 0 - 50 U/L    AST 57 (H) 0 - 45 U/L       Recent Labs   Lab Test  05/10/18   0907  02/15/18   0822  11/01/17   0831  08/20/17   1132   03/20/17   0834   08/13/14   0946  08/11/14   0520   HGB  15.0   --    --   15.3   --    --    < >  13.5  15.2   PLT  185   --    --   192   --    --    < >  213  187   INR   --    --    --    --    --    --    --   1.18  1.08   NA   --    --    --   140   --   142   < >  136   --    POTASSIUM   --    --    --   3.7   --   4.4   < >  3.6   --    CR   --    --    --   0.65   --   0.83   < >  0.76   --    A1C   --   6.8*  6.9*   --    < >  6.5*   < >   --    --     < > = values in this interval not displayed.        IMPRESSION:   Reason for surgery/procedure: cataracts  Diagnosis/reason for consult: cardiopulmonary clearance    The proposed surgical procedure is considered LOW risk.    REVISED CARDIAC RISK INDEX  The patient has the following serious cardiovascular risks for perioperative complications such as (MI, PE, VFib and 3  AV Block):  No serious cardiac risks  INTERPRETATION: 0 risks: Class I (very low risk - 0.4% complication rate)    The patient has the following additional risks for perioperative complications:  No identified additional risks  The 10-year ASCVD risk score (Marioviolette GAMA Jr, et al., 2013) is: 66.5%    Values used to calculate the score:      Age: 78 years      Sex: Female      Is Non- : No      Diabetic: Yes      Tobacco smoker: No      Systolic Blood Pressure: 186 mmHg      Is BP treated: Yes      HDL Cholesterol: 48 mg/dL      Total Cholesterol: 248 mg/dL       ICD-10-CM    1. Pre-op exam Z01.818 CBC with platelets     CBC with platelets   2. Hypothyroidism, unspecified type E03.9 TSH with free T4 reflex     TSH with free T4 reflex   3. Mixed hyperlipidemia E78.2 Comprehensive metabolic panel     CANCELED: Lipid Profile (Chol, Trig, HDL, LDL calc)   4. Type 2 diabetes mellitus without complication, without long-term current use of insulin (H) E11.9 Hemoglobin A1c   5. Preop general physical exam Z01.818        RECOMMENDATIONS:     --Patient is to take all scheduled medications on the day of surgery EXCEPT for modifications listed below.    APPROVAL GIVEN to proceed with proposed procedure, without further diagnostic evaluation       Signed Electronically by: Jennie Rubio MD    Copy of this evaluation report is provided to requesting physician.    [facility] Preop Guidelines    Revised Cardiac Risk Index

## 2018-05-21 ENCOUNTER — ANESTHESIA EVENT (OUTPATIENT)
Dept: SURGERY | Facility: HOSPITAL | Age: 78
End: 2018-05-21
Payer: MEDICARE

## 2018-05-21 RX ORDER — SODIUM CHLORIDE, SODIUM LACTATE, POTASSIUM CHLORIDE, CALCIUM CHLORIDE 600; 310; 30; 20 MG/100ML; MG/100ML; MG/100ML; MG/100ML
INJECTION, SOLUTION INTRAVENOUS CONTINUOUS
Status: CANCELLED | OUTPATIENT
Start: 2018-05-21

## 2018-05-21 RX ORDER — ONDANSETRON 2 MG/ML
4 INJECTION INTRAMUSCULAR; INTRAVENOUS EVERY 30 MIN PRN
Status: CANCELLED | OUTPATIENT
Start: 2018-05-21

## 2018-05-21 RX ORDER — NALOXONE HYDROCHLORIDE 0.4 MG/ML
.1-.4 INJECTION, SOLUTION INTRAMUSCULAR; INTRAVENOUS; SUBCUTANEOUS
Status: CANCELLED | OUTPATIENT
Start: 2018-05-21 | End: 2018-05-22

## 2018-05-21 RX ORDER — MEPERIDINE HYDROCHLORIDE 25 MG/ML
12.5 INJECTION INTRAMUSCULAR; INTRAVENOUS; SUBCUTANEOUS
Status: CANCELLED | OUTPATIENT
Start: 2018-05-21

## 2018-05-21 RX ORDER — ONDANSETRON 4 MG/1
4 TABLET, ORALLY DISINTEGRATING ORAL EVERY 30 MIN PRN
Status: CANCELLED | OUTPATIENT
Start: 2018-05-21

## 2018-05-21 ASSESSMENT — LIFESTYLE VARIABLES: TOBACCO_USE: 1

## 2018-05-21 NOTE — ANESTHESIA PREPROCEDURE EVALUATION
Anesthesia Evaluation     . Pt has had prior anesthetic.     No history of anesthetic complications          ROS/MED HX    ENT/Pulmonary:     (+)STEPHANIE risk factors hypertension, obese, tobacco use, Past use asthma , . .    Neurologic:  - neg neurologic ROS     Cardiovascular:     (+) Dyslipidemia, hypertension-range: not on beta blocker, ---. : . . . :. . Previous cardiac testing date:results:Stress Testdate:2/13 results:normalECG reviewed date:1/16 results:SB date: results:          METS/Exercise Tolerance:     Hematologic:  - neg hematologic  ROS       Musculoskeletal:   (+) arthritis, , , other musculoskeletal- chronic lower back pain      GI/Hepatic:     (+) GERD       Renal/Genitourinary:  - ROS Renal section negative       Endo:     (+) type II DM Last HgA1c: 7.4 date: 5/18 thyroid problem hypothyroidism, Obesity, .      Psychiatric:     (+) psychiatric history anxiety (agoraphobia with panic d/o)      Infectious Disease:  - neg infectious disease ROS       Malignancy:      - no malignancy   Other:                     Physical Exam  Normal systems: cardiovascular, pulmonary and dental    Airway   Mallampati: II  TM distance: >3 FB  Neck ROM: full    Dental     Cardiovascular   Rhythm and rate: regular and normal      Pulmonary    breath sounds clear to auscultation                    Anesthesia Plan      History & Physical Review  History and physical reviewed and following examination; no interval change.    ASA Status:  3 .    NPO Status:  > 4 hours    Plan for MAC Maintenance will be TIVA.  Reason for MAC:  Chronic cardiopulmonary disease (G9) and Procedure to face, neck, head or breast    Risks and benefits of MAC anesthetic discussed including dental damage, aspiration, loss of airway, conversion to general anesthetic, CV complications, MI, stroke, death. Pt wishes to proceed.   Need to treat BP first, if sbp<180 after meds then can proceed, BP now 181 systolic, OK to proceed per Dr Haddad       Postoperative Care      Consents  Anesthetic plan, risks, benefits and alternatives discussed with:  Patient..                          .

## 2018-05-22 ENCOUNTER — HOSPITAL ENCOUNTER (OUTPATIENT)
Facility: HOSPITAL | Age: 78
Discharge: HOME | End: 2018-05-22
Attending: OPHTHALMOLOGY | Admitting: OPHTHALMOLOGY
Payer: MEDICARE

## 2018-05-22 ENCOUNTER — SURGERY (OUTPATIENT)
Age: 78
End: 2018-05-22

## 2018-05-22 ENCOUNTER — ANESTHESIA (OUTPATIENT)
Dept: SURGERY | Facility: HOSPITAL | Age: 78
End: 2018-05-22
Payer: MEDICARE

## 2018-05-22 VITALS
TEMPERATURE: 98.1 F | HEIGHT: 60 IN | OXYGEN SATURATION: 94 % | BODY MASS INDEX: 33.92 KG/M2 | WEIGHT: 172.8 LBS | DIASTOLIC BLOOD PRESSURE: 66 MMHG | RESPIRATION RATE: 16 BRPM | SYSTOLIC BLOOD PRESSURE: 127 MMHG

## 2018-05-22 PROCEDURE — 66984 XCAPSL CTRC RMVL W/O ECP: CPT | Performed by: NURSE ANESTHETIST, CERTIFIED REGISTERED

## 2018-05-22 PROCEDURE — V2632 POST CHMBR INTRAOCULAR LENS: HCPCS | Performed by: OPHTHALMOLOGY

## 2018-05-22 PROCEDURE — 40000306 ZZH STATISTIC PRE PROC ASSESS II: Performed by: OPHTHALMOLOGY

## 2018-05-22 PROCEDURE — 25000128 H RX IP 250 OP 636: Performed by: NURSE ANESTHETIST, CERTIFIED REGISTERED

## 2018-05-22 PROCEDURE — 25000132 ZZH RX MED GY IP 250 OP 250 PS 637: Mod: GY | Performed by: OPHTHALMOLOGY

## 2018-05-22 PROCEDURE — 25000128 H RX IP 250 OP 636: Performed by: OPHTHALMOLOGY

## 2018-05-22 PROCEDURE — 27210794 ZZH OR GENERAL SUPPLY STERILE: Performed by: OPHTHALMOLOGY

## 2018-05-22 PROCEDURE — 37000008 ZZH ANESTHESIA TECHNICAL FEE, 1ST 30 MIN: Performed by: OPHTHALMOLOGY

## 2018-05-22 PROCEDURE — 71000027 ZZH RECOVERY PHASE 2 EACH 15 MINS: Performed by: OPHTHALMOLOGY

## 2018-05-22 PROCEDURE — 36000056 ZZH SURGERY LEVEL 3 1ST 30 MIN: Performed by: OPHTHALMOLOGY

## 2018-05-22 PROCEDURE — 25000125 ZZHC RX 250: Performed by: OPHTHALMOLOGY

## 2018-05-22 PROCEDURE — 99100 ANES PT EXTEME AGE<1 YR&>70: CPT | Performed by: NURSE ANESTHETIST, CERTIFIED REGISTERED

## 2018-05-22 PROCEDURE — A9270 NON-COVERED ITEM OR SERVICE: HCPCS | Mod: GY | Performed by: OPHTHALMOLOGY

## 2018-05-22 DEVICE — LENS-SOFPORT 23.5: Type: IMPLANTABLE DEVICE | Site: EYE | Status: FUNCTIONAL

## 2018-05-22 RX ORDER — PROPARACAINE HYDROCHLORIDE 5 MG/ML
1 SOLUTION/ DROPS OPHTHALMIC ONCE
Status: COMPLETED | OUTPATIENT
Start: 2018-05-22 | End: 2018-05-22

## 2018-05-22 RX ORDER — CYCLOPENTOLATE HYDROCHLORIDE 20 MG/ML
1 SOLUTION/ DROPS OPHTHALMIC
Status: COMPLETED | OUTPATIENT
Start: 2018-05-22 | End: 2018-05-22

## 2018-05-22 RX ORDER — PHENYLEPHRINE HYDROCHLORIDE 100 MG/ML
1 SOLUTION/ DROPS OPHTHALMIC
Status: DISCONTINUED | OUTPATIENT
Start: 2018-05-22 | End: 2018-05-22 | Stop reason: HOSPADM

## 2018-05-22 RX ORDER — LIDOCAINE HYDROCHLORIDE 10 MG/ML
INJECTION, SOLUTION EPIDURAL; INFILTRATION; INTRACAUDAL; PERINEURAL PRN
Status: DISCONTINUED | OUTPATIENT
Start: 2018-05-22 | End: 2018-05-22 | Stop reason: HOSPADM

## 2018-05-22 RX ORDER — TETRACAINE HYDROCHLORIDE 5 MG/ML
SOLUTION OPHTHALMIC PRN
Status: DISCONTINUED | OUTPATIENT
Start: 2018-05-22 | End: 2018-05-22 | Stop reason: HOSPADM

## 2018-05-22 RX ORDER — LEVOBUNOLOL HYDROCHLORIDE 5 MG/ML
SOLUTION/ DROPS OPHTHALMIC PRN
Status: DISCONTINUED | OUTPATIENT
Start: 2018-05-22 | End: 2018-05-22 | Stop reason: HOSPADM

## 2018-05-22 RX ORDER — PHENYLEPHRINE HYDROCHLORIDE 25 MG/ML
1 SOLUTION/ DROPS OPHTHALMIC
Status: COMPLETED | OUTPATIENT
Start: 2018-05-22 | End: 2018-05-22

## 2018-05-22 RX ORDER — PILOCARPINE HYDROCHLORIDE 10 MG/ML
SOLUTION/ DROPS OPHTHALMIC PRN
Status: DISCONTINUED | OUTPATIENT
Start: 2018-05-22 | End: 2018-05-22 | Stop reason: HOSPADM

## 2018-05-22 RX ORDER — ACETAMINOPHEN 325 MG/1
650 TABLET ORAL ONCE
Status: COMPLETED | OUTPATIENT
Start: 2018-05-22 | End: 2018-05-22

## 2018-05-22 RX ORDER — LIDOCAINE 40 MG/G
CREAM TOPICAL
Status: DISCONTINUED | OUTPATIENT
Start: 2018-05-22 | End: 2018-05-22 | Stop reason: HOSPADM

## 2018-05-22 RX ORDER — HYDRALAZINE HYDROCHLORIDE 20 MG/ML
10 INJECTION INTRAMUSCULAR; INTRAVENOUS ONCE
Status: COMPLETED | OUTPATIENT
Start: 2018-05-22 | End: 2018-05-22

## 2018-05-22 RX ADMIN — CYCLOPENTOLATE HYDROCHLORIDE 1 DROP: 20 SOLUTION/ DROPS OPHTHALMIC at 10:40

## 2018-05-22 RX ADMIN — EPINEPHRINE 500 ML: 1 INJECTION, SOLUTION INTRAMUSCULAR; SUBCUTANEOUS at 12:48

## 2018-05-22 RX ADMIN — LIDOCAINE HYDROCHLORIDE 0.5 ML: 20 JELLY TOPICAL at 10:51

## 2018-05-22 RX ADMIN — LEVOBUNOLOL HYDROCHLORIDE 1 DROP: 5 SOLUTION/ DROPS OPHTHALMIC at 12:38

## 2018-05-22 RX ADMIN — MIDAZOLAM 1 MG: 1 INJECTION INTRAMUSCULAR; INTRAVENOUS at 12:23

## 2018-05-22 RX ADMIN — Medication 0.8 ML: at 12:49

## 2018-05-22 RX ADMIN — MIDAZOLAM 1 MG: 1 INJECTION INTRAMUSCULAR; INTRAVENOUS at 12:34

## 2018-05-22 RX ADMIN — ACETAMINOPHEN 650 MG: 325 TABLET ORAL at 10:03

## 2018-05-22 RX ADMIN — PHENYLEPHRINE HYDROCHLORIDE 1 DROP: 25 SOLUTION/ DROPS OPHTHALMIC at 10:48

## 2018-05-22 RX ADMIN — HYDRALAZINE HYDROCHLORIDE 10 MG: 20 INJECTION INTRAMUSCULAR; INTRAVENOUS at 10:30

## 2018-05-22 RX ADMIN — LIDOCAINE HYDROCHLORIDE 1 ML: 10 INJECTION, SOLUTION EPIDURAL; INFILTRATION; INTRACAUDAL; PERINEURAL at 12:39

## 2018-05-22 RX ADMIN — PHENYLEPHRINE HYDROCHLORIDE 1 DROP: 25 SOLUTION/ DROPS OPHTHALMIC at 10:41

## 2018-05-22 RX ADMIN — PILOCARPINE HYDROCHLORIDE 1 DROP: 10 SOLUTION/ DROPS OPHTHALMIC at 12:49

## 2018-05-22 RX ADMIN — TETRACAINE HYDROCHLORIDE 2 DROP: 5 SOLUTION OPHTHALMIC at 12:50

## 2018-05-22 RX ADMIN — CYCLOPENTOLATE HYDROCHLORIDE 1 DROP: 20 SOLUTION/ DROPS OPHTHALMIC at 10:47

## 2018-05-22 RX ADMIN — PROPARACAINE HYDROCHLORIDE 1 DROP: 5 SOLUTION/ DROPS OPHTHALMIC at 10:40

## 2018-05-22 RX ADMIN — HYDRALAZINE HYDROCHLORIDE 10 MG: 20 INJECTION INTRAMUSCULAR; INTRAVENOUS at 10:57

## 2018-05-22 NOTE — OR NURSING
Patient and responsible adult given discharge instructions with no questions regarding instructions. Concha score 20. Pain level 0/10.  Discharged from unit via walking. Patient discharged to home with friend william  .

## 2018-05-22 NOTE — IP AVS SNAPSHOT
[facility] II    [address] [address]    Phone:  [phone]                                       After Visit Summary   5/22/2018    Bel Reardon    MRN: [id number]           After Visit Summary Signature Page     I have received my discharge instructions, and my questions have been answered. I have discussed any challenges I see with this plan with the nurse or doctor.    ..........................................................................................................................................  Patient/Patient Representative Signature      ..........................................................................................................................................  Patient Representative Print Name and Relationship to Patient    ..................................................               ................................................  Date                                            Time    ..........................................................................................................................................  Reviewed by Signature/Title    ...................................................              ..............................................  Date                                                            Time

## 2018-05-22 NOTE — ANESTHESIA POSTPROCEDURE EVALUATION
Patient: Bel Reardon    Procedure(s):  PHACOEMULSIFICATION CATARACT EXTRACTION POSTERIOR CHAMBER LENS RIGHT - Wound Class: I-Clean    Diagnosis:COMBINED CATARACT RIGHT  Diagnosis Additional Information: No value filed.    Anesthesia Type:  MAC    Note:  Anesthesia Post Evaluation    Patient location during evaluation: Bedside  Patient participation: Able to fully participate in evaluation  Level of consciousness: awake and alert  Pain management: adequate  Airway patency: patent  Cardiovascular status: acceptable  Respiratory status: acceptable  Hydration status: acceptable  PONV: none     Anesthetic complications: None          Last vitals:  Vitals:    05/22/18 1310 05/22/18 1315 05/22/18 1320   BP: 131/78 127/66    Resp:      Temp:  98.1  F (36.7  C)    SpO2: 94% 94% 94%         Electronically Signed By: JOSEPH Gómez CRNA  May 22, 2018  2:15 PM

## 2018-05-22 NOTE — IP AVS SNAPSHOT
MRN:[id number]                      After Visit Summary   5/22/2018    Bel Reardon    MRN: [id number]           Thank you!     Thank you for choosing [facility] for your care. Our goal is always to provide you with excellent care. Hearing back from our patients is one way we can continue to improve our services. Please take a few minutes to complete the written survey that you may receive in the mail after you visit with us. Thank you!        Patient Information     Date Of Birth          1940        About your hospital stay     You were admitted on:  May 22, 2018 You last received care in the:  [facility]    You were discharged on:  May 22, 2018        Reason for your hospital stay       Cataract surgery right eye done.                  Who to Call     For medical emergencies, please call 911.  For non-urgent questions about your medical care, please call your primary care provider or clinic, [phone]  For questions related to your surgery, please call your surgery clinic        Attending Provider     Provider Specialty    Bert Haddad MD Ophthalmology       Primary Care Provider Office Phone # Fax #    Jennie Rubio [phone]      After Care Instructions     Nursing Communication 1       Eyedrops, shield, and activities per post op pamphlet.            Patient care order       Patient has Polytrim (Polymyxin/trimethoprim) and Tobrex (tobramycin)  and Durezol and Ilevro drops at home.  These should be used:  Polytrim 1 drop operative eye QID for 1 week.  Polytrim 1 drop operative eye QID for 1 week.Tobramycin 1 drop operative eye QID for 1 week.  Durezol 1 drop operative eye BID for 4 weeks.  Ilevro 1 drop operative eye QD for 6 weeks.                  Follow-up Appointments     Follow-up and recommended labs and tests       Follow up tomorrow at the [facility].                  Your next 10 appointments already scheduled     Jun 13, 2018  8:30  AM CDT   (Arrive by 8:15 AM)   SHORT with Jennie Rubio MD   [facility] ([facility] )    [address] [place] [id number] [phone]            Nov 12, 2018 10:30 AM CST   (Arrive by 10:15 AM)   Return Visit with Kristal Pagan RD   [facility] ([facility] )    [address] [place] [id number] [phone]              Further instructions from your care team           Post-Anesthesia Patient Instructions    IMMEDIATELY FOLLOWING SURGERY:  Do not drive or operate machinery for the first twenty four hours after surgery.  Do not make any important decisions for twenty four hours after surgery or while taking narcotic pain medications or sedatives.  If you develop intractable nausea and vomiting or a severe headache please notify your doctor immediately.    FOLLOW-UP:  Please make an appointment with your surgeon as instructed. You do not need to follow up with anesthesia unless specifically instructed to do so.    WOUND CARE INSTRUCTIONS (if applicable):  Keep a dry clean dressing on the anesthesia/puncture wound site if there is drainage.  Once the wound has quit draining you may leave it open to air.  Generally you should leave the bandage intact for twenty four hours unless there is drainage.  If the epidural site drains for more than 36-48 hours please call the anesthesia department.    QUESTIONS?:  Please feel free to call your physician or the hospital  if you have any questions, and they will be happy to assist you.       Pending Results     No orders found from 5/20/2018 to 5/23/2018.            Admission Information     Date & Time Provider Department Dept. Phone    5/22/2018 Bert Haddad MD [facility] Pre[facility]/Phase [phone]      Your Vitals Were     Blood Pressure Respirations Height Weight Pulse Oximetry BMI (Body Mass Index)    145/73 16 1.524 m (5') 78.4 kg (172 lb 12.8 oz) 94% 33.75 kg/m2      MyChart Information   "   ffk environment lets you send messages to your doctor, view your test results, renew your prescriptions, schedule appointments and more. To sign up, go to [email or web address] . Click on \"Log in\" on the left side of the screen, which will take you to the Welcome page. Then click on \"Sign up Now\" on the right side of the page.     You will be asked to enter the access code listed below, as well as some personal information. Please follow the directions to create your username and password.     Your access code is: [email or web address]  Expires: 2018  1:03 PM     Your access code will  in 90 days. If you need help or a new code, please call your [facility] clinic or [phone].        Care EveryWhere ID     This is your Care EveryWhere ID. This could be used by other organizations to access your [facility] medical records  [id number]        Equal Access to Services     SANDOR ESTRELLA : Hadkathie Costa, waching blum, qamani kaalmagrayson umaña, gemini betancourt . So [facility] [phone].    ATENCIÓN: Si lynn golden, tiene a guaman disposición servicios gratuitos de asistencia lingüística. Llame al [phone].    We comply with applicable federal civil rights laws and [place] laws. We do not discriminate on the basis of race, color, national origin, age, disability, sex, sexual orientation, or gender identity.               Review of your medicines      CONTINUE these medicines which have NOT CHANGED        Dose / Directions    allopurinol 100 MG tablet   Commonly known as:  ZYLOPRIM   Used for:  Idiopathic gout, unspecified chronicity, unspecified site        TAKE ONE & ONE-HALF TABLETS BY MOUTH DAILY   Quantity:  135 tablet   Refills:  1       blood glucose monitoring lancets   Used for:  Type 2 diabetes mellitus without complication, without long-term current use of insulin (H)        Use to test blood sugar 1 times daily or as directed.   Quantity:  3 each   Refills:  11       " blood glucose monitoring meter device kit   Used for:  Type 2 diabetes mellitus without complication, without long-term current use of insulin (H)        Dose:  1 kit   1 kit by In Vitro route daily Use to test blood sugar 1 times daily or as directed.   Quantity:  1 kit   Refills:  0       fenofibrate 145 MG tablet   Used for:  Mixed hyperlipidemia        Dose:  145 mg   Take 1 tablet (145 mg) by mouth daily   Quantity:  90 tablet   Refills:  0       Fish Oil 1000 MG Cpdr        Dose:  1 capsule   Take 1 capsule by mouth daily   Refills:  0       hydrocortisone 2.5 % cream   Used for:  Hives        Apply topically 2 times daily   Quantity:  30 g   Refills:  0       levothyroxine 25 MCG tablet   Commonly known as:  SYNTHROID/LEVOTHROID   Used for:  Other specified hypothyroidism        TAKE 1 TABLET BY MOUTH EVERY DAY   Quantity:  90 tablet   Refills:  3       metFORMIN 500 MG tablet   Commonly known as:  GLUCOPHAGE   Used for:  Type 2 diabetes mellitus without complication, without long-term current use of insulin (H)        TAKE 1 TABLET BY MOUTH WITH BREAKFAST AND 1 TABLET WITH SUPPER   Quantity:  60 tablet   Refills:  2       ONETOUCH ULTRA test strip   Used for:  Type 2 diabetes mellitus without complication (H)   Generic drug:  blood glucose monitoring        TEST BLOOD SURGARS ONCE A DAY   Quantity:  100 each   Refills:  3       valsartan 160 MG tablet   Commonly known as:  DIOVAN   Used for:  Benign essential HTN        Dose:  160 mg   Take 1 tablet (160 mg) by mouth 2 times daily   Quantity:  60 tablet   Refills:  2                Protect others around you: Learn how to safely use, store and throw away your medicines at www.disposemymeds.org.             Medication List: This is a list of all your medications and when to take them. Check marks below indicate your daily home schedule. Keep this list as a reference.      Medications           Morning Afternoon Evening Bedtime As Needed    allopurinol 100 MG  tablet   Commonly known as:  ZYLOPRIM   TAKE ONE & ONE-HALF TABLETS BY MOUTH DAILY                                blood glucose monitoring lancets   Use to test blood sugar 1 times daily or as directed.                                blood glucose monitoring meter device kit   1 kit by In Vitro route daily Use to test blood sugar 1 times daily or as directed.                                fenofibrate 145 MG tablet   Take 1 tablet (145 mg) by mouth daily                                Fish Oil 1000 MG Cpdr   Take 1 capsule by mouth daily                                hydrocortisone 2.5 % cream   Apply topically 2 times daily                                levothyroxine 25 MCG tablet   Commonly known as:  SYNTHROID/LEVOTHROID   TAKE 1 TABLET BY MOUTH EVERY DAY                                metFORMIN 500 MG tablet   Commonly known as:  GLUCOPHAGE   TAKE 1 TABLET BY MOUTH WITH BREAKFAST AND 1 TABLET WITH SUPPER                                ONETOUCH ULTRA test strip   TEST BLOOD SURGARS ONCE A DAY   Generic drug:  blood glucose monitoring                                valsartan 160 MG tablet   Commonly known as:  DIOVAN   Take 1 tablet (160 mg) by mouth 2 times daily

## 2018-05-22 NOTE — ANESTHESIA CARE TRANSFER NOTE
Patient: Bel Reardon    Procedure(s):  PHACOEMULSIFICATION CATARACT EXTRACTION POSTERIOR CHAMBER LENS RIGHT - Wound Class: I-Clean    Diagnosis: COMBINED CATARACT RIGHT  Diagnosis Additional Information: No value filed.    Anesthesia Type:   MAC     Note:  Airway :Room Air  Patient transferred to:Phase II  Handoff Report: Identifed the Patient, Identified the Reponsible Provider, Reviewed the pertinent medical history, Discussed the surgical course, Reviewed Intra-OP anesthesia mangement and issues during anesthesia, Set expectations for post-procedure period and Allowed opportunity for questions and acknowledgement of understanding      Vitals: (Last set prior to Anesthesia Care Transfer)    CRNA VITALS  5/22/2018 1213 - 5/22/2018 1246      5/22/2018             Pulse: 116    Ht Rate: 115    SpO2: 98 %                Electronically Signed By: JOSEPH Gómez CRNA  May 22, 2018  12:46 PM

## 2018-05-22 NOTE — OP NOTE
[facility]  Ophthalmology Full Operative Note    Pre-operative diagnosis: COMBINED CATARACT RIGHT   Post-operative diagnosis Same   Procedure: Procedure(s):  PHACOEMULSIFICATION CATARACT EXTRACTION POSTERIOR CHAMBER LENS RIGHT - Wound Class: I-Clean   Surgeon: Bert Haddad MD   Assistants(s):    Anesthesia: Combined MAC with Topical    Estimated blood loss: None    Total IV fluids: (See anesthesia record)   Specimens: None   Implants: 23.5 LI61AO   Findings:    Complications: None   Condition: Stable   Comments:       PROCEDURAL ANESTHESIA:     Topical/MAC with IV sedation for hypertension.  Lidocaine 2% jelly topically and Lidocaine 1% preservative-free intracamerally.    PROCEDURE:  The patient was brought to the Operating Room and prepped and draped in a sterile manner.  A wire lid speculum was placed.  A paracentesis was created and 1% Lidocaine was instilled in the anterior chamber.  The anterior chamber was then filled with Amvisc viscoelastic.  A clear cornea temporal wound was created using a 2.8 mm keratome.  A cystotome was used to initiate a flap in the anterior capsule and a Utrata forceps was used to create a continuous tear capsulorhexis.  Hydrodissection was performed.  The phacoemulsification tip was inserted into the eye and the nucleus and epinucleus were removed using a divide and conquer technique.  The residual cortex was removed using the I/A handpiece.  The anterior chamber was then refilled with viscoelastic and the wound was enlarged with the keratome.  The intraocular lens, 23.5 diopter, Model LI61AO, was placed into the injector and injected into the capsular bag. It was checked to make sure that it was central and stable.  Residual viscoelastic was removed using the I/A.  The anterior chamber was refilled with BSS.  The wounds were hydrated with BSS and were noted to be watertight with no suture necessary.  Topical pilocarpine 1%, Betagan, and Polytrim was applied.  A hard  shield was placed.     The patient tolerated the procedure well and was sent to the Recovery Room in satisfactory condition.

## 2018-06-13 ENCOUNTER — HOSPITAL ENCOUNTER (EMERGENCY)
Facility: HOSPITAL | Age: 78
Discharge: HOME | End: 2018-06-13
Attending: FAMILY MEDICINE | Admitting: FAMILY MEDICINE
Payer: MEDICARE

## 2018-06-13 ENCOUNTER — APPOINTMENT (OUTPATIENT)
Dept: LAB | Facility: OTHER | Age: 78
End: 2018-06-13
Attending: FAMILY MEDICINE
Payer: MEDICARE

## 2018-06-13 ENCOUNTER — OFFICE VISIT (OUTPATIENT)
Dept: FAMILY MEDICINE | Facility: OTHER | Age: 78
End: 2018-06-13
Attending: FAMILY MEDICINE
Payer: MEDICARE

## 2018-06-13 VITALS
DIASTOLIC BLOOD PRESSURE: 102 MMHG | BODY MASS INDEX: 32.02 KG/M2 | TEMPERATURE: 98.7 F | RESPIRATION RATE: 15 BRPM | HEART RATE: 107 BPM | WEIGHT: 174 LBS | OXYGEN SATURATION: 93 % | HEIGHT: 62 IN | SYSTOLIC BLOOD PRESSURE: 193 MMHG

## 2018-06-13 VITALS
DIASTOLIC BLOOD PRESSURE: 106 MMHG | BODY MASS INDEX: 33.98 KG/M2 | SYSTOLIC BLOOD PRESSURE: 220 MMHG | OXYGEN SATURATION: 96 % | HEART RATE: 62 BPM | WEIGHT: 174 LBS | TEMPERATURE: 97.6 F

## 2018-06-13 DIAGNOSIS — I10 BENIGN ESSENTIAL HYPERTENSION: ICD-10-CM

## 2018-06-13 DIAGNOSIS — I16.1 HYPERTENSIVE EMERGENCY, NO CHF: Primary | ICD-10-CM

## 2018-06-13 LAB
ALBUMIN SERPL-MCNC: 3.8 G/DL (ref 3.4–5)
ALBUMIN UR-MCNC: NEGATIVE MG/DL
ALP SERPL-CCNC: 72 U/L (ref 40–150)
ALT SERPL W P-5'-P-CCNC: 78 U/L (ref 0–50)
ANION GAP SERPL CALCULATED.3IONS-SCNC: 11 MMOL/L (ref 3–14)
APPEARANCE UR: CLEAR
AST SERPL W P-5'-P-CCNC: 85 U/L (ref 0–45)
BACTERIA #/AREA URNS HPF: ABNORMAL /HPF
BASOPHILS # BLD AUTO: 0 10E9/L (ref 0–0.2)
BASOPHILS NFR BLD AUTO: 0.6 %
BILIRUB SERPL-MCNC: 0.4 MG/DL (ref 0.2–1.3)
BILIRUB UR QL STRIP: NEGATIVE
BUN SERPL-MCNC: 15 MG/DL (ref 7–30)
CALCIUM SERPL-MCNC: 8.9 MG/DL (ref 8.5–10.1)
CHLORIDE SERPL-SCNC: 107 MMOL/L (ref 94–109)
CO2 SERPL-SCNC: 20 MMOL/L (ref 20–32)
COLOR UR AUTO: ABNORMAL
CREAT SERPL-MCNC: 0.64 MG/DL (ref 0.52–1.04)
CRP SERPL-MCNC: 7.5 MG/L (ref 0–8)
DIFFERENTIAL METHOD BLD: NORMAL
EOSINOPHIL # BLD AUTO: 0 10E9/L (ref 0–0.7)
EOSINOPHIL NFR BLD AUTO: 0.8 %
ERYTHROCYTE [DISTWIDTH] IN BLOOD BY AUTOMATED COUNT: 13.3 % (ref 10–15)
GFR SERPL CREATININE-BSD FRML MDRD: 90 ML/MIN/1.7M2
GLUCOSE SERPL-MCNC: 254 MG/DL (ref 70–99)
GLUCOSE UR STRIP-MCNC: NEGATIVE MG/DL
HCT VFR BLD AUTO: 43.2 % (ref 35–47)
HGB BLD-MCNC: 15.5 G/DL (ref 11.7–15.7)
HGB UR QL STRIP: NEGATIVE
IMM GRANULOCYTES # BLD: 0 10E9/L (ref 0–0.4)
IMM GRANULOCYTES NFR BLD: 0.4 %
KETONES UR STRIP-MCNC: NEGATIVE MG/DL
LEUKOCYTE ESTERASE UR QL STRIP: ABNORMAL
LYMPHOCYTES # BLD AUTO: 1.4 10E9/L (ref 0.8–5.3)
LYMPHOCYTES NFR BLD AUTO: 28.7 %
MCH RBC QN AUTO: 31.2 PG (ref 26.5–33)
MCHC RBC AUTO-ENTMCNC: 35.9 G/DL (ref 31.5–36.5)
MCV RBC AUTO: 87 FL (ref 78–100)
MONOCYTES # BLD AUTO: 0.3 10E9/L (ref 0–1.3)
MONOCYTES NFR BLD AUTO: 5.9 %
MUCOUS THREADS #/AREA URNS LPF: PRESENT /LPF
NEUTROPHILS # BLD AUTO: 3.1 10E9/L (ref 1.6–8.3)
NEUTROPHILS NFR BLD AUTO: 63.6 %
NITRATE UR QL: NEGATIVE
NRBC # BLD AUTO: 0 10*3/UL
NRBC BLD AUTO-RTO: 0 /100
NT-PROBNP SERPL-MCNC: 71 PG/ML (ref 0–1800)
PH UR STRIP: 5 PH (ref 4.7–8)
PLATELET # BLD AUTO: 175 10E9/L (ref 150–450)
POTASSIUM SERPL-SCNC: 4.2 MMOL/L (ref 3.4–5.3)
PROT SERPL-MCNC: 7.9 G/DL (ref 6.8–8.8)
RBC # BLD AUTO: 4.97 10E12/L (ref 3.8–5.2)
RBC #/AREA URNS AUTO: <1 /HPF (ref 0–2)
SODIUM SERPL-SCNC: 138 MMOL/L (ref 133–144)
SOURCE: ABNORMAL
SP GR UR STRIP: 1.01 (ref 1–1.03)
SQUAMOUS #/AREA URNS AUTO: 2 /HPF (ref 0–1)
TRANS CELLS #/AREA URNS HPF: 1 /HPF (ref 0–1)
TROPONIN I SERPL-MCNC: <0.015 UG/L (ref 0–0.04)
UROBILINOGEN UR STRIP-MCNC: NORMAL MG/DL (ref 0–2)
WBC # BLD AUTO: 4.9 10E9/L (ref 4–11)
WBC #/AREA URNS AUTO: 4 /HPF (ref 0–5)

## 2018-06-13 PROCEDURE — G0463 HOSPITAL OUTPT CLINIC VISIT: HCPCS

## 2018-06-13 PROCEDURE — 81001 URINALYSIS AUTO W/SCOPE: CPT | Performed by: FAMILY MEDICINE

## 2018-06-13 PROCEDURE — 85025 COMPLETE CBC W/AUTO DIFF WBC: CPT | Performed by: FAMILY MEDICINE

## 2018-06-13 PROCEDURE — 99284 EMERGENCY DEPT VISIT MOD MDM: CPT | Mod: 27

## 2018-06-13 PROCEDURE — 99284 EMERGENCY DEPT VISIT MOD MDM: CPT | Performed by: FAMILY MEDICINE

## 2018-06-13 PROCEDURE — 99213 OFFICE O/P EST LOW 20 MIN: CPT | Performed by: FAMILY MEDICINE

## 2018-06-13 PROCEDURE — 86140 C-REACTIVE PROTEIN: CPT | Performed by: FAMILY MEDICINE

## 2018-06-13 PROCEDURE — 93010 ELECTROCARDIOGRAM REPORT: CPT | Performed by: INTERNAL MEDICINE

## 2018-06-13 PROCEDURE — 36415 COLL VENOUS BLD VENIPUNCTURE: CPT | Performed by: FAMILY MEDICINE

## 2018-06-13 PROCEDURE — 83880 ASSAY OF NATRIURETIC PEPTIDE: CPT | Mod: GZ | Performed by: FAMILY MEDICINE

## 2018-06-13 PROCEDURE — 80053 COMPREHEN METABOLIC PANEL: CPT | Performed by: FAMILY MEDICINE

## 2018-06-13 PROCEDURE — 87086 URINE CULTURE/COLONY COUNT: CPT | Performed by: FAMILY MEDICINE

## 2018-06-13 PROCEDURE — 84484 ASSAY OF TROPONIN QUANT: CPT | Performed by: FAMILY MEDICINE

## 2018-06-13 PROCEDURE — 93005 ELECTROCARDIOGRAM TRACING: CPT

## 2018-06-13 RX ORDER — ALBUTEROL SULFATE 90 UG/1
AEROSOL, METERED RESPIRATORY (INHALATION)
Refills: 0 | COMMUNITY
Start: 2018-04-09 | End: 2018-06-13

## 2018-06-13 RX ORDER — TOBRAMYCIN 3 MG/ML
SOLUTION/ DROPS OPHTHALMIC
Refills: 1 | COMMUNITY
Start: 2018-05-22 | End: 2018-08-13

## 2018-06-13 ASSESSMENT — ENCOUNTER SYMPTOMS
ACTIVITY CHANGE: 1
NAUSEA: 0
MUSCULOSKELETAL NEGATIVE: 1
PALPITATIONS: 0
WEAKNESS: 0
CONSTIPATION: 0
DIARRHEA: 0
DYSURIA: 0
FATIGUE: 0
SHORTNESS OF BREATH: 0
DIZZINESS: 0
FEVER: 0
ABDOMINAL PAIN: 0
NERVOUS/ANXIOUS: 1
VOMITING: 0
DIAPHORESIS: 0

## 2018-06-13 ASSESSMENT — ANXIETY QUESTIONNAIRES
7. FEELING AFRAID AS IF SOMETHING AWFUL MIGHT HAPPEN: NOT AT ALL
3. WORRYING TOO MUCH ABOUT DIFFERENT THINGS: NOT AT ALL
5. BEING SO RESTLESS THAT IT IS HARD TO SIT STILL: NOT AT ALL
2. NOT BEING ABLE TO STOP OR CONTROL WORRYING: NOT AT ALL
GAD7 TOTAL SCORE: 0
6. BECOMING EASILY ANNOYED OR IRRITABLE: NOT AT ALL
4. TROUBLE RELAXING: NOT AT ALL
1. FEELING NERVOUS, ANXIOUS, OR ON EDGE: NOT AT ALL

## 2018-06-13 ASSESSMENT — PAIN SCALES - GENERAL: PAINLEVEL: NO PAIN (0)

## 2018-06-13 NOTE — DISCHARGE INSTRUCTIONS
Taking Your Blood Pressure  Blood pressure is the force of blood against the artery wall as it moves from the heart through the blood vessels. You can take your own blood pressure reading using a digital monitor. Take your readings the same each time, using the same arm. Take readings as often as your healthcare provider instructs.  About blood pressure monitors  Blood pressure monitors are designed for certain ages and cases. You can find monitors for older adults, for pregnant women, and for children. Make sure the one you choose is the right one for your age and situation.  The American Heart Association recommends an automatic cuff monitor that fits on your upper arm (bicep). The cuff should fit your arm size. A cuff that s too large or too small will not give an accurate reading. Measure around your upper arm to find your size.  Monitors that attach to your finger or wrist are not as accurate as monitors for your upper arm.  Ask your healthcare provider for help in choosing a monitor. Bring your monitor to your next provider visit if you need help in using it the correct way.  The steps below are general instructions for using an automatic digital monitor.  Step 1. Relax      Take your blood pressure at the same time every day, such as in the morning or evening, or at the time your healthcare provider recommends.    Wait at least a half-hour after smoking, eating, or exercising. Don't drink coffee, tea, soda, or other caffeinated beverages before checking your blood pressure.    Sit comfortably at a table with both feet on the floor. Do not cross your legs or feet. Place the monitor near you.    Rest for a few minutes before you begin.  Step 2. Wrap the cuff      Place your arm on the table, palm up. Your arm should be at the level of your heart. Wrap the cuff around your upper arm, just above your elbow. It s best done on bare skin, not over clothing. Most cuffs will indicate where the brachial artery (the  blood vessel in the middle of the arm at the inner side of the elbow) should line up with the cuff. Look in your monitor's instruction booklet for an illustration. You can also bring your cuff to your healthcare provider and have them show you how to correctly place the cuff.  Step 3. Inflate the cuff      Push the button that starts the pump.    The cuff will tighten, then loosen.    The numbers will change. When they stop changing, your blood pressure reading will appear.    Take 2 or 3 readings one minute apart.  Step 4. Write down the results of each reading      Write down your blood pressure numbers for each reading. Note the date and time. Keep your results in one place, such as a notebook. Even if your monitor has a built-in memory, keep a hard copy of the readings.    Remove the cuff from your arm. Turn off the machine.    Bring your blood pressure records with your healthcare providers at each visit.    If you start a new blood pressure medicine, note the day you started the new medicine. Also note the day if you change the dose of your medicine. This information goes on your blood pressure recording sheet. This will help your healthcare provider monitor how well the medicine changes are working.    Ask your healthcare provider what numbers should prompt you to call him or her. Also ask what numbers should prompt you to get help right away.  Date Last Reviewed: 11/1/2016 2000-2017 [employer]. [address] [place] [address], [place] [address]. All rights reserved. This information is not intended as a substitute for professional medical care. Always follow your healthcare professional's instructions.

## 2018-06-13 NOTE — ED NOTES
"In attempts to clarify pts Diovan med dose, called [facility] to ensure that had proper dose and frequency, they informed writer that they had a increase in Diovan dose from 80 to 160mg BID, on 5/17/18 and pt picked up her prescription. Pharmacy to ask writer to clarify date on bottles, writer did note bottles were from 12/17 and 5/13/18, which stated to take daily. Educated pt that she was to be taking Diovan 160 mg twice a day, \"she did take 2 tabs this am.\" Provider made aware.   "

## 2018-06-13 NOTE — PROGRESS NOTES
MARIANNA  None  BARRIER:  None identified  GOAL:  To assist her with resources    6/13/2018  Reason for Referral: Assessments  PCP: Jennie Rubio  Cognitive Behavioral Status: she is behaviorally intact and cognitively intact    Affect and Mood Status: she is appropriate and engaging    Support System: brother and sister     Current Living Situation:    Home Setting: three level home where she has laundry  In her basement.  Railings to the basement Her shower/bath is on the main floor.  She has an upper level as well.     Living Situation:lives alone   Function Status/Assistive Device: Independent     Employment/Financial/Insurance Concerns: is retired       Patient's insurance coverage: medicare and [employer]    [place] Status: no  Previous Services:none  Fall Risk assessment in home: has only fallen outside in the community    Receptive for brochure to take home:     Smoke alarm/Carbon monoxide detectors in home: yes batteries checked    Fire extinguisher: no    Assessment: This is a 78 year old woman who presented with high blood pressure.  This is she believes could be caused by the interaction she had with a gentleman at the mall who inquired about her number. She does live alone. She is able to drive and gets all of her needs met.      Plan: home

## 2018-06-13 NOTE — ED AVS SNAPSHOT
[facility]    [address] [address]    Phone:  [phone]                                       Bel Reardon   MRN: [id number]    Department:  [facility]   Date of Visit:  6/13/2018           After Visit Summary Signature Page     I have received my discharge instructions, and my questions have been answered. I have discussed any challenges I see with this plan with the nurse or doctor.    ..........................................................................................................................................  Patient/Patient Representative Signature      ..........................................................................................................................................  Patient Representative Print Name and Relationship to Patient    ..................................................               ................................................  Date                                            Time    ..........................................................................................................................................  Reviewed by Signature/Title    ...................................................              ..............................................  Date                                                            Time

## 2018-06-13 NOTE — MR AVS SNAPSHOT
"              After Visit Summary   6/13/2018    Bel Reardon    MRN: [id number]           Patient Information     Date Of Birth          1940        Visit Information        Provider Department      6/13/2018 8:30 AM Jennie Rubio MD [facility]        Today's Diagnoses     Hypertensive emergency, no CHF    -  1       Follow-ups after your visit        Your next 10 appointments already scheduled     Nov 12, 2018 10:30 AM CST   (Arrive by 10:15 AM)   Return Visit with Kristal Pagan RD   [facility] ([facility] )    [address] [place] [id number] [phone]              Who to contact     If you have questions or need follow up information about today's clinic visit or your schedule please contact [facility] directly at [phone].  Normal or non-critical lab and imaging results will be communicated to you by MyChart, letter or phone within 4 business days after the clinic has received the results. If you do not hear from us within 7 days, please contact the clinic through MyChart or phone. If you have a critical or abnormal lab result, we will notify you by phone as soon as possible.  Submit refill requests through Dob[facility] or call your pharmacy and they will forward the refill request to us. Please allow 3 business days for your refill to be completed.          Additional Information About Your Visit        MyChart Information     D[facility]let lets you send messages to your doctor, view your test results, renew your prescriptions, schedule appointments and more. To sign up, go to [email or web address] . Click on \"Log in\" on the left side of the screen, which will take you to the Welcome page. Then click on \"Sign up Now\" on the right side of the page.     You will be asked to enter the access code listed below, as well as some personal information. Please follow the directions to create your username and password.     Your access code is: " [email or web address]  Expires: 2018  1:03 PM     Your access code will  in 90 days. If you need help or a new code, please call your [facility] clinic or [phone].        Care EveryWhere ID     This is your Care EveryWhere ID. This could be used by other organizations to access your [facility] medical records  [id number]        Your Vitals Were     Pulse Temperature Pulse Oximetry BMI (Body Mass Index)          62 97.6  F (36.4  C) (Tympanic) 96% 33.98 kg/m2         Blood Pressure from Last 3 Encounters:   18 (!) 220/106   18 (!) 234/115   18 127/66    Weight from Last 3 Encounters:   18 174 lb (78.9 kg)   18 174 lb (78.9 kg)   18 172 lb 12.8 oz (78.4 kg)              Today, you had the following     No orders found for display       Primary Care Provider Office Phone # Fax #    Jennie Rubio [phone]       [facility] [place] [address] [facility] [address] [place] [address]        Equal Access to Services     [facility] : Hadii aad ku hadasho Socelsoali, waaxda luqadaha, qaybta kaalmada adeegyada, gemini betancourt . So [facility] [phone].    ATENCIÓN: Si habla español, tiene a guaman disposición servicios gratuitos de asistencia lingüística. [facility] [phone].    We comply with applicable federal civil rights laws and [place] laws. We do not discriminate on the basis of race, color, national origin, age, disability, sex, sexual orientation, or gender identity.            Thank you!     Thank you for choosing [facility] [place]  for your care. Our goal is always to provide you with excellent care. Hearing back from our patients is one way we can continue to improve our services. Please take a few minutes to complete the written survey that you may receive in the mail after your visit with us. Thank you!             Your Updated Medication List - Protect others around you: Learn how to safely use, store and throw away your medicines  at www.disposemymeds.org.          This list is accurate as of 6/13/18  9:13 AM.  Always use your most recent med list.                   Brand Name Dispense Instructions for use Diagnosis    allopurinol 100 MG tablet    ZYLOPRIM    135 tablet    TAKE ONE & ONE-HALF TABLETS BY MOUTH DAILY    Idiopathic gout, unspecified chronicity, unspecified site       blood glucose monitoring lancets     3 each    Use to test blood sugar 1 times daily or as directed.    Type 2 diabetes mellitus without complication, without long-term current use of insulin (H)       blood glucose monitoring meter device kit     1 kit    1 kit by In Vitro route daily Use to test blood sugar 1 times daily or as directed.    Type 2 diabetes mellitus without complication, without long-term current use of insulin (H)       Fish Oil 1000 MG Cpdr      Take 1 capsule by mouth daily        ILEVRO 0.3 % ophthalmic suspension   Generic drug:  nepafenac      PLACE 1 DROP IN RIGHT EYE DAILY 3 DAYS BEFORE SURGERY THEN DAILY FOR 6 WEEKS AFTER SURGERY        levothyroxine 25 MCG tablet    SYNTHROID/LEVOTHROID    90 tablet    TAKE 1 TABLET BY MOUTH EVERY DAY    Other specified hypothyroidism       metFORMIN 500 MG tablet    GLUCOPHAGE    60 tablet    TAKE 1 TABLET BY MOUTH WITH BREAKFAST AND 1 TABLET WITH SUPPER    Type 2 diabetes mellitus without complication, without long-term current use of insulin (H)       ONETOUCH ULTRA test strip   Generic drug:  blood glucose monitoring     100 each    TEST BLOOD SURGARS ONCE A DAY    Type 2 diabetes mellitus without complication (H)       tobramycin 0.3 % ophthalmic solution    TOBREX     INSTILL 1 DROP IN RIGHT EYE FOUR TIMES DAILY        valsartan 160 MG tablet    DIOVAN    60 tablet    Take 1 tablet (160 mg) by mouth 2 times daily    Benign essential HTN

## 2018-06-13 NOTE — ED TRIAGE NOTES
Went to the clinic for a f/u appt this am and was found to have elevated BP. Denies h/a. States getting her BP checked gives her panic attacks. Pt anxious.

## 2018-06-13 NOTE — NURSING NOTE
Chief Complaint   Patient presents with     Diabetes       Initial BP (!) 220/106  Pulse 62  Temp 97.6  F (36.4  C) (Tympanic)  Wt 174 lb (78.9 kg)  SpO2 96%  BMI 33.98 kg/m2 Estimated body mass index is 33.98 kg/(m^2) as calculated from the following:    Height as of 5/22/18: 5' (1.524 m).    Weight as of this encounter: 174 lb (78.9 kg).  Medication Reconciliation: complete    Margarette Zayas MA
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
## 2018-06-13 NOTE — PROGRESS NOTES
SUBJECTIVE:                                                    Bel Reardon is a 78 year old female who presents to clinic today for the following health issues:      Diabetes Follow-up    Patient is checking blood sugars: once daily.  Results are as follows:         am -140-169    Diabetic concerns: None     Symptoms of hypoglycemia (low blood sugar): none     Paresthesias (numbness or burning in feet) or sores: No     Date of last diabetic eye exam: 3-30-18 but has since had cataract surgery.    Hyperlipidemia Follow-Up      Rate your low fat/cholesterol diet?: good    Taking statin?  No    Other lipid medications/supplements?:  Fish oil/Omega 3, dose 1000 mg without side effects    Hypertension Follow-up      Outpatient blood pressures are not being checked.    Low Salt Diet: 1 gram sodium    BP Readings from Last 2 Encounters:   05/22/18 127/66   05/10/18 186/82     Hemoglobin A1C (%)   Date Value   05/10/2018 7.4 (H)   02/15/2018 6.8 (H)     LDL Cholesterol Calculated (mg/dL)   Date Value   11/01/2017 131 (H)   03/20/2017 161 (H)       Amount of exercise or physical activity: 4-5 days/week for an average of 15-30 minutes    Problems taking medications regularly: No    Medication side effects: none    Diet: diabetic and started weight watchers yesterday.        Problem list and histories reviewed & adjusted, as indicated.  Additional history: as documented    Patient Active Problem List   Diagnosis     Advance care planning     Mixed hyperlipidemia     Postmenopausal bleeding     Type 2 diabetes mellitus without complication, without long-term current use of insulin (H)     Benign essential hypertension     Chronic right-sided low back pain with right-sided sciatica     Combined forms of age-related cataract     Hives     Hypothyroidism, unspecified type     Past Surgical History:   Procedure Laterality Date     COLONOSCOPY N/A 8/21/2015    no further scopes needed.  Surgeon: Yohan Licona, DO;   [facility]: [facility]     DILATION AND CURETTAGE, OPERATIVE HYSTEROSCOPY, COMBINED N/A 1/13/2016    Procedure: COMBINED DILATION AND CURETTAGE, OPERATIVE HYSTEROSCOPY;  Surgeon: Seven Kern MD;  Location: [facility]     left shoulder  1990    nerve damage  s/p trauma; from repetitious work     lumpectomy left breast  1990    benign -- fibroadenoma-left; Facility; [facility]     PHACOEMULSIFICATION WITH STANDARD INTRAOCULAR LENS IMPLANT Left 11/11/2014    Procedure: PHACOEMULSIFICATION WITH STANDARD INTRAOCULAR LENS IMPLANT;  Surgeon: Bobby Arambula MD;  Location: [facility]     PHACOEMULSIFICATION WITH STANDARD INTRAOCULAR LENS IMPLANT Right 5/22/2018    Procedure: PHACOEMULSIFICATION WITH STANDARD INTRAOCULAR LENS IMPLANT;  PHACOEMULSIFICATION CATARACT EXTRACTION POSTERIOR CHAMBER LENS RIGHT;  Surgeon: Bert Haddad MD;  Location: [facility]       Social History   Substance Use Topics     Smoking status: Former Smoker     Years: 14.00     Types: Cigarettes     Smokeless tobacco: Never Used      Comment: 20.00 per day; quit 1991     Alcohol use 0.0 oz/week     0 Standard drinks or equivalent per week      Comment: 1 beer weekly     Family History   Problem Relation Age of Onset     C.A.D. Father      Hypertension Father      Other - See Comments Father 78     hyperlipidemia/MI; cause of death     C.A.D. Mother      s/p AMI, pacemaker     DIABETES Mother 95     Hypertension Mother      Other - See Comments Mother      hyperlipidemia/hypothyroid     CANCER Brother 72     lip; cause of death     CANCER Sister 65     lung     Coronary Artery Disease Brother      gallbladder     Other - See Comments Brother 50     AMI x2     Other - See Comments Sister      hyperlipidemia     Hypertension Sister      Other - See Comments Maternal Grandmother      old age     Unknown/Adopted Maternal Grandfather      Other - See Comments Paternal Grandmother 75     old age     Unknown/Adopted Paternal Grandfather          Current Outpatient Prescriptions    Medication Sig Dispense Refill     allopurinol (ZYLOPRIM) 100 MG tablet TAKE ONE & ONE-HALF TABLETS BY MOUTH DAILY 135 tablet 1     blood glucose monitoring (ONE TOUCH DELICA) lancets Use to test blood sugar 1 times daily or as directed. 3 each 11     blood glucose monitoring (ONE TOUCH ULTRA MINI) meter device kit 1 kit by In Vitro route daily Use to test blood sugar 1 times daily or as directed. 1 kit 0     hydrocortisone 2.5 % cream Apply topically 2 times daily 30 g 0     ILEVRO 0.3 % ophthalmic suspension PLACE 1 DROP IN RIGHT EYE DAILY 3 DAYS BEFORE SURGERY THEN DAILY FOR 6 WEEKS AFTER SURGERY  0     levothyroxine (SYNTHROID/LEVOTHROID) 25 MCG tablet TAKE 1 TABLET BY MOUTH EVERY DAY 90 tablet 3     metFORMIN (GLUCOPHAGE) 500 MG tablet TAKE 1 TABLET BY MOUTH WITH BREAKFAST AND 1 TABLET WITH SUPPER 60 tablet 2     Omega-3 Fatty Acids (FISH OIL) 1000 MG CPDR Take 1 capsule by mouth daily       ONE TOUCH ULTRA test strip TEST BLOOD SURGARS ONCE A  each 3     tobramycin (TOBREX) 0.3 % ophthalmic solution INSTILL 1 DROP IN RIGHT EYE FOUR TIMES DAILY  1     valsartan (DIOVAN) 160 MG tablet Take 1 tablet (160 mg) by mouth 2 times daily 60 tablet 2     fenofibrate 145 MG tablet Take 1 tablet (145 mg) by mouth daily (Patient not taking: Reported on 4/27/2018) 90 tablet 0     Allergies   Allergen Reactions     Aspirin      Tightness in chest       Erythromycin Base [Kdc:Yellow Dye+Erythromycin+Brilliant Blue Fcf]      Ezetimibe Other (See Comments)     Aches; Zetia       Fenofibrate Micronized [Fenofibrate]      Aches; Tricor     Levofloxacin      Flu-like symptoms.     Lisinopril Other (See Comments)     headache     No Clinical Screening - See Comments      Fenofibrate nanocrystallized; Aches- Tricor     Pravastatin Sodium Other (See Comments)     Myalgia; Pravachol     Dexamethasone Rash     Maxidex     Dexamethasone Sod Phosphate [Dexamethasone Sodium Phosphate] Rash     Maxidex     Niacin Rash      Rosuvastatin Calcium Rash     crestor       Labs reviewed in EPIC    ROS:  Constitutional, HEENT, cardiovascular, pulmonary, gi and gu systems are negative, except as otherwise noted.    OBJECTIVE:                                                    BP (!) 220/106  Pulse 62  Temp 97.6  F (36.4  C) (Tympanic)  Wt 174 lb (78.9 kg)  SpO2 96%  BMI 33.98 kg/m2  Body mass index is 33.98 kg/(m^2).  GENERAL APPEARANCE: healthy, alert and mild distress  PSYCH: mentation appears normal, affect normal/bright and anxious       ASSESSMENT/PLAN:                                                    1. Hypertensive emergency, no CHF  Patient seems at her baseline as far as anxiety but also slightly confused, bp meds were changed at her last visit to valsartan 160 BID and she reports the pharmacy gave her 80 mg BID, but both bottles she carries today are from December and march and still have at least a weeks worth of meds in it.  Will transfer to ER, Dr Alisa garza.  Nurse transported via wheelchair.  Make f/u appt after ER    Patient was agreeable to this plan and had no further questions.  See Patient Instructions    Jennie Rbuio MD  [facility]

## 2018-06-13 NOTE — ED AVS SNAPSHOT
[facility]    [address] [place] [address]    [place] [address]    Phone:  [phone]                                       Bel Reardon   MRN: [id number]    Department:  [facility]   Date of Visit:  6/13/2018           Patient Information     Date Of Birth          1940        Your diagnoses for this visit were:     Benign essential hypertension        You were seen by Madina Davies MD.      Follow-up Information     Follow up with Jennie Rubio MD.    Specialty:  Family Practice    Why:  Follow up ED visit    Contact information:    [facility]  [address]  [place] [address]  [phone]          Discharge Instructions         Taking Your Blood Pressure  Blood pressure is the force of blood against the artery wall as it moves from the heart through the blood vessels. You can take your own blood pressure reading using a digital monitor. Take your readings the same each time, using the same arm. Take readings as often as your healthcare provider instructs.  About blood pressure monitors  Blood pressure monitors are designed for certain ages and cases. You can find monitors for older adults, for pregnant women, and for children. Make sure the one you choose is the right one for your age and situation.  The American Heart Association recommends an automatic cuff monitor that fits on your upper arm (bicep). The cuff should fit your arm size. A cuff that s too large or too small will not give an accurate reading. Measure around your upper arm to find your size.  Monitors that attach to your finger or wrist are not as accurate as monitors for your upper arm.  Ask your healthcare provider for help in choosing a monitor. Bring your monitor to your next provider visit if you need help in using it the correct way.  The steps below are general instructions for using an automatic digital monitor.  Step 1. Relax      Take your blood pressure at the same time  every day, such as in the morning or evening, or at the time your healthcare provider recommends.    Wait at least a half-hour after smoking, eating, or exercising. Don't drink coffee, tea, soda, or other caffeinated beverages before checking your blood pressure.    Sit comfortably at a table with both feet on the floor. Do not cross your legs or feet. Place the monitor near you.    Rest for a few minutes before you begin.  Step 2. Wrap the cuff      Place your arm on the table, palm up. Your arm should be at the level of your heart. Wrap the cuff around your upper arm, just above your elbow. It s best done on bare skin, not over clothing. Most cuffs will indicate where the brachial artery (the blood vessel in the middle of the arm at the inner side of the elbow) should line up with the cuff. Look in your monitor's instruction booklet for an illustration. You can also bring your cuff to your healthcare provider and have them show you how to correctly place the cuff.  Step 3. Inflate the cuff      Push the button that starts the pump.    The cuff will tighten, then loosen.    The numbers will change. When they stop changing, your blood pressure reading will appear.    Take 2 or 3 readings one minute apart.  Step 4. Write down the results of each reading      Write down your blood pressure numbers for each reading. Note the date and time. Keep your results in one place, such as a notebook. Even if your monitor has a built-in memory, keep a hard copy of the readings.    Remove the cuff from your arm. Turn off the machine.    Bring your blood pressure records with your healthcare providers at each visit.    If you start a new blood pressure medicine, note the day you started the new medicine. Also note the day if you change the dose of your medicine. This information goes on your blood pressure recording sheet. This will help your healthcare provider monitor how well the medicine changes are working.    Ask your  healthcare provider what numbers should prompt you to call him or her. Also ask what numbers should prompt you to get help right away.  Date Last Reviewed: 11/1/2016 2000-2017 [employer] [address] [place] [address], [place] [address]. All rights reserved. This information is not intended as a substitute for professional medical care. Always follow your healthcare professional's instructions.          Your next 10 appointments already scheduled     Jun 18, 2018  2:00 PM CDT   (Arrive by 1:45 PM)   SHORT with Jennie Rubio MD   [facility] ([facility] )    [address] [place] [address]   [phone]            Nov 12, 2018 10:30 AM CST   (Arrive by 10:15 AM)   Return Visit with Kristal Pagan RD   [facility] ([facility] )    [address] [place] [id number] [phone]                 Review of your medicines      Our records show that you are taking the medicines listed below. If these are incorrect, please call your family doctor or clinic.        Dose / Directions Last dose taken    allopurinol 100 MG tablet   Commonly known as:  ZYLOPRIM   Quantity:  135 tablet        TAKE ONE & ONE-HALF TABLETS BY MOUTH DAILY   Refills:  1        blood glucose monitoring lancets   Quantity:  3 each        Use to test blood sugar 1 times daily or as directed.   Refills:  11        blood glucose monitoring meter device kit   Dose:  1 kit   Quantity:  1 kit        1 kit by In Vitro route daily Use to test blood sugar 1 times daily or as directed.   Refills:  0        Fish Oil 1000 MG Cpdr   Dose:  1 capsule        Take 1 capsule by mouth daily   Refills:  0        ILEVRO 0.3 % ophthalmic suspension   Generic drug:  nepafenac        PLACE 1 DROP IN RIGHT EYE DAILY 3 DAYS BEFORE SURGERY THEN DAILY FOR 6 WEEKS AFTER SURGERY   Refills:  0        levothyroxine 25 MCG tablet   Commonly known as:  SYNTHROID/LEVOTHROID   Quantity:  90 tablet      "   TAKE 1 TABLET BY MOUTH EVERY DAY   Refills:  3        metFORMIN 500 MG tablet   Commonly known as:  GLUCOPHAGE   Quantity:  60 tablet        TAKE 1 TABLET BY MOUTH WITH BREAKFAST AND 1 TABLET WITH SUPPER   Refills:  2        ONETOUCH ULTRA test strip   Quantity:  100 each   Generic drug:  blood glucose monitoring        TEST BLOOD SURGARS ONCE A DAY   Refills:  3        tobramycin 0.3 % ophthalmic solution   Commonly known as:  TOBREX        INSTILL 1 DROP IN RIGHT EYE FOUR TIMES DAILY   Refills:  1        valsartan 160 MG tablet   Commonly known as:  DIOVAN   Dose:  160 mg   Quantity:  60 tablet        Take 1 tablet (160 mg) by mouth 2 times daily   Refills:  2                Procedures and tests performed during your visit     CBC with platelets differential    CRP inflammation    Comprehensive metabolic panel    EKG 12-lead, tracing only    Nt probnp inpatient (BNP)    Peripheral IV catheter    Troponin I    UA reflex to Microscopic and Culture    Urine Culture Aerobic Bacterial      Orders Needing Specimen Collection     None      Pending Results     Date and Time Order Name Status Description    6/13/2018 0955 Urine Culture Aerobic Bacterial In process             Pending Culture Results     Date and Time Order Name Status Description    6/13/2018 0955 Urine Culture Aerobic Bacterial In process             Thank you for choosing [facility]       Thank you for choosing [facility] for your care. Our goal is always to provide you with excellent care. Hearing back from our patients is one way we can continue to improve our services. Please take a few minutes to complete the written survey that you may receive in the mail after you visit with us. Thank you!        [email or web address] Information     [employer] lets you send messages to your doctor, view your test results, renew your prescriptions, schedule appointments and more. To sign up, go to [email or web address] . Click on \"Log in\" on the left side of the screen, which " "will take you to the Welcome page. Then click on \"Sign up Now\" on the right side of the page.     You will be asked to enter the access code listed below, as well as some personal information. Please follow the directions to create your username and password.     Your access code is: [email or web address]  Expires: 2018  1:03 PM     Your access code will  in 90 days. If you need help or a new code, please call your [facility] clinic or [phone].        Care EveryWhere ID     This is your Care EveryWhere ID. This could be used by other organizations to access your [facility] medical records  [id number]        Equal Access to Services     SANDOR ESTRELLA : [place], aye blum, stephane umaña, gemini leyva. So [facility] [phone].    ATENCIÓN: Si habla español, tiene a guaman disposición servicios gratuitos de asistencia lingüística. Llame al [phone].    We comply with applicable federal civil rights laws and [place] laws. We do not discriminate on the basis of race, color, national origin, age, disability, sex, sexual orientation, or gender identity.            After Visit Summary       This is your record. Keep this with you and show to your community pharmacist(s) and doctor(s) at your next visit.                  "

## 2018-06-14 LAB
BACTERIA SPEC CULT: NORMAL
SPECIMEN SOURCE: NORMAL

## 2018-06-14 ASSESSMENT — PATIENT HEALTH QUESTIONNAIRE - PHQ9: SUM OF ALL RESPONSES TO PHQ QUESTIONS 1-9: 0

## 2018-06-14 ASSESSMENT — ANXIETY QUESTIONNAIRES: GAD7 TOTAL SCORE: 0

## 2018-06-18 ENCOUNTER — OFFICE VISIT (OUTPATIENT)
Dept: FAMILY MEDICINE | Facility: OTHER | Age: 78
End: 2018-06-18
Attending: FAMILY MEDICINE
Payer: COMMERCIAL

## 2018-06-18 VITALS
OXYGEN SATURATION: 95 % | BODY MASS INDEX: 32.16 KG/M2 | HEART RATE: 86 BPM | SYSTOLIC BLOOD PRESSURE: 204 MMHG | DIASTOLIC BLOOD PRESSURE: 94 MMHG | WEIGHT: 173 LBS | TEMPERATURE: 98.2 F

## 2018-06-18 DIAGNOSIS — E78.2 MIXED HYPERLIPIDEMIA: ICD-10-CM

## 2018-06-18 DIAGNOSIS — I10 BENIGN ESSENTIAL HYPERTENSION: ICD-10-CM

## 2018-06-18 DIAGNOSIS — F41.1 GAD (GENERALIZED ANXIETY DISORDER): Primary | ICD-10-CM

## 2018-06-18 PROCEDURE — G0463 HOSPITAL OUTPT CLINIC VISIT: HCPCS

## 2018-06-18 PROCEDURE — 99214 OFFICE O/P EST MOD 30 MIN: CPT | Performed by: FAMILY MEDICINE

## 2018-06-18 RX ORDER — ATORVASTATIN CALCIUM 10 MG/1
10 TABLET, FILM COATED ORAL DAILY
Qty: 30 TABLET | Refills: 1 | Status: SHIPPED | OUTPATIENT
Start: 2018-06-18 | End: 2018-08-01

## 2018-06-18 RX ORDER — HYDROXYZINE PAMOATE 25 MG/1
25 CAPSULE ORAL 3 TIMES DAILY PRN
Qty: 30 CAPSULE | Refills: 1 | Status: SHIPPED | OUTPATIENT
Start: 2018-06-18 | End: 2019-03-28

## 2018-06-18 ASSESSMENT — ANXIETY QUESTIONNAIRES
6. BECOMING EASILY ANNOYED OR IRRITABLE: MORE THAN HALF THE DAYS
7. FEELING AFRAID AS IF SOMETHING AWFUL MIGHT HAPPEN: SEVERAL DAYS
1. FEELING NERVOUS, ANXIOUS, OR ON EDGE: MORE THAN HALF THE DAYS
IF YOU CHECKED OFF ANY PROBLEMS ON THIS QUESTIONNAIRE, HOW DIFFICULT HAVE THESE PROBLEMS MADE IT FOR YOU TO DO YOUR WORK, TAKE CARE OF THINGS AT HOME, OR GET ALONG WITH OTHER PEOPLE: SOMEWHAT DIFFICULT
5. BEING SO RESTLESS THAT IT IS HARD TO SIT STILL: SEVERAL DAYS
2. NOT BEING ABLE TO STOP OR CONTROL WORRYING: MORE THAN HALF THE DAYS
3. WORRYING TOO MUCH ABOUT DIFFERENT THINGS: MORE THAN HALF THE DAYS
4. TROUBLE RELAXING: MORE THAN HALF THE DAYS
GAD7 TOTAL SCORE: 12

## 2018-06-18 ASSESSMENT — PAIN SCALES - GENERAL: PAINLEVEL: NO PAIN (0)

## 2018-06-18 NOTE — MR AVS SNAPSHOT
"              After Visit Summary   6/18/2018    Bel Reardon    MRN: [id number]           Patient Information     Date Of Birth          1940        Visit Information        Provider Department      6/18/2018 2:00 PM Jennie Rubio MD [facility]        Today's Diagnoses     ROMEO (generalized anxiety disorder)    -  1    Mixed hyperlipidemia        Benign essential hypertension           Follow-ups after your visit        Your next 10 appointments already scheduled     Jun 25, 2018  9:30 AM CDT   (Arrive by 9:15 AM)   SHORT with Jennie Rubio MD   [facility] ([facility] )    [address] [place] [address] [phone]            Nov 12, 2018 10:30 AM CST   (Arrive by 10:15 AM)   Return Visit with Kristal Pagan RD   [facility] ([facility] )    [address] [place] [id number] [phone]              Who to contact     If you have questions or need follow up information about today's clinic visit or your schedule please contact [facility] directly at [phone].  Normal or non-critical lab and imaging results will be communicated to you by MyChart, letter or phone within 4 business days after the clinic has received the results. If you do not hear from us within 7 days, please contact the clinic through [employer] or phone. If you have a critical or abnormal lab result, we will notify you by phone as soon as possible.  Submit refill requests through [employer] or call your pharmacy and they will forward the refill request to us. Please allow 3 business days for your refill to be completed.          Additional Information About Your Visit        MyChart Information     [employer] lets you send messages to your doctor, view your test results, renew your prescriptions, schedule appointments and more. To sign up, go to [email or web address] . Click on \"Log in\" on the left side of the screen, which " "will take you to the Welcome page. Then click on \"Sign up Now\" on the right side of the page.     You will be asked to enter the access code listed below, as well as some personal information. Please follow the directions to create your username and password.     Your access code is: [email or web address]  Expires: 2018  1:03 PM     Your access code will  in 90 days. If you need help or a new code, please call your [facility] clinic or [phone].        Care EveryWhere ID     This is your Care EveryWhere ID. This could be used by other organizations to access your [facility] medical records  [id number]        Your Vitals Were     Pulse Temperature Pulse Oximetry BMI (Body Mass Index)          86 98.2  F (36.8  C) (Tympanic) 95% 32.16 kg/m2         Blood Pressure from Last 3 Encounters:   18 (!) 204/94   18 (!) 193/102   18 (!) 220/106    Weight from Last 3 Encounters:   18 173 lb (78.5 kg)   18 174 lb (78.9 kg)   18 174 lb (78.9 kg)              Today, you had the following     No orders found for display         Today's Medication Changes          These changes are accurate as of 18  2:26 PM.  If you have any questions, ask your nurse or doctor.               Start taking these medicines.        Dose/Directions    atorvastatin 10 MG tablet   Commonly known as:  LIPITOR   Used for:  Mixed hyperlipidemia   Started by:  Jennie Rubio MD        Dose:  10 mg   Take 1 tablet (10 mg) by mouth daily   Quantity:  30 tablet   Refills:  1       hydrOXYzine 25 MG capsule   Commonly known as:  VISTARIL   Used for:  ROMEO (generalized anxiety disorder)   Started by:  Jennie Rubio MD        Dose:  25 mg   Take 1 capsule (25 mg) by mouth 3 times daily as needed for anxiety   Quantity:  30 capsule   Refills:  1       order for DME   Used for:  Benign essential hypertension   Started by:  Jennie Rubio MD        Equipment being ordered: BP cuff Automatic arm cuff "   Quantity:  1 Device   Refills:  0            Where to get your medicines      These medications were sent to [id number] - [place], [place] - [address]  [address] [facility], [place] [address]     Phone:  [phone]     atorvastatin 10 MG tablet    hydrOXYzine 25 MG capsule         Some of these will need a paper prescription and others can be bought over the counter.  Ask your nurse if you have questions.     Bring a paper prescription for each of these medications     order for DME                Primary Care Provider Office Phone # Fax #    Jennie Rubio [phone]       [facility] [place] [address]  [place] [address]        Equal Access to Services     SAVANNA ESTRELLA : Hadii karin lopezo So[place]é, waaxda luqadaha, qaybta kaalmada adeegyada, gemini leyva. So [facility] [phone].    ATENCIÓN: Si habla español, tiene a guaman disposición servicios gratuitos de asistencia lingüística. Llame al [phone].    We comply with applicable federal civil rights laws and [place] laws. We do not discriminate on the basis of race, color, national origin, age, disability, sex, sexual orientation, or gender identity.            Thank you!     Thank you for choosing [facility]  for your care. Our goal is always to provide you with excellent care. Hearing back from our patients is one way we can continue to improve our services. Please take a few minutes to complete the written survey that you may receive in the mail after your visit with us. Thank you!             Your Updated Medication List - Protect others around you: Learn how to safely use, store and throw away your medicines at www.disposemymeds.org.          This list is accurate as of 6/18/18  2:26 PM.  Always use your most recent med list.                   Brand Name Dispense Instructions for use Diagnosis    allopurinol 100 MG tablet    ZYLOPRIM    135 tablet    TAKE ONE &  ONE-HALF TABLETS BY MOUTH DAILY    Idiopathic gout, unspecified chronicity, unspecified site       atorvastatin 10 MG tablet    LIPITOR    30 tablet    Take 1 tablet (10 mg) by mouth daily    Mixed hyperlipidemia       blood glucose monitoring lancets     3 each    Use to test blood sugar 1 times daily or as directed.    Type 2 diabetes mellitus without complication, without long-term current use of insulin (H)       blood glucose monitoring meter device kit     1 kit    1 kit by In Vitro route daily Use to test blood sugar 1 times daily or as directed.    Type 2 diabetes mellitus without complication, without long-term current use of insulin (H)       Fish Oil 1000 MG Cpdr      Take 1 capsule by mouth daily        hydrOXYzine 25 MG capsule    VISTARIL    30 capsule    Take 1 capsule (25 mg) by mouth 3 times daily as needed for anxiety    ROMEO (generalized anxiety disorder)       ILEVRO 0.3 % ophthalmic suspension   Generic drug:  nepafenac      PLACE 1 DROP IN RIGHT EYE DAILY 3 DAYS BEFORE SURGERY THEN DAILY FOR 6 WEEKS AFTER SURGERY        levothyroxine 25 MCG tablet    SYNTHROID/LEVOTHROID    90 tablet    TAKE 1 TABLET BY MOUTH EVERY DAY    Other specified hypothyroidism       metFORMIN 500 MG tablet    GLUCOPHAGE    60 tablet    TAKE 1 TABLET BY MOUTH WITH BREAKFAST AND 1 TABLET WITH SUPPER    Type 2 diabetes mellitus without complication, without long-term current use of insulin (H)       ONETOUCH ULTRA test strip   Generic drug:  blood glucose monitoring     100 each    TEST BLOOD SURGARS ONCE A DAY    Type 2 diabetes mellitus without complication (H)       order for DME     1 Device    Equipment being ordered: BP cuff Automatic arm cuff    Benign essential hypertension       tobramycin 0.3 % ophthalmic solution    TOBREX     INSTILL 1 DROP IN RIGHT EYE FOUR TIMES DAILY        valsartan 160 MG tablet    DIOVAN    60 tablet    Take 1 tablet (160 mg) by mouth 2 times daily    Benign essential HTN

## 2018-06-18 NOTE — NURSING NOTE
"Chief Complaint   Patient presents with     Hospital F/U       Initial BP (!) 204/94  Pulse 86  Temp 98.2  F (36.8  C) (Tympanic)  Wt 173 lb (78.5 kg)  SpO2 95%  BMI 32.16 kg/m2 Estimated body mass index is 32.16 kg/(m^2) as calculated from the following:    Height as of 6/13/18: 5' 1.5\" (1.562 m).    Weight as of this encounter: 173 lb (78.5 kg).  Medication Reconciliation: complete    Margarette Zayas MA    "

## 2018-06-18 NOTE — PROGRESS NOTES
SUBJECTIVE:                                                    Bel Reardon is a 78 year old female who presents to clinic today for the following health issues:        ED/UC Followup:    Facility:  [facility]  Date of visit: 6-13-18  Reason for visit: hypertension  Current Status: BP very high. Anxiety is very high. Stressed about today's appointment all day. Took medications this morning but is still high. Feels okay other than her anxiety.       Abnormal Mood Symptoms      Duration: off and on for many years    Description:  Depression: no   Anxiety: YES  Panic attacks: YES     Accompanying signs and symptoms: see PHQ-9 and ROMEO scores. Patient feels she is very full of anxiety. Would like to try an anxiety medication.    History (similar episodes/previous evaluation): has had a ER visit back in April for high BP and had anxiety attack.     Precipitating or alleviating factors: None    Therapies tried and outcome: Prozac (Fluoxetine), Zoloft (Sertraline) and xanax. prozac and zoloft both had terrible side effects. Would like to try klonopin      Problem list and histories reviewed & adjusted, as indicated.  Additional history: as documented    Patient Active Problem List   Diagnosis     Advance care planning     Mixed hyperlipidemia     Postmenopausal bleeding     Type 2 diabetes mellitus without complication, without long-term current use of insulin (H)     Benign essential hypertension     Chronic right-sided low back pain with right-sided sciatica     Combined forms of age-related cataract     Hives     Hypothyroidism, unspecified type     Past Surgical History:   Procedure Laterality Date     COLONOSCOPY N/A 8/21/2015    no further scopes needed.  Surgeon: Yohan Licona DO;  Location: [facility]     DILATION AND CURETTAGE, OPERATIVE HYSTEROSCOPY, COMBINED N/A 1/13/2016    Procedure: COMBINED DILATION AND CURETTAGE, OPERATIVE HYSTEROSCOPY;  Surgeon: Seven Kern MD;  Location: [facility]     left shoulder  1990     nerve damage  s/p trauma; from repetitious work     lumpectomy left breast  1990    benign -- fibroadenoma-left; Facility; [facility]     PHACOEMULSIFICATION WITH STANDARD INTRAOCULAR LENS IMPLANT Left 11/11/2014    Procedure: PHACOEMULSIFICATION WITH STANDARD INTRAOCULAR LENS IMPLANT;  Surgeon: Bobby Aarmbula MD;  Location: [facility]     PHACOEMULSIFICATION WITH STANDARD INTRAOCULAR LENS IMPLANT Right 5/22/2018    Procedure: PHACOEMULSIFICATION WITH STANDARD INTRAOCULAR LENS IMPLANT;  PHACOEMULSIFICATION CATARACT EXTRACTION POSTERIOR CHAMBER LENS RIGHT;  Surgeon: Bert Haddad MD;  Location: [facility]       Social History   Substance Use Topics     Smoking status: Former Smoker     Years: 14.00     Types: Cigarettes     Smokeless tobacco: Never Used      Comment: 20.00 per day; quit 1991     Alcohol use 0.0 oz/week     0 Standard drinks or equivalent per week      Comment: 1 beer weekly     Family History   Problem Relation Age of Onset     C.A.D. Father      Hypertension Father      Other - See Comments Father 78     hyperlipidemia/MI; cause of death     C.A.D. Mother      s/p AMI, pacemaker     DIABETES Mother 95     Hypertension Mother      Other - See Comments Mother      hyperlipidemia/hypothyroid     CANCER Brother 72     lip; cause of death     CANCER Sister 65     lung     Coronary Artery Disease Brother      gallbladder     Other - See Comments Brother 50     AMI x2     Other - See Comments Sister      hyperlipidemia     Hypertension Sister      Other - See Comments Maternal Grandmother      old age     Unknown/Adopted Maternal Grandfather      Other - See Comments Paternal Grandmother 75     old age     Unknown/Adopted Paternal Grandfather          Current Outpatient Prescriptions   Medication Sig Dispense Refill     allopurinol (ZYLOPRIM) 100 MG tablet TAKE ONE & ONE-HALF TABLETS BY MOUTH DAILY 135 tablet 1     atorvastatin (LIPITOR) 10 MG tablet Take 1 tablet (10 mg) by mouth daily 30 tablet 1     blood  glucose monitoring (ONE TOUCH DELICA) lancets Use to test blood sugar 1 times daily or as directed. 3 each 11     blood glucose monitoring (ONE TOUCH ULTRA MINI) meter device kit 1 kit by In Vitro route daily Use to test blood sugar 1 times daily or as directed. 1 kit 0     hydrOXYzine (VISTARIL) 25 MG capsule Take 1 capsule (25 mg) by mouth 3 times daily as needed for anxiety 30 capsule 1     ILEVRO 0.3 % ophthalmic suspension PLACE 1 DROP IN RIGHT EYE DAILY 3 DAYS BEFORE SURGERY THEN DAILY FOR 6 WEEKS AFTER SURGERY  0     levothyroxine (SYNTHROID/LEVOTHROID) 25 MCG tablet TAKE 1 TABLET BY MOUTH EVERY DAY 90 tablet 3     metFORMIN (GLUCOPHAGE) 500 MG tablet TAKE 1 TABLET BY MOUTH WITH BREAKFAST AND 1 TABLET WITH SUPPER 60 tablet 2     Omega-3 Fatty Acids (FISH OIL) 1000 MG CPDR Take 1 capsule by mouth daily       ONE TOUCH ULTRA test strip TEST BLOOD SURGARS ONCE A  each 3     order for DME Equipment being ordered: BP cuff  Automatic arm cuff 1 Device 0     tobramycin (TOBREX) 0.3 % ophthalmic solution INSTILL 1 DROP IN RIGHT EYE FOUR TIMES DAILY  1     valsartan (DIOVAN) 160 MG tablet Take 1 tablet (160 mg) by mouth 2 times daily 60 tablet 2     Allergies   Allergen Reactions     Aspirin      Tightness in chest       Erythromycin Base [Kdc:Yellow Dye+Erythromycin+Brilliant Blue Fcf]      Ezetimibe Other (See Comments)     Aches; Zetia       Fenofibrate Micronized [Fenofibrate]      Aches; Tricor     Levofloxacin      Flu-like symptoms.     Lisinopril Other (See Comments)     headache     No Clinical Screening - See Comments      Fenofibrate nanocrystallized; Aches- Tricor     Pravastatin Sodium Other (See Comments)     Myalgia; Pravachol     Dexamethasone Rash     Maxidex     Dexamethasone Sod Phosphate [Dexamethasone Sodium Phosphate] Rash     Maxidex     Niacin Rash     Rosuvastatin Calcium Rash     crestor       Labs reviewed in EPIC    ROS:  Constitutional, HEENT, cardiovascular, pulmonary, gi and gu  systems are negative, except as otherwise noted.    OBJECTIVE:                                                    BP (!) 204/94  Pulse 86  Temp 98.2  F (36.8  C) (Tympanic)  Wt 173 lb (78.5 kg)  SpO2 95%  BMI 32.16 kg/m2  Body mass index is 32.16 kg/(m^2).  GENERAL APPEARANCE: healthy, alert, no distress and anxious  RESP: lungs clear to auscultation - no rales, rhonchi or wheezes  CV: regular rates and rhythm, normal S1 S2, no S3 or S4 and no murmur, click or rub  PSYCH: mentation appears normal, affect normal/bright and anxious       ASSESSMENT/PLAN:                                                    1. ROMEO (generalized anxiety disorder)  She wanted klonopin, we agreed to vistaril  F/u 1 wk  She also doesn't sleep well, recommend melatonin 3-5 mg qhs  - hydrOXYzine (VISTARIL) 25 MG capsule; Take 1 capsule (25 mg) by mouth 3 times daily as needed for anxiety  Dispense: 30 capsule; Refill: 1    2. Mixed hyperlipidemia  Her siblings tolerate this, she is willing to try  - atorvastatin (LIPITOR) 10 MG tablet; Take 1 tablet (10 mg) by mouth daily  Dispense: 30 tablet; Refill: 1    3. Benign essential hypertension  Improving, she had someone calling her and harrassing her which was stressful, she told him to stop calling and her diastolic bp is better  - order for DME; Equipment being ordered: BP cuff  Automatic arm cuff  Dispense: 1 Device; Refill: 0    Patient was agreeable to this plan and had no further questions.  See Patient Instructions    Jennie Rubio MD  [facility]

## 2018-06-19 ASSESSMENT — ANXIETY QUESTIONNAIRES: GAD7 TOTAL SCORE: 12

## 2018-06-19 ASSESSMENT — PATIENT HEALTH QUESTIONNAIRE - PHQ9: SUM OF ALL RESPONSES TO PHQ QUESTIONS 1-9: 0

## 2018-06-25 ENCOUNTER — OFFICE VISIT (OUTPATIENT)
Dept: FAMILY MEDICINE | Facility: OTHER | Age: 78
End: 2018-06-25
Attending: FAMILY MEDICINE
Payer: COMMERCIAL

## 2018-06-25 VITALS
BODY MASS INDEX: 32.34 KG/M2 | HEART RATE: 90 BPM | OXYGEN SATURATION: 97 % | SYSTOLIC BLOOD PRESSURE: 184 MMHG | WEIGHT: 174 LBS | TEMPERATURE: 97.7 F | DIASTOLIC BLOOD PRESSURE: 94 MMHG

## 2018-06-25 DIAGNOSIS — E78.2 MIXED HYPERLIPIDEMIA: ICD-10-CM

## 2018-06-25 DIAGNOSIS — Z11.59 ENCOUNTER FOR SCREENING FOR OTHER VIRAL DISEASES (CODE): ICD-10-CM

## 2018-06-25 DIAGNOSIS — E11.9 TYPE 2 DIABETES MELLITUS WITHOUT COMPLICATION, WITHOUT LONG-TERM CURRENT USE OF INSULIN (H): Primary | ICD-10-CM

## 2018-06-25 DIAGNOSIS — R74.8 ELEVATED LIVER ENZYMES: ICD-10-CM

## 2018-06-25 DIAGNOSIS — I10 BENIGN ESSENTIAL HYPERTENSION: ICD-10-CM

## 2018-06-25 LAB
ALBUMIN SERPL-MCNC: 3.8 G/DL (ref 3.4–5)
ALP SERPL-CCNC: 71 U/L (ref 40–150)
ALT SERPL W P-5'-P-CCNC: 70 U/L (ref 0–50)
ANION GAP SERPL CALCULATED.3IONS-SCNC: 10 MMOL/L (ref 3–14)
AST SERPL W P-5'-P-CCNC: 53 U/L (ref 0–45)
BILIRUB SERPL-MCNC: 0.4 MG/DL (ref 0.2–1.3)
BUN SERPL-MCNC: 15 MG/DL (ref 7–30)
CALCIUM SERPL-MCNC: 9.2 MG/DL (ref 8.5–10.1)
CHLORIDE SERPL-SCNC: 108 MMOL/L (ref 94–109)
CO2 SERPL-SCNC: 22 MMOL/L (ref 20–32)
CREAT SERPL-MCNC: 0.7 MG/DL (ref 0.52–1.04)
GFR SERPL CREATININE-BSD FRML MDRD: 82 ML/MIN/1.7M2
GLUCOSE SERPL-MCNC: 183 MG/DL (ref 70–99)
POTASSIUM SERPL-SCNC: 3.9 MMOL/L (ref 3.4–5.3)
PROT SERPL-MCNC: 7.5 G/DL (ref 6.8–8.8)
SODIUM SERPL-SCNC: 140 MMOL/L (ref 133–144)

## 2018-06-25 PROCEDURE — 99000 SPECIMEN HANDLING OFFICE-LAB: CPT | Performed by: FAMILY MEDICINE

## 2018-06-25 PROCEDURE — 86706 HEP B SURFACE ANTIBODY: CPT | Mod: ZL | Performed by: FAMILY MEDICINE

## 2018-06-25 PROCEDURE — G0463 HOSPITAL OUTPT CLINIC VISIT: HCPCS

## 2018-06-25 PROCEDURE — 86803 HEPATITIS C AB TEST: CPT | Mod: ZL | Performed by: FAMILY MEDICINE

## 2018-06-25 PROCEDURE — 36415 COLL VENOUS BLD VENIPUNCTURE: CPT | Mod: ZL | Performed by: FAMILY MEDICINE

## 2018-06-25 PROCEDURE — 99214 OFFICE O/P EST MOD 30 MIN: CPT | Performed by: FAMILY MEDICINE

## 2018-06-25 PROCEDURE — 80053 COMPREHEN METABOLIC PANEL: CPT | Mod: ZL | Performed by: FAMILY MEDICINE

## 2018-06-25 ASSESSMENT — ANXIETY QUESTIONNAIRES
IF YOU CHECKED OFF ANY PROBLEMS ON THIS QUESTIONNAIRE, HOW DIFFICULT HAVE THESE PROBLEMS MADE IT FOR YOU TO DO YOUR WORK, TAKE CARE OF THINGS AT HOME, OR GET ALONG WITH OTHER PEOPLE: NOT DIFFICULT AT ALL
4. TROUBLE RELAXING: SEVERAL DAYS
2. NOT BEING ABLE TO STOP OR CONTROL WORRYING: SEVERAL DAYS
5. BEING SO RESTLESS THAT IT IS HARD TO SIT STILL: SEVERAL DAYS
1. FEELING NERVOUS, ANXIOUS, OR ON EDGE: SEVERAL DAYS
GAD7 TOTAL SCORE: 5
6. BECOMING EASILY ANNOYED OR IRRITABLE: NOT AT ALL
3. WORRYING TOO MUCH ABOUT DIFFERENT THINGS: SEVERAL DAYS
7. FEELING AFRAID AS IF SOMETHING AWFUL MIGHT HAPPEN: NOT AT ALL

## 2018-06-25 ASSESSMENT — PAIN SCALES - GENERAL: PAINLEVEL: NO PAIN (0)

## 2018-06-25 NOTE — MR AVS SNAPSHOT
After Visit Summary   6/25/2018    Bel Reardon    MRN: [id number]           Patient Information     Date Of Birth          1940        Visit Information        Provider Department      6/25/2018 9:30 AM Jennie Rubio MD [facility]        Today's Diagnoses     Type 2 diabetes mellitus without complication, without long-term current use of insulin (H)    -  1    Mixed hyperlipidemia        Benign essential hypertension        Elevated liver enzymes        Encounter for screening for other viral diseases (CODE)            Follow-ups after your visit        Your next 10 appointments already scheduled     Aug 13, 2018  9:15 AM CDT   (Arrive by 9:00 AM)   SHORT with Jennie Rubio MD   [facility] ([facility] [place] [facility] )    [address] [place] [id number] [phone]            Nov 12, 2018 10:30 AM CST   (Arrive by 10:15 AM)   Return Visit with Kristal Pagan RD   [facility] ([facility] )    [address] [place] [id number] [phone]              Future tests that were ordered for you today     Open Future Orders        Priority Expected Expires Ordered    US Abdomen Limited Routine  6/25/2019 6/25/2018            Who to contact     If you have questions or need follow up information about today's clinic visit or your schedule please contact [facility] directly at [phone].  Normal or non-critical lab and imaging results will be communicated to you by MyChart, letter or phone within 4 business days after the clinic has received the results. If you do not hear from us within 7 days, please contact the clinic through MyChart or phone. If you have a critical or abnormal lab result, we will notify you by phone as soon as possible.  Submit refill requests through [employer] or call your pharmacy and they will forward the refill request to us. Please allow 3 business days for your refill to be  completed.          Additional Information About Your Visit        Care EveryWhere ID     This is your Care EveryWhere ID. This could be used by other organizations to access your [facility] medical records  [id number]        Your Vitals Were     Pulse Temperature Pulse Oximetry BMI (Body Mass Index)          90 97.7  F (36.5  C) (Tympanic) 97% 32.34 kg/m2         Blood Pressure from Last 3 Encounters:   06/25/18 (!) 184/94   06/18/18 (!) 204/94   06/13/18 (!) 193/102    Weight from Last 3 Encounters:   06/25/18 174 lb (78.9 kg)   06/18/18 173 lb (78.5 kg)   06/13/18 174 lb (78.9 kg)              We Performed the Following     C FOOT EXAM  NO CHARGE     Comprehensive metabolic panel     Hepatitis B Surface Antibody     Hepatitis C antibody        Primary Care Provider Office Phone # Fax #    Jennie Rubio [phone]       [facility] [place] [address]  [place] [address]        Equal Access to Services     SAVANNA ESTRELLA : Hadii aad ku hadasho Soomaali, waaxda luqadaha, qaybta kaalmada adeegyada, waxay idiin hayaan brittany betancourt . So [facility] [phone].    ATENCIÓN: Si habla español, tiene a guaman disposición servicios gratuitos de asistencia lingüística. Llame al [phone].    We comply with applicable federal civil rights laws and [place] laws. We do not discriminate on the basis of race, color, national origin, age, disability, sex, sexual orientation, or gender identity.            Thank you!     Thank you for choosing [facility] [place]  for your care. Our goal is always to provide you with excellent care. Hearing back from our patients is one way we can continue to improve our services. Please take a few minutes to complete the written survey that you may receive in the mail after your visit with us. Thank you!             Your Updated Medication List - Protect others around you: Learn how to safely use, store and throw away your medicines at www.disposemymeds.org.           This list is accurate as of 6/25/18  9:51 AM.  Always use your most recent med list.                   Brand Name Dispense Instructions for use Diagnosis    allopurinol 100 MG tablet    ZYLOPRIM    135 tablet    TAKE ONE & ONE-HALF TABLETS BY MOUTH DAILY    Idiopathic gout, unspecified chronicity, unspecified site       atorvastatin 10 MG tablet    LIPITOR    30 tablet    Take 1 tablet (10 mg) by mouth daily    Mixed hyperlipidemia       blood glucose monitoring lancets     3 each    Use to test blood sugar 1 times daily or as directed.    Type 2 diabetes mellitus without complication, without long-term current use of insulin (H)       blood glucose monitoring meter device kit     1 kit    1 kit by In Vitro route daily Use to test blood sugar 1 times daily or as directed.    Type 2 diabetes mellitus without complication, without long-term current use of insulin (H)       Fish Oil 1000 MG Cpdr      Take 1 capsule by mouth daily        hydrOXYzine 25 MG capsule    VISTARIL    30 capsule    Take 1 capsule (25 mg) by mouth 3 times daily as needed for anxiety    ROMEO (generalized anxiety disorder)       ILEVRO 0.3 % ophthalmic suspension   Generic drug:  nepafenac      PLACE 1 DROP IN RIGHT EYE DAILY 3 DAYS BEFORE SURGERY THEN DAILY FOR 6 WEEKS AFTER SURGERY        levothyroxine 25 MCG tablet    SYNTHROID/LEVOTHROID    90 tablet    TAKE 1 TABLET BY MOUTH EVERY DAY    Other specified hypothyroidism       metFORMIN 500 MG tablet    GLUCOPHAGE    60 tablet    TAKE 1 TABLET BY MOUTH WITH BREAKFAST AND 1 TABLET WITH SUPPER    Type 2 diabetes mellitus without complication, without long-term current use of insulin (H)       ONETOUCH ULTRA test strip   Generic drug:  blood glucose monitoring     100 each    TEST BLOOD SURGARS ONCE A DAY    Type 2 diabetes mellitus without complication (H)       order for DME     1 Device    Equipment being ordered: BP cuff Automatic arm cuff    Benign essential hypertension        tobramycin 0.3 % ophthalmic solution    TOBREX     INSTILL 1 DROP IN RIGHT EYE FOUR TIMES DAILY        valsartan 160 MG tablet    DIOVAN    60 tablet    Take 1 tablet (160 mg) by mouth 2 times daily    Benign essential HTN

## 2018-06-25 NOTE — NURSING NOTE
"Chief Complaint   Patient presents with     Hypertension     Diabetes       Initial BP (!) 184/94  Pulse 90  Temp 97.7  F (36.5  C) (Tympanic)  Wt 174 lb (78.9 kg)  SpO2 97%  BMI 32.34 kg/m2 Estimated body mass index is 32.34 kg/(m^2) as calculated from the following:    Height as of 6/13/18: 5' 1.5\" (1.562 m).    Weight as of this encounter: 174 lb (78.9 kg).  Medication Reconciliation: complete    Margarette Zayas MA    "

## 2018-06-25 NOTE — PROGRESS NOTES
SUBJECTIVE:                                                    Bel Reardon is a 78 year old female who presents to clinic today for the following health issues:      Diabetes Follow-up    Patient is checking blood sugars: once daily.  Results are as follows:         am - 133-167    Diabetic concerns: None     Symptoms of hypoglycemia (low blood sugar): none     Paresthesias (numbness or burning in feet) or sores: No     Date of last diabetic eye exam: 1-4-18    Hyperlipidemia Follow-Up      Rate your low fat/cholesterol diet?: good    Taking statin?  Has not started. Wants to read about them before she starts taking    Other lipid medications/supplements?:  Fish oil/Omega 3, dose 1,000 mg without side effects    Hypertension Follow-up      Outpatient blood pressures are not being checked.    Low Salt Diet: low salt    BP Readings from Last 2 Encounters:   06/18/18 (!) 204/94   06/13/18 (!) 193/102     Hemoglobin A1C (%)   Date Value   05/10/2018 7.4 (H)   02/15/2018 6.8 (H)     LDL Cholesterol Calculated (mg/dL)   Date Value   11/01/2017 131 (H)   03/20/2017 161 (H)       Amount of exercise or physical activity: None    Problems taking medications regularly: No    Medication side effects: none    Diet: weight watchers      Problem list and histories reviewed & adjusted, as indicated.  Additional history: as documented    Patient Active Problem List   Diagnosis     Advance care planning     Mixed hyperlipidemia     Postmenopausal bleeding     Type 2 diabetes mellitus without complication, without long-term current use of insulin (H)     Benign essential hypertension     Chronic right-sided low back pain with right-sided sciatica     Combined forms of age-related cataract     Hives     Hypothyroidism, unspecified type     Past Surgical History:   Procedure Laterality Date     COLONOSCOPY N/A 8/21/2015    no further scopes needed.  Surgeon: Yohan Licona DO;  Location: [facility]     DILATION AND CURETTAGE,  OPERATIVE HYSTEROSCOPY, COMBINED N/A 1/13/2016    Procedure: COMBINED DILATION AND CURETTAGE, OPERATIVE HYSTEROSCOPY;  Surgeon: Seven Kern MD;  Location: [facility]     left shoulder  1990    nerve damage  s/p trauma; from repetitious work     lumpectomy left breast  1990    benign -- fibroadenoma-left; Facility; [facility]     PHACOEMULSIFICATION WITH STANDARD INTRAOCULAR LENS IMPLANT Left 11/11/2014    Procedure: PHACOEMULSIFICATION WITH STANDARD INTRAOCULAR LENS IMPLANT;  Surgeon: Bboby Arambula MD;  Location: [facility]     PHACOEMULSIFICATION WITH STANDARD INTRAOCULAR LENS IMPLANT Right 5/22/2018    Procedure: PHACOEMULSIFICATION WITH STANDARD INTRAOCULAR LENS IMPLANT;  PHACOEMULSIFICATION CATARACT EXTRACTION POSTERIOR CHAMBER LENS RIGHT;  Surgeon: Bert Haddad MD;  Location: [facility]       Social History   Substance Use Topics     Smoking status: Former Smoker     Years: 14.00     Types: Cigarettes     Smokeless tobacco: Never Used      Comment: 20.00 per day; quit 1991     Alcohol use 0.0 oz/week     0 Standard drinks or equivalent per week      Comment: 1 beer weekly     Family History   Problem Relation Age of Onset     C.A.D. Father      Hypertension Father      Other - See Comments Father 78     hyperlipidemia/MI; cause of death     C.A.D. Mother      s/p AMI, pacemaker     Diabetes Mother 95     Hypertension Mother      Other - See Comments Mother      hyperlipidemia/hypothyroid     Cancer Brother 72     lip; cause of death     Cancer Sister 65     lung     Coronary Artery Disease Brother      gallbladder     Other - See Comments Brother 50     AMI x2     Other - See Comments Sister      hyperlipidemia     Hypertension Sister      Other - See Comments Maternal Grandmother      old age     Unknown/Adopted Maternal Grandfather      Other - See Comments Paternal Grandmother 75     old age     Unknown/Adopted Paternal Grandfather          Current Outpatient Prescriptions   Medication Sig Dispense Refill      allopurinol (ZYLOPRIM) 100 MG tablet TAKE ONE & ONE-HALF TABLETS BY MOUTH DAILY 135 tablet 1     blood glucose monitoring (ONE TOUCH DELICA) lancets Use to test blood sugar 1 times daily or as directed. 3 each 11     blood glucose monitoring (ONE TOUCH ULTRA MINI) meter device kit 1 kit by In Vitro route daily Use to test blood sugar 1 times daily or as directed. 1 kit 0     hydrOXYzine (VISTARIL) 25 MG capsule Take 1 capsule (25 mg) by mouth 3 times daily as needed for anxiety 30 capsule 1     ILEVRO 0.3 % ophthalmic suspension PLACE 1 DROP IN RIGHT EYE DAILY 3 DAYS BEFORE SURGERY THEN DAILY FOR 6 WEEKS AFTER SURGERY  0     levothyroxine (SYNTHROID/LEVOTHROID) 25 MCG tablet TAKE 1 TABLET BY MOUTH EVERY DAY 90 tablet 3     metFORMIN (GLUCOPHAGE) 500 MG tablet TAKE 1 TABLET BY MOUTH WITH BREAKFAST AND 1 TABLET WITH SUPPER 60 tablet 2     Omega-3 Fatty Acids (FISH OIL) 1000 MG CPDR Take 1 capsule by mouth daily       ONE TOUCH ULTRA test strip TEST BLOOD SURGARS ONCE A  each 3     order for DME Equipment being ordered: BP cuff  Automatic arm cuff 1 Device 0     tobramycin (TOBREX) 0.3 % ophthalmic solution INSTILL 1 DROP IN RIGHT EYE FOUR TIMES DAILY  1     valsartan (DIOVAN) 160 MG tablet Take 1 tablet (160 mg) by mouth 2 times daily 60 tablet 2     atorvastatin (LIPITOR) 10 MG tablet Take 1 tablet (10 mg) by mouth daily (Patient not taking: Reported on 6/25/2018) 30 tablet 1     Allergies   Allergen Reactions     Aspirin      Tightness in chest       Erythromycin Base [Kdc:Yellow Dye+Erythromycin+Brilliant Blue Fcf]      Ezetimibe Other (See Comments)     Aches; Zetia       Fenofibrate Micronized [Fenofibrate]      Aches; Tricor     Levofloxacin      Flu-like symptoms.     Lisinopril Other (See Comments)     headache     No Clinical Screening - See Comments      Fenofibrate nanocrystallized; Aches- Tricor     Pravastatin Sodium Other (See Comments)     Myalgia; Pravachol     Dexamethasone Rash     Maxidex      Dexamethasone Sod Phosphate [Dexamethasone Sodium Phosphate] Rash     Maxidex     Niacin Rash     Rosuvastatin Calcium Rash     crestor       Labs reviewed in EPIC    ROS:  Constitutional, HEENT, cardiovascular, pulmonary, gi and gu systems are negative, except as otherwise noted.    OBJECTIVE:                                                    BP (!) 184/94  Pulse 90  Temp 97.7  F (36.5  C) (Tympanic)  Wt 174 lb (78.9 kg)  SpO2 97%  BMI 32.34 kg/m2  Body mass index is 32.34 kg/(m^2).  GENERAL APPEARANCE: healthy, alert and no distress  RESP: lungs clear to auscultation - no rales, rhonchi or wheezes  CV: regular rates and rhythm, normal S1 S2, no S3 or S4 and no murmur, click or rub  ABDOMEN: soft, nontender, without hepatosplenomegaly or masses and bowel sounds normal  SKIN: no suspicious lesions or rashes  DIABETIC FOOT EXAM: normal DP and PT pulses, no trophic changes or ulcerative lesions, normal sensory exam and normal monofilament exam  PSYCH: mentation appears normal and affect normal/bright       ASSESSMENT/PLAN:                                                    1. Type 2 diabetes mellitus without complication, without long-term current use of insulin (H)  F/u 6 wks  - C FOOT EXAM  NO CHARGE    2. Mixed hyperlipidemia  Come fasting, she is going to start lipitor as her brother did this    3. Benign essential hypertension  Improving, encouraged to take both talbets BID    4. Elevated liver enzymes  Chronic, likely fatty liver but nothing has been done to check this lately  - US Abdomen Limited; Future  - Hepatitis C antibody  - Hepatitis B Surface Antibody  - Comprehensive metabolic panel    5. Encounter for screening for other viral diseases (CODE)     - Hepatitis B Surface Antibody    Patient was agreeable to this plan and had no further questions.  See Patient Instructions    Jennie Rubio MD  [facility]

## 2018-06-26 ENCOUNTER — HOSPITAL ENCOUNTER (OUTPATIENT)
Dept: ULTRASOUND IMAGING | Facility: HOSPITAL | Age: 78
Discharge: HOME | End: 2018-06-26
Attending: FAMILY MEDICINE | Admitting: FAMILY MEDICINE
Payer: MEDICARE

## 2018-06-26 DIAGNOSIS — R74.8 ELEVATED LIVER ENZYMES: ICD-10-CM

## 2018-06-26 LAB
HBV SURFACE AB SERPL IA-ACNC: 0 M[IU]/ML
HCV AB SERPL QL IA: NONREACTIVE

## 2018-06-26 PROCEDURE — 76705 ECHO EXAM OF ABDOMEN: CPT | Mod: TC

## 2018-06-26 ASSESSMENT — ANXIETY QUESTIONNAIRES: GAD7 TOTAL SCORE: 5

## 2018-06-26 ASSESSMENT — PATIENT HEALTH QUESTIONNAIRE - PHQ9: SUM OF ALL RESPONSES TO PHQ QUESTIONS 1-9: 1

## 2018-07-18 ENCOUNTER — TELEPHONE (OUTPATIENT)
Dept: FAMILY MEDICINE | Facility: OTHER | Age: 78
End: 2018-07-18

## 2018-07-18 DIAGNOSIS — I10 BENIGN ESSENTIAL HYPERTENSION: Primary | ICD-10-CM

## 2018-07-18 RX ORDER — LOSARTAN POTASSIUM 100 MG/1
100 TABLET ORAL DAILY
Qty: 30 TABLET | Refills: 1 | Status: SHIPPED | OUTPATIENT
Start: 2018-07-18 | End: 2018-08-31

## 2018-08-01 DIAGNOSIS — E78.2 MIXED HYPERLIPIDEMIA: ICD-10-CM

## 2018-08-01 RX ORDER — ATORVASTATIN CALCIUM 10 MG/1
TABLET, FILM COATED ORAL
Qty: 30 TABLET | Refills: 0 | Status: SHIPPED | OUTPATIENT
Start: 2018-08-01 | End: 2018-09-07

## 2018-08-01 NOTE — TELEPHONE ENCOUNTER
Lipitor      Last Written Prescription Date:  6/18/18  Last Fill Quantity: 30,   # refills: 1  Last Office Visit: 6/25/18  Future Office visit:    Next 5 appointments (look out 90 days)     Aug 13, 2018  9:15 AM CDT   (Arrive by 9:00 AM)   SHORT with Jennie Rubio MD   [facility] [place] ([facility] )    [address] [place] [address]   [phone]

## 2018-08-13 ENCOUNTER — OFFICE VISIT (OUTPATIENT)
Dept: FAMILY MEDICINE | Facility: OTHER | Age: 78
End: 2018-08-13
Attending: FAMILY MEDICINE
Payer: MEDICARE

## 2018-08-13 VITALS
TEMPERATURE: 97.1 F | OXYGEN SATURATION: 97 % | SYSTOLIC BLOOD PRESSURE: 168 MMHG | HEART RATE: 76 BPM | WEIGHT: 170 LBS | DIASTOLIC BLOOD PRESSURE: 86 MMHG | BODY MASS INDEX: 31.6 KG/M2

## 2018-08-13 DIAGNOSIS — E03.9 HYPOTHYROIDISM, UNSPECIFIED TYPE: ICD-10-CM

## 2018-08-13 DIAGNOSIS — E11.9 TYPE 2 DIABETES MELLITUS WITHOUT COMPLICATION, WITHOUT LONG-TERM CURRENT USE OF INSULIN (H): Primary | ICD-10-CM

## 2018-08-13 DIAGNOSIS — E78.2 MIXED HYPERLIPIDEMIA: ICD-10-CM

## 2018-08-13 DIAGNOSIS — R53.83 OTHER FATIGUE: ICD-10-CM

## 2018-08-13 DIAGNOSIS — E11.9 TYPE 2 DIABETES MELLITUS WITHOUT COMPLICATION, WITHOUT LONG-TERM CURRENT USE OF INSULIN (H): ICD-10-CM

## 2018-08-13 DIAGNOSIS — R74.8 ELEVATED LIVER ENZYMES: ICD-10-CM

## 2018-08-13 DIAGNOSIS — I10 BENIGN ESSENTIAL HYPERTENSION: ICD-10-CM

## 2018-08-13 LAB
ALBUMIN SERPL-MCNC: 3.8 G/DL (ref 3.4–5)
ALP SERPL-CCNC: 62 U/L (ref 40–150)
ALT SERPL W P-5'-P-CCNC: 44 U/L (ref 0–50)
ANION GAP SERPL CALCULATED.3IONS-SCNC: 15 MMOL/L (ref 3–14)
AST SERPL W P-5'-P-CCNC: 42 U/L (ref 0–45)
BILIRUB SERPL-MCNC: 0.6 MG/DL (ref 0.2–1.3)
BUN SERPL-MCNC: 15 MG/DL (ref 7–30)
CALCIUM SERPL-MCNC: 9 MG/DL (ref 8.5–10.1)
CHLORIDE SERPL-SCNC: 107 MMOL/L (ref 94–109)
CHOLEST SERPL-MCNC: 237 MG/DL
CO2 SERPL-SCNC: 18 MMOL/L (ref 20–32)
CREAT SERPL-MCNC: 0.79 MG/DL (ref 0.52–1.04)
EST. AVERAGE GLUCOSE BLD GHB EST-MCNC: 163 MG/DL
GFR SERPL CREATININE-BSD FRML MDRD: 71 ML/MIN/1.7M2
GLUCOSE SERPL-MCNC: 144 MG/DL (ref 70–99)
HBA1C MFR BLD: 7.3 % (ref 0–5.6)
HDLC SERPL-MCNC: 50 MG/DL
LDLC SERPL CALC-MCNC: 135 MG/DL
NONHDLC SERPL-MCNC: 187 MG/DL
POTASSIUM SERPL-SCNC: 4.1 MMOL/L (ref 3.4–5.3)
PROT SERPL-MCNC: 7.5 G/DL (ref 6.8–8.8)
SODIUM SERPL-SCNC: 140 MMOL/L (ref 133–144)
TRIGL SERPL-MCNC: 259 MG/DL
TSH SERPL DL<=0.005 MIU/L-ACNC: 3.24 MU/L (ref 0.4–4)
VIT B12 SERPL-MCNC: 411 PG/ML (ref 193–986)

## 2018-08-13 PROCEDURE — 80061 LIPID PANEL: CPT | Mod: ZL | Performed by: FAMILY MEDICINE

## 2018-08-13 PROCEDURE — 82607 VITAMIN B-12: CPT | Mod: ZL | Performed by: FAMILY MEDICINE

## 2018-08-13 PROCEDURE — 83036 HEMOGLOBIN GLYCOSYLATED A1C: CPT | Mod: ZL | Performed by: FAMILY MEDICINE

## 2018-08-13 PROCEDURE — 40000788 ZZHCL STATISTIC ESTIMATED AVERAGE GLUCOSE: Mod: ZL | Performed by: FAMILY MEDICINE

## 2018-08-13 PROCEDURE — 84443 ASSAY THYROID STIM HORMONE: CPT | Mod: ZL | Performed by: FAMILY MEDICINE

## 2018-08-13 PROCEDURE — 36415 COLL VENOUS BLD VENIPUNCTURE: CPT | Mod: ZL | Performed by: FAMILY MEDICINE

## 2018-08-13 PROCEDURE — 99214 OFFICE O/P EST MOD 30 MIN: CPT | Performed by: FAMILY MEDICINE

## 2018-08-13 PROCEDURE — G0463 HOSPITAL OUTPT CLINIC VISIT: HCPCS

## 2018-08-13 PROCEDURE — 80053 COMPREHEN METABOLIC PANEL: CPT | Mod: ZL | Performed by: FAMILY MEDICINE

## 2018-08-13 PROCEDURE — 99000 SPECIMEN HANDLING OFFICE-LAB: CPT | Performed by: FAMILY MEDICINE

## 2018-08-13 ASSESSMENT — ANXIETY QUESTIONNAIRES
4. TROUBLE RELAXING: NOT AT ALL
1. FEELING NERVOUS, ANXIOUS, OR ON EDGE: SEVERAL DAYS
7. FEELING AFRAID AS IF SOMETHING AWFUL MIGHT HAPPEN: NOT AT ALL
2. NOT BEING ABLE TO STOP OR CONTROL WORRYING: NOT AT ALL
3. WORRYING TOO MUCH ABOUT DIFFERENT THINGS: SEVERAL DAYS
5. BEING SO RESTLESS THAT IT IS HARD TO SIT STILL: NOT AT ALL
GAD7 TOTAL SCORE: 2
6. BECOMING EASILY ANNOYED OR IRRITABLE: NOT AT ALL
IF YOU CHECKED OFF ANY PROBLEMS ON THIS QUESTIONNAIRE, HOW DIFFICULT HAVE THESE PROBLEMS MADE IT FOR YOU TO DO YOUR WORK, TAKE CARE OF THINGS AT HOME, OR GET ALONG WITH OTHER PEOPLE: NOT DIFFICULT AT ALL

## 2018-08-13 ASSESSMENT — PAIN SCALES - GENERAL: PAINLEVEL: NO PAIN (0)

## 2018-08-13 NOTE — NURSING NOTE
"Chief Complaint   Patient presents with     Hypertension     Diabetes     Lipids     Thyroid Problem       Initial BP (!) 190/94  Pulse 76  Temp 97.1  F (36.2  C) (Tympanic)  Wt 170 lb (77.1 kg)  SpO2 97%  BMI 31.6 kg/m2 Estimated body mass index is 31.6 kg/(m^2) as calculated from the following:    Height as of 6/13/18: 5' 1.5\" (1.562 m).    Weight as of this encounter: 170 lb (77.1 kg).  Medication Reconciliation: complete    Margarette Zayas MA    "

## 2018-08-13 NOTE — PROGRESS NOTES
SUBJECTIVE:                                                    Bel Reardon is a 78 year old female who presents to clinic today for the following health issues:      Diabetes Follow-up    Patient is checking blood sugars: once daily.  Results are as follows:         am - 140    Diabetic concerns: None     Symptoms of hypoglycemia (low blood sugar): none     Paresthesias (numbness or burning in feet) or sores: No     Date of last diabetic eye exam: 1-4-18    Diabetes Management Resources    Hyperlipidemia Follow-Up      Rate your low fat/cholesterol diet?: good    Taking statin? Stopped due to side effects    Other lipid medications/supplements?:  Fish oil/Omega 3, dose 1,000mg without side effects    Hypertension Follow-up      Outpatient blood pressures are not being checked.    Low Salt Diet: no added salt    BP Readings from Last 2 Encounters:   06/25/18 (!) 184/94   06/18/18 (!) 204/94     Hemoglobin A1C (%)   Date Value   05/10/2018 7.4 (H)   02/15/2018 6.8 (H)     LDL Cholesterol Calculated (mg/dL)   Date Value   11/01/2017 131 (H)   03/20/2017 161 (H)     Hypothyroidism Follow-up      Since last visit, patient describes the following symptoms: weight loss of 5 lbs and anxiety      Amount of exercise or physical activity: 4-5 days/week for an average of 30-45 minutes    Problems taking medications regularly: No    Medication side effects: none    Diet: regular (no restrictions)    Quite anxious today, she [place] house on [place]th [place]  Her brother has been checking her BP and it has been in the 140's/80's    Problem list and histories reviewed & adjusted, as indicated.  Additional history: as documented    Patient Active Problem List   Diagnosis     Advance care planning     Mixed hyperlipidemia     Postmenopausal bleeding     Type 2 diabetes mellitus without complication, without long-term current use of insulin (H)     Benign essential hypertension     Chronic  right-sided low back pain with right-sided sciatica     Combined forms of age-related cataract     Hives     Hypothyroidism, unspecified type     Past Surgical History:   Procedure Laterality Date     COLONOSCOPY N/A 8/21/2015    no further scopes needed.  Surgeon: Yohan Licona DO;  Location: [facility]     DILATION AND CURETTAGE, OPERATIVE HYSTEROSCOPY, COMBINED N/A 1/13/2016    Procedure: COMBINED DILATION AND CURETTAGE, OPERATIVE HYSTEROSCOPY;  Surgeon: Seven Kern MD;  Location: [facility]     left shoulder  1990    nerve damage  s/p trauma; from repetitious work     lumpectomy left breast  1990    benign -- fibroadenoma-left; Facility; [facility]     PHACOEMULSIFICATION WITH STANDARD INTRAOCULAR LENS IMPLANT Left 11/11/2014    Procedure: PHACOEMULSIFICATION WITH STANDARD INTRAOCULAR LENS IMPLANT;  Surgeon: Bobby Arambula MD;  Location: [facility]     PHACOEMULSIFICATION WITH STANDARD INTRAOCULAR LENS IMPLANT Right 5/22/2018    Procedure: PHACOEMULSIFICATION WITH STANDARD INTRAOCULAR LENS IMPLANT;  PHACOEMULSIFICATION CATARACT EXTRACTION POSTERIOR CHAMBER LENS RIGHT;  Surgeon: Bert Haddad MD;  Location: [facility]       Social History   Substance Use Topics     Smoking status: Former Smoker     Years: 14.00     Types: Cigarettes     Smokeless tobacco: Never Used      Comment: 20.00 per day; quit 1991     Alcohol use 0.0 oz/week     0 Standard drinks or equivalent per week      Comment: 1 beer weekly     Family History   Problem Relation Age of Onset     C.A.D. Father      Hypertension Father      Other - See Comments Father 78     hyperlipidemia/MI; cause of death     C.A.D. Mother      s/p AMI, pacemaker     Diabetes Mother 95     Hypertension Mother      Other - See Comments Mother      hyperlipidemia/hypothyroid     Cancer Brother 72     lip; cause of death     Cancer Sister 65     lung     Coronary Artery Disease Brother      gallbladder     Other - See Comments Brother 50     AMI x2     Other - See Comments Sister       hyperlipidemia     Hypertension Sister      Other - See Comments Maternal Grandmother      old age     Unknown/Adopted Maternal Grandfather      Other - See Comments Paternal Grandmother 75     old age     Unknown/Adopted Paternal Grandfather          Current Outpatient Prescriptions   Medication Sig Dispense Refill     allopurinol (ZYLOPRIM) 100 MG tablet TAKE ONE & ONE-HALF TABLETS (150MG) BY MOUTH DAILY 135 tablet 0     blood glucose monitoring (ONE TOUCH DELICA) lancets Use to test blood sugar 1 times daily or as directed. 3 each 11     blood glucose monitoring (ONE TOUCH ULTRA MINI) meter device kit 1 kit by In Vitro route daily Use to test blood sugar 1 times daily or as directed. 1 kit 0     hydrOXYzine (VISTARIL) 25 MG capsule Take 1 capsule (25 mg) by mouth 3 times daily as needed for anxiety 30 capsule 1     levothyroxine (SYNTHROID/LEVOTHROID) 25 MCG tablet TAKE 1 TABLET BY MOUTH EVERY DAY 90 tablet 3     losartan (COZAAR) 100 MG tablet Take 1 tablet (100 mg) by mouth daily 30 tablet 1     metFORMIN (GLUCOPHAGE) 500 MG tablet TAKE 1 TABLET BY MOUTH WITH BREAKFAST AND 1 TABLET WITH SUPPER 60 tablet 2     Omega-3 Fatty Acids (FISH OIL) 1000 MG CPDR Take 1 capsule by mouth daily       ONETOUCH ULTRA test strip TEST BLOOD SUGARS ONCE DAILY 100 each 3     order for DME Equipment being ordered: BP cuff  Automatic arm cuff 1 Device 0     valsartan (DIOVAN) 160 MG tablet Take 1 tablet (160 mg) by mouth 2 times daily 60 tablet 2     atorvastatin (LIPITOR) 10 MG tablet TAKE 1 TABLET DAILY BY MOUTH (Patient not taking: Reported on 8/13/2018) 30 tablet 0     Allergies   Allergen Reactions     Aspirin      Tightness in chest       Erythromycin Base [Kdc:Yellow Dye+Erythromycin+Brilliant Blue Fcf]      Ezetimibe Other (See Comments)     Aches; Zetia       Fenofibrate Micronized [Fenofibrate]      Aches; Tricor     Levofloxacin      Flu-like symptoms.     Lisinopril Other (See Comments)     headache     No Clinical  Screening - See Comments      Fenofibrate nanocrystallized; Aches- Tricor     Pravastatin Sodium Other (See Comments)     Myalgia; Pravachol     Dexamethasone Rash     Maxidex     Dexamethasone Sod Phosphate [Dexamethasone Sodium Phosphate] Rash     Maxidex     Niacin Rash     Rosuvastatin Calcium Rash     crestor       Labs reviewed in EPIC    ROS:  Constitutional, HEENT, cardiovascular, pulmonary, gi and gu systems are negative, except as otherwise noted.    OBJECTIVE:                                                    BP (!) 190/94  Pulse 76  Temp 97.1  F (36.2  C) (Tympanic)  Wt 170 lb (77.1 kg)  SpO2 97%  BMI 31.6 kg/m2  Body mass index is 31.6 kg/(m^2).  GENERAL APPEARANCE: healthy, alert and no distress  RESP: lungs clear to auscultation - no rales, rhonchi or wheezes  CV: regular rates and rhythm, normal S1 S2, no S3 or S4 and no murmur, click or rub  ABDOMEN: soft, nontender, without hepatosplenomegaly or masses and bowel sounds normal  PSYCH: mentation appears normal, affect normal/bright and anxious       ASSESSMENT/PLAN:                                                    1. Type 2 diabetes mellitus without complication, without long-term current use of insulin (H)  F/u 3 mos in her new location    2. Benign essential hypertension  definate white coat HTN and didn't sleep last night    3. Mixed hyperlipidemia  Fasting today  - Lipid Profile (Chol, Trig, HDL, LDL calc); Future    4. Hypothyroidism, unspecified type    - TSH with free T4 reflex; Future    5. Other fatigue    - Vitamin B12    Patient was agreeable to this plan and had no further questions.  See Patient Instructions    Jennie Rubio MD  [facility]

## 2018-08-13 NOTE — MR AVS SNAPSHOT
After Visit Summary   8/13/2018    Bel Reardon    MRN: [id number]           Patient Information     Date Of Birth          1940        Visit Information        Provider Department      8/13/2018 9:15 AM Jennie Rubio MD [facility]        Today's Diagnoses     Type 2 diabetes mellitus without complication, without long-term current use of insulin (H)    -  1    Benign essential hypertension        Mixed hyperlipidemia        Hypothyroidism, unspecified type        Other fatigue        Elevated liver enzymes           Follow-ups after your visit        Your next 10 appointments already scheduled     Nov 12, 2018 10:30 AM CST   (Arrive by 10:15 AM)   Return Visit with Kristal Pagan RD   [facility] ([facility] )    [address] [place] [id number] [phone]              Who to contact     If you have questions or need follow up information about today's clinic visit or your schedule please contact [facility] directly at [phone].  Normal or non-critical lab and imaging results will be communicated to you by MyChart, letter or phone within 4 business days after the clinic has received the results. If you do not hear from us within 7 days, please contact the clinic through MyChart or phone. If you have a critical or abnormal lab result, we will notify you by phone as soon as possible.  Submit refill requests through [employer] or call your pharmacy and they will forward the refill request to us. Please allow 3 business days for your refill to be completed.          Additional Information About Your Visit        Care EveryWhere ID     This is your Care EveryWhere ID. This could be used by other organizations to access your [place] medical records  [id number]        Your Vitals Were     Pulse Temperature Pulse Oximetry BMI (Body Mass Index)          76 97.1  F (36.2  C) (Tympanic) 97% 31.6 kg/m2         Blood Pressure from  Last 3 Encounters:   08/13/18 (!) 190/94   06/25/18 (!) 184/94   06/18/18 (!) 204/94    Weight from Last 3 Encounters:   08/13/18 170 lb (77.1 kg)   06/25/18 174 lb (78.9 kg)   06/18/18 173 lb (78.5 kg)              We Performed the Following     Comprehensive metabolic panel     Vitamin B12        Primary Care Provider Office Phone # Fax #    Jennie Rubio [phone]       [facility] [place] [address]  [place] [address]        Equal Access to Services     [facility]: Hadii aad ku hadasho Soomaali, waaxda luqadaha, qaybta kaalmada adeegyada, gemini rivas hayaan adeiain betancourt . So [facility] [phone].    ATENCIÓN: Si habla español, tiene a guaman disposición servicios gratuitos de asistencia lingüística. Llame al [phone].    We comply with applicable federal civil rights laws and [place] laws. We do not discriminate on the basis of race, color, national origin, age, disability, sex, sexual orientation, or gender identity.            Thank you!     Thank you for choosing [facility]  for your care. Our goal is always to provide you with excellent care. Hearing back from our patients is one way we can continue to improve our services. Please take a few minutes to complete the written survey that you may receive in the mail after your visit with us. Thank you!             Your Updated Medication List - Protect others around you: Learn how to safely use, store and throw away your medicines at www.disposemymeds.org.          This list is accurate as of 8/13/18  9:31 AM.  Always use your most recent med list.                   Brand Name Dispense Instructions for use Diagnosis    allopurinol 100 MG tablet    ZYLOPRIM    135 tablet    TAKE ONE & ONE-HALF TABLETS (150MG) BY MOUTH DAILY    Idiopathic gout, unspecified chronicity, unspecified site       atorvastatin 10 MG tablet    LIPITOR    30 tablet    TAKE 1 TABLET DAILY BY MOUTH    Mixed hyperlipidemia       blood  glucose monitoring lancets     3 each    Use to test blood sugar 1 times daily or as directed.    Type 2 diabetes mellitus without complication, without long-term current use of insulin (H)       blood glucose monitoring meter device kit     1 kit    1 kit by In Vitro route daily Use to test blood sugar 1 times daily or as directed.    Type 2 diabetes mellitus without complication, without long-term current use of insulin (H)       Fish Oil 1000 MG Cpdr      Take 1 capsule by mouth daily        hydrOXYzine 25 MG capsule    VISTARIL    30 capsule    Take 1 capsule (25 mg) by mouth 3 times daily as needed for anxiety    ROMEO (generalized anxiety disorder)       levothyroxine 25 MCG tablet    SYNTHROID/LEVOTHROID    90 tablet    TAKE 1 TABLET BY MOUTH EVERY DAY    Other specified hypothyroidism       losartan 100 MG tablet    COZAAR    30 tablet    Take 1 tablet (100 mg) by mouth daily    Benign essential hypertension       metFORMIN 500 MG tablet    GLUCOPHAGE    60 tablet    TAKE 1 TABLET BY MOUTH WITH BREAKFAST AND 1 TABLET WITH SUPPER    Type 2 diabetes mellitus without complication, without long-term current use of insulin (H)       ONETOUCH ULTRA test strip   Generic drug:  blood glucose monitoring     100 each    TEST BLOOD SUGARS ONCE DAILY    Type 2 diabetes mellitus without complication (H)       order for DME     1 Device    Equipment being ordered: BP cuff Automatic arm cuff    Benign essential hypertension       valsartan 160 MG tablet    DIOVAN    60 tablet    Take 1 tablet (160 mg) by mouth 2 times daily    Benign essential HTN

## 2018-08-14 ASSESSMENT — ANXIETY QUESTIONNAIRES: GAD7 TOTAL SCORE: 2

## 2018-08-14 ASSESSMENT — PATIENT HEALTH QUESTIONNAIRE - PHQ9: SUM OF ALL RESPONSES TO PHQ QUESTIONS 1-9: 1

## 2018-08-31 DIAGNOSIS — I10 BENIGN ESSENTIAL HYPERTENSION: ICD-10-CM

## 2018-08-31 NOTE — TELEPHONE ENCOUNTER
Losartan  Last Written Prescription Date: 7/18/18  Last Fill Quantity: 30 # of Refills: 1  Last Office Visit: 8/13/18

## 2018-09-04 RX ORDER — LOSARTAN POTASSIUM 100 MG/1
TABLET ORAL
Qty: 30 TABLET | Refills: 0 | Status: SHIPPED | OUTPATIENT
Start: 2018-09-04 | End: 2018-10-07

## 2018-09-04 NOTE — TELEPHONE ENCOUNTER
Patient of .Please note BP's attached.See below.Pended.Thank you.    Vital Signs 6/13/2018 6/13/2018 6/18/2018 6/18/2018 6/25/2018   Systolic 193 193  204    Diastolic 102 102  94    Pulse  107  86      Vital Signs 6/25/2018 8/13/2018 8/13/2018 8/13/2018   Systolic 184  190 168   Diastolic 94  94 86   Pulse 90  76

## 2018-09-07 DIAGNOSIS — E78.2 MIXED HYPERLIPIDEMIA: ICD-10-CM

## 2018-09-07 RX ORDER — ATORVASTATIN CALCIUM 10 MG/1
TABLET, FILM COATED ORAL
Qty: 30 TABLET | Refills: 8 | Status: SHIPPED | OUTPATIENT
Start: 2018-09-07 | End: 2019-03-28

## 2018-09-07 NOTE — TELEPHONE ENCOUNTER
Lipitor  Last Written Prescription Date: 8/1/18  Last Fill Quantity: 30 # of Refills: 0  Last Office Visit: 8/13/18

## 2018-10-07 DIAGNOSIS — I10 BENIGN ESSENTIAL HYPERTENSION: ICD-10-CM

## 2018-10-08 NOTE — TELEPHONE ENCOUNTER
LOSARTAN 100MG TABLET       Last Written Prescription Date:  9/4/18  Last Fill Quantity: 30,   # refills: 0  Last Office Visit: 8/13/18  Future Office visit:

## 2018-10-09 RX ORDER — LOSARTAN POTASSIUM 100 MG/1
TABLET ORAL
Qty: 30 TABLET | Refills: 0 | Status: SHIPPED | OUTPATIENT
Start: 2018-10-09 | End: 2018-11-06

## 2018-11-06 DIAGNOSIS — I10 BENIGN ESSENTIAL HYPERTENSION: ICD-10-CM

## 2018-11-06 DIAGNOSIS — E11.9 CONTROLLED TYPE 2 DIABETES MELLITUS WITHOUT COMPLICATION, WITHOUT LONG-TERM CURRENT USE OF INSULIN (H): Primary | ICD-10-CM

## 2018-11-06 RX ORDER — LOSARTAN POTASSIUM 100 MG/1
TABLET ORAL
Qty: 30 TABLET | Refills: 5 | Status: SHIPPED | OUTPATIENT
Start: 2018-11-06 | End: 2019-04-28

## 2018-11-29 DIAGNOSIS — E03.8 OTHER SPECIFIED HYPOTHYROIDISM: ICD-10-CM

## 2018-11-30 RX ORDER — LEVOTHYROXINE SODIUM 25 UG/1
TABLET ORAL
Qty: 90 TABLET | Refills: 1 | Status: SHIPPED | OUTPATIENT
Start: 2018-11-30 | End: 2019-05-28

## 2018-12-30 DIAGNOSIS — E11.9 TYPE 2 DIABETES MELLITUS WITHOUT COMPLICATION, WITHOUT LONG-TERM CURRENT USE OF INSULIN (H): ICD-10-CM

## 2018-12-31 NOTE — TELEPHONE ENCOUNTER
METFORMIN 500MG TAB      Last Written Prescription Date:  5/4/18  Last Fill Quantity: 60,   # refills: 2  Last Office Visit: 8/13/18  Future Office visit:

## 2019-01-01 NOTE — PROGRESS NOTES
November 12, 2019     Patient: Ana Ray   YOB: 2017   Date of Visit: 2019     Ana is accompanied by: Mother    Healthy Steps Present During Visit    Discussed: Age Appropriate Development, Breastfeeding Support and Post Partum follow-up      Arelis Red RN    02/10/17 1200   General Information   Type of Visit Initial OP Ortho PT Evaluation   Start of Care Date 02/10/17   Referring Physician Dr. Jennie Rubio   Patient/Family Goals Statement Relieve pain   Orders Evaluate and Treat   Date of Order 02/03/17   Insurance Type Medicare;MA   Medical Diagnosis Right Low back pain with sciatica   Surgical/Medical history reviewed Yes   Precautions/Limitations no known precautions/limitations   Body Part(s)   Body Part(s) Lumbar Spine/SI   Presentation and Etiology   Pertinent history of current problem (include personal factors and/or comorbidities that impact the POC) Patient presents with low back pain primarily on her right side with intermittent radicular symptosm. Is unsure how injury occured but believes it was from shoveling about 3 months ago and progressively has just gotten worse. She went to a chiropractor for about 2 months with initial relief but nothing long lasting. Also got a cortizone injection with no improvement. Tried massage therapy with no benefits. Radicular symptoms will at times go down to the right mid calf. It comes instantly with certain movements and typically goes away relatively quickly. Has difficluty with squatting, standing, and walking longer distances. Patient is not taking any pills for the pain. Has tried warm oil to help with minial relief. 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 She found the spotting again last Sunday and is seeing her physician again on the 21st for this issue. No other new health changes in the past 6 months. Sleeps approximately 6 hours per night. Was diagnosed with diabetes 1 year ago but feels she has it well controlled.    Impairments A. Pain;E. Decreased flexibility;D. Decreased ROM;F. Decreased strength and endurance   Functional Limitations perform activities of daily living   Symptom Location Low back primarily right side that can radiate down right  leg   How/Where did it occur From insidious onset   Onset date of current episode/exacerbation 02/03/17   Chronicity New   Pain rating (0-10 point scale) Best (/10);Worst (/10)   Best (/10) 1-2   Worst (/10) 10   Pain quality A. Sharp   Frequency of pain/symptoms B. Intermittent   Pain/symptoms are: Other   Pain symptoms comment Comes with certain movements or with prolonged activity   Pain/symptoms exacerbated by M. Other   Pain exacerbation comment certain movements   Pain/symptoms eased by C. Rest   Progression of symptoms since onset: Worsened   Current / Previous Interventions   Diagnostic Tests: X-ray   X-ray Results Results   X-ray results See Radiology report   Prior Level of Function   Prior Level of Function-Mobility Independent   Prior Level of Function-ADLs Independent   Current Level of Function   Patient role/employment history F. Retired   Living environment [place]   Fall Risk Screen   Fall screen completed by PT   Per patient - Fall 2 or more times in past year? No   Per patient - Fall with injury in past year? No   Is patient a fall risk? No   System Outcome Measures   Outcome Measures Low Back Pain (see Oswestry and Susy)   Lumbar Spine/SI Objective Findings   Observation Unremarkable findings on observations   Integumentary Unremarkable   Gait/Locomotion Slower gait speed, slight limp with right LE   Flexion ROM WNL   Extension ROM Min limited with pain at end   Right Side Bending ROM WNL   Left Side Bending ROM WNL   Lumbar ROM Comment Extension only motion limited with pain at end range. Rotation and side bending has slight pull and stretch feeling in right side of back.    Hip Screen No significant findings   Hip Flexion (L2) Strength 4,5   Hip Abduction Strength 5   Hip Adduction Strength 5   Knee Flexion Strength 5   Knee Extension (L3) Strength 5   Ankle Dorsiflexion (L4) Strength 5   Lumbar/Hip/Knee/Foot Strength Comments Patient had 4/5 strength on right hip flexion. All other  motions 5/5   Lumbar/SI Special Tests Comments Minimal stiffness through lumbar vertebrae with spring testing   Neurological Testing Comments Intact   Palpation Mild guarding and tensing up with palpation to the L4-L5 musculature.    Planned Therapy Interventions   Planned Therapy Interventions manual therapy;joint mobilization;ROM;strengthening;stretching   Planned Modality Interventions   Planned Modality Interventions Electrical stimulation;Ultrasound;Traction   Clinical Impression   Criteria for Skilled Therapeutic Interventions Met yes, treatment indicated   PT Diagnosis Pain and stiffness in low back with radicular symptoms in right leg   Influenced by the following impairments pain, stiffness, weakness   Functional limitations due to impairments Difficulty with standing and walking longer distances and periods of time, difficulty lifting something up from the floor   Clinical Presentation Evolving/Changing   Clinical Presentation Rationale Due to radicular symptoms, other known comorbidities, and the spotting of blood with past history of tumor   Clinical Decision Making (Complexity) Moderate complexity   Therapy Frequency 2 times/Week   Predicted Duration of Therapy Intervention (days/wks) 6 weeks   Risk & Benefits of therapy have been explained Yes   Patient, Family & other staff in agreement with plan of care Yes   Clinical Impression Comments Presentation consistend with low back pain with mild radicular symptoms into right LE.    ORTHO GOALS   PT Ortho Eval Goals 1;2;3   Ortho Goal 1   Goal Identifier STG 1   Goal Description Patient will display independence and compliance with her HEP in order to return to her prior level of function and decrease her pain   Target Date 02/24/17   Ortho Goal 2   Goal Identifier LTG 1   Goal Description Patient will be able to stand for longer than 1 hour without pain greater than 2/10 in order to improve her efficiency and functional mobility with ADL's and other tasks  at home.    Target Date 03/24/17   Ortho Goal 3   Goal Identifier LTG 2   Goal Description Patient will be able to walk for 10 minutes at a time without requiring a rest break in order to improve her functional mobility and safety in the home and community   Target Date 03/24/17   Total Evaluation Time   Total Evaluation Time 40   Therapy Certification   Certification date from 02/10/17   Certification date to 03/24/17   Medical Diagnosis Acute right sided low back pain with right sciatica

## 2019-02-11 DIAGNOSIS — E11.9 TYPE 2 DIABETES MELLITUS WITHOUT COMPLICATION, WITHOUT LONG-TERM CURRENT USE OF INSULIN (H): ICD-10-CM

## 2019-02-11 NOTE — TELEPHONE ENCOUNTER
ONETOUCH DELICA LANCETS 33G MISC  Last Written Prescription Date:  8/30/17  Last Fill Quantity: 3,   # refills: 11  Last Office Visit: 8/13/18  Future Office visit:

## 2019-02-12 RX ORDER — LANCETS 33 GAUGE
EACH MISCELLANEOUS
Qty: 300 EACH | Refills: 0 | Status: SHIPPED | OUTPATIENT
Start: 2019-02-12

## 2019-03-07 DIAGNOSIS — M10.00 IDIOPATHIC GOUT, UNSPECIFIED CHRONICITY, UNSPECIFIED SITE: ICD-10-CM

## 2019-03-07 NOTE — TELEPHONE ENCOUNTER
Allopurinol (Zyloprim) 100 mg tab      Last Written Prescription Date:  8-9-18  Last Fill Quantity: 135,   # refills: 0  Last Office Visit: 8-13-18

## 2019-03-11 RX ORDER — ALLOPURINOL 100 MG/1
TABLET ORAL
Qty: 135 TABLET | Refills: 0 | Status: SHIPPED | OUTPATIENT
Start: 2019-03-11 | End: 2019-07-01

## 2019-03-28 ENCOUNTER — OFFICE VISIT (OUTPATIENT)
Dept: OTOLARYNGOLOGY | Facility: OTHER | Age: 79
End: 2019-03-28
Attending: PHYSICIAN ASSISTANT
Payer: MEDICARE

## 2019-03-28 VITALS
BODY MASS INDEX: 33.38 KG/M2 | SYSTOLIC BLOOD PRESSURE: 142 MMHG | HEART RATE: 55 BPM | WEIGHT: 170 LBS | HEIGHT: 60 IN | TEMPERATURE: 98.1 F | DIASTOLIC BLOOD PRESSURE: 90 MMHG

## 2019-03-28 DIAGNOSIS — H61.22 IMPACTED CERUMEN OF LEFT EAR: Primary | ICD-10-CM

## 2019-03-28 DIAGNOSIS — H61.21 EXCESSIVE CERUMEN IN RIGHT EAR CANAL: ICD-10-CM

## 2019-03-28 PROCEDURE — 69210 REMOVE IMPACTED EAR WAX UNI: CPT | Performed by: PHYSICIAN ASSISTANT

## 2019-03-28 PROCEDURE — G0463 HOSPITAL OUTPT CLINIC VISIT: HCPCS

## 2019-03-28 PROCEDURE — 92504 EAR MICROSCOPY EXAMINATION: CPT | Performed by: PHYSICIAN ASSISTANT

## 2019-03-28 ASSESSMENT — PAIN SCALES - GENERAL: PAINLEVEL: NO PAIN (0)

## 2019-03-28 ASSESSMENT — MIFFLIN-ST. JEOR: SCORE: 1177.61

## 2019-03-28 NOTE — LETTER
3/28/2019         RE: Bel Reardon  [address]  [facility] [address]        Dear Colleague,    Thank you for referring your patient, Bel Reardon, to the [facility]. Please see a copy of my visit note below.    Otolaryngology Consultation    Patient: Bel Reardon  : 1940    Patient presents with:  Cerumen Impaction: Pt is here for an ear cleaning.      HPI:  Bel Reardon is a 79 year old female seen today for ear cleaning.   Patient has been seen in ENT in past for epistaxis. Her last visit was on 3/26/15. She had normal exam and no bleeding during that visit.   She returns today for concerns regarding ear issues. Bel went into her PCP for exam and they noted cerumen impaction.     She has no otalgia, otorrhea.   Denies COM or otologic surgeries  Denies tinnitus, hearing concerns.   Denies facial paraesthesia or dysphagia.       Current Outpatient Rx   Medication Sig Dispense Refill     allopurinol (ZYLOPRIM) 100 MG tablet TAKE ONE & ONE-HALF TABLETS (150MG) BY MOUTH DAILY 135 tablet 0     atorvastatin (LIPITOR) 10 MG tablet TAKE 1 TABLET BY MOUTH EVERY DAY 30 tablet 8     blood glucose monitoring (ONE TOUCH ULTRA MINI) meter device kit 1 kit by In Vitro route daily Use to test blood sugar 1 times daily or as directed. 1 kit 0     hydrOXYzine (VISTARIL) 25 MG capsule Take 1 capsule (25 mg) by mouth 3 times daily as needed for anxiety 30 capsule 1     levothyroxine (SYNTHROID/LEVOTHROID) 25 MCG tablet TAKE 1 TABLET BY MOUTH EVERY DAY 90 tablet 1     losartan (COZAAR) 100 MG tablet TAKE 1 TABLET BY MOUTH EVERY DAY 30 tablet 5     metFORMIN (GLUCOPHAGE) 500 MG tablet TAKE 1 TABLET BY MOUTH WITH BREAKFAST AND 1 TABLET WITH SUPPER 60 tablet 2     Omega-3 Fatty Acids (FISH OIL) 1000 MG CPDR Take 1 capsule by mouth daily       ONETOUCH DELICA LANCETS 33G MISC USE TO TEST BLOOD SUGAR 1 TIME DAILY OR AS DIRECTED. 300 each 0     ONETOUCH ULTRA test strip TEST BLOOD  SUGARS ONCE DAILY 100 each 3     order for DME Equipment being ordered: BP cuff  Automatic arm cuff 1 Device 0     valsartan (DIOVAN) 160 MG tablet Take 1 tablet (160 mg) by mouth 2 times daily 60 tablet 2       Allergies: Aspirin; Erythromycin base [kdc:yellow dye+erythromycin+brilliant blue fcf]; Ezetimibe; Fenofibrate micronized [fenofibrate]; Levofloxacin; Lisinopril; No clinical screening - see comments; Pravastatin sodium; Dexamethasone; Dexamethasone sod phosphate [dexamethasone sodium phosphate]; Niacin; and Rosuvastatin calcium     Past Medical History:   Diagnosis Date     Agoraphobia with panic disorder 03/24/2011     Anxiety 03/24/2011     DM2 (diabetes mellitus, type 2) (H)      Epistaxis      Fibrocystic Breast Disease 03/24/2011     GERD 03/24/2011    Hx H. Pylori     Gout, unspecified 01/04/2011     Hypertension, Benign 03/24/2011     Lichen sclerosus     declines medication     Normal cardiac stress test 02/28/2013 Feb 2013 wnl     Pure hypercholesterolemia 01/13/2009       Past Surgical History:   Procedure Laterality Date     COLONOSCOPY N/A 8/21/2015    no further scopes needed.  Surgeon: Yohan Licona DO;  Location: [facility]     DILATION AND CURETTAGE, OPERATIVE HYSTEROSCOPY, COMBINED N/A 1/13/2016    Procedure: COMBINED DILATION AND CURETTAGE, OPERATIVE HYSTEROSCOPY;  Surgeon: Seven Kern MD;  Location: [facility] OR     left shoulder  1990    nerve damage  s/p trauma; from repetitious work     lumpectomy left breast  1990    benign -- fibroadenoma-left; Facility; [facility]     PHACOEMULSIFICATION WITH STANDARD INTRAOCULAR LENS IMPLANT Left 11/11/2014    Procedure: PHACOEMULSIFICATION WITH STANDARD INTRAOCULAR LENS IMPLANT;  Surgeon: Bobby Arambula MD;  Location: [facility]     PHACOEMULSIFICATION WITH STANDARD INTRAOCULAR LENS IMPLANT Right 5/22/2018    Procedure: PHACOEMULSIFICATION WITH STANDARD INTRAOCULAR LENS IMPLANT;  PHACOEMULSIFICATION CATARACT EXTRACTION POSTERIOR CHAMBER LENS RIGHT;   Surgeon: Bert Haddad MD;  Location: [facility] OR       ENT family history reviewed    Social History     Tobacco Use     Smoking status: Former Smoker     Years: 14.00     Types: Cigarettes     Smokeless tobacco: Never Used     Tobacco comment: 20.00 per day; quit 1991   Substance Use Topics     Alcohol use: Yes     Alcohol/week: 0.0 oz     Comment: 1 beer weekly     Drug use: No       Review of Systems  ROS: 10 point ROS neg other than the symptoms noted above in the HPI     /90 (BP Location: Right arm, Cuff Size: Adult Large)   Pulse 55   Temp 98.1  F (36.7  C) (Tympanic)   Ht 1.524 m (5')   Wt 77.1 kg (170 lb)   BMI 33.20 kg/m     General - The patient is well nourished and well developed, and appears to have good nutritional status.  Alert and oriented to person and place, answers questions and cooperates with examination appropriately.   Head and Face - Normocephalic and atraumatic, with no gross asymmetry noted.  The facial nerve is intact, with strong symmetric movements.  Voice and Breathing - The patient was breathing comfortably without the use of accessory muscles. There was no wheezing, stridor, or stertor.  The patients voice was clear and strong, and had appropriate pitch and quality.  Ears -  On examination of the ears, I found that the ear(s) were completely impacted with dense cerumen.  Therefore, I positioned them in the examination chair in a semi-supine position, beginning with the right side.  I used the binocular surgical microscope to perform cerumen removal.  I began by using a cerumen loop to gently lift the edges of the cerumen mass away from the walls of the external canal.  Once I did this, I was able to suction away fragments of wax and debris using suction.  Once the mass was loose enough, the entire plug was pulled from the canal.  The tympanic membrane was intact, no sign of perforation or middle ear effusion.    I turned my attention to the contralateral side once again  using the binocular surgical microscope to perform cerumen removal.  I began by using a cerumen loop to gently lift the edges of the cerumen mass away from the walls of the external canal.  Once I did this, I was able to remove cerumen with forceps.  Once the mass was loose enough, the entire plug was pulled from the canal.  The tympanic membrane was intact, no sign of perforation or middle ear effusion.    Eyes - Extraocular movements intact, and the pupils were reactive to light.  Sclera were not icteric or injected, conjunctiva were pink and moist.  Mouth - Examination of the oral cavity showed pink, healthy oral mucosa. No lesions or ulcerations noted.  The tongue was mobile and midline, and the dentition were in good condition.    Throat - The walls of the oropharynx were smooth, pink, moist, symmetric, and had no lesions or ulcerations.  The tonsillar pillars and soft palate were symmetric.  The uvula was midline on elevation.    Neck - Normal midline excursion of the laryngotracheal complex during swallowing.  Full range of motion on passive movement.  Palpation of the occipital, submental, submandibular, internal jugular chain, and supraclavicular nodes did not demonstrate any abnormal lymph nodes or masses.  Palpation of the thyroid was soft and smooth, with no nodules or goiter appreciated.  The trachea was mobile and midline.  Nose - External contour is symmetric, no gross deflection or scars.  Nasal mucosa is pink and moist with no abnormal mucus.  The septum and turbinates were evaluated:  No polyps, masses, or purulence noted on examination.      ASSESSMENT:    ICD-10-CM    1. Impacted cerumen of left ear H61.22    2. Excessive cerumen in right ear canal H61.21      Ears were cleaned.   Normal ear exam.   Return PRN for ear cleaning.     The ears were cleaned today.  The patient may return here as needed or with their primary physician.  Aural hygiene was discussed and brochure was given to the patient  highlighting care of the ear canal.     If there are any unresolved concerns regarding hearing loss an audiogram is recommended.     Xiao Caba PA-C  ENT  [facility], [place]  [phone]      Again, thank you for allowing me to participate in the care of your patient.        Sincerely,        Xiao Caba PA-C

## 2019-03-28 NOTE — PATIENT INSTRUCTIONS
Ears were cleaned.   Normal ear exam.   No fluid, infection.     Return as needed for ear cleaning.     Thank you for allowing SATHYA Carver and our ENT team to participate in your care.  If your medications are too expensive, please give the nurse a call.  We can possibly change this medication.  If you have a scheduling or an appointment question please contact our Health Unit Coordinator at their direct line [phone].   ALL nursing questions or concerns can be directed to your ENT nurse at: [phone] Thelma

## 2019-03-28 NOTE — NURSING NOTE
Chief Complaint   Patient presents with     Cerumen Impaction     Pt is here for an ear cleaning.       Initial /90 (BP Location: Right arm, Cuff Size: Adult Large)   Pulse 55   Temp 98.1  F (36.7  C) (Tympanic)   Ht 1.524 m (5')   Wt 77.1 kg (170 lb)   BMI 33.20 kg/m   Estimated body mass index is 33.2 kg/m  as calculated from the following:    Height as of this encounter: 1.524 m (5').    Weight as of this encounter: 77.1 kg (170 lb).  Medication Reconciliation: complete    Thelma Herr LPN

## 2019-03-28 NOTE — PROGRESS NOTES
Otolaryngology Consultation    Patient: Bel Reardon  : 1940    Patient presents with:  Cerumen Impaction: Pt is here for an ear cleaning.      HPI:  Bel Reardon is a 79 year old female seen today for ear cleaning.   Patient has been seen in ENT in past for epistaxis. Her last visit was on 3/26/15. She had normal exam and no bleeding during that visit.   She returns today for concerns regarding ear issues. Bel went into her PCP for exam and they noted cerumen impaction.     She has no otalgia, otorrhea.   Denies COM or otologic surgeries  Denies tinnitus, hearing concerns.   Denies facial paraesthesia or dysphagia.       Current Outpatient Rx   Medication Sig Dispense Refill     allopurinol (ZYLOPRIM) 100 MG tablet TAKE ONE & ONE-HALF TABLETS (150MG) BY MOUTH DAILY 135 tablet 0     atorvastatin (LIPITOR) 10 MG tablet TAKE 1 TABLET BY MOUTH EVERY DAY 30 tablet 8     blood glucose monitoring (ONE TOUCH ULTRA MINI) meter device kit 1 kit by In Vitro route daily Use to test blood sugar 1 times daily or as directed. 1 kit 0     hydrOXYzine (VISTARIL) 25 MG capsule Take 1 capsule (25 mg) by mouth 3 times daily as needed for anxiety 30 capsule 1     levothyroxine (SYNTHROID/LEVOTHROID) 25 MCG tablet TAKE 1 TABLET BY MOUTH EVERY DAY 90 tablet 1     losartan (COZAAR) 100 MG tablet TAKE 1 TABLET BY MOUTH EVERY DAY 30 tablet 5     metFORMIN (GLUCOPHAGE) 500 MG tablet TAKE 1 TABLET BY MOUTH WITH BREAKFAST AND 1 TABLET WITH SUPPER 60 tablet 2     Omega-3 Fatty Acids (FISH OIL) 1000 MG CPDR Take 1 capsule by mouth daily       ONETOUCH DELICA LANCETS 33G MISC USE TO TEST BLOOD SUGAR 1 TIME DAILY OR AS DIRECTED. 300 each 0     ONETOUCH ULTRA test strip TEST BLOOD SUGARS ONCE DAILY 100 each 3     order for DME Equipment being ordered: BP cuff  Automatic arm cuff 1 Device 0     valsartan (DIOVAN) 160 MG tablet Take 1 tablet (160 mg) by mouth 2 times daily 60 tablet 2       Allergies: Aspirin; Erythromycin base  [kdc:yellow dye+erythromycin+brilliant blue fcf]; Ezetimibe; Fenofibrate micronized [fenofibrate]; Levofloxacin; Lisinopril; No clinical screening - see comments; Pravastatin sodium; Dexamethasone; Dexamethasone sod phosphate [dexamethasone sodium phosphate]; Niacin; and Rosuvastatin calcium     Past Medical History:   Diagnosis Date     Agoraphobia with panic disorder 03/24/2011     Anxiety 03/24/2011     DM2 (diabetes mellitus, type 2) (H)      Epistaxis      Fibrocystic Breast Disease 03/24/2011     GERD 03/24/2011    Hx H. Pylori     Gout, unspecified 01/04/2011     Hypertension, Benign 03/24/2011     Lichen sclerosus     declines medication     Normal cardiac stress test 02/28/2013 Feb 2013 wnl     Pure hypercholesterolemia 01/13/2009       Past Surgical History:   Procedure Laterality Date     COLONOSCOPY N/A 8/21/2015    no further scopes needed.  Surgeon: Yohan Licona DO;  Location: [facility]     DILATION AND CURETTAGE, OPERATIVE HYSTEROSCOPY, COMBINED N/A 1/13/2016    Procedure: COMBINED DILATION AND CURETTAGE, OPERATIVE HYSTEROSCOPY;  Surgeon: Seven Kern MD;  Location: [facility]     left shoulder  1990    nerve damage  s/p trauma; from repetitious work     lumpectomy left breast  1990    benign -- fibroadenoma-left; Facility; [facility]     PHACOEMULSIFICATION WITH STANDARD INTRAOCULAR LENS IMPLANT Left 11/11/2014    Procedure: PHACOEMULSIFICATION WITH STANDARD INTRAOCULAR LENS IMPLANT;  Surgeon: Bobby Arambula MD;  Location: [facility]     PHACOEMULSIFICATION WITH STANDARD INTRAOCULAR LENS IMPLANT Right 5/22/2018    Procedure: PHACOEMULSIFICATION WITH STANDARD INTRAOCULAR LENS IMPLANT;  PHACOEMULSIFICATION CATARACT EXTRACTION POSTERIOR CHAMBER LENS RIGHT;  Surgeon: Bert Haddad MD;  Location: [facility] OR       ENT family history reviewed    Social History     Tobacco Use     Smoking status: Former Smoker     Years: 14.00     Types: Cigarettes     Smokeless tobacco: Never Used     Tobacco comment: 20.00 per  day; quit 1991   Substance Use Topics     Alcohol use: Yes     Alcohol/week: 0.0 oz     Comment: 1 beer weekly     Drug use: No       Review of Systems  ROS: 10 point ROS neg other than the symptoms noted above in the HPI     /90 (BP Location: Right arm, Cuff Size: Adult Large)   Pulse 55   Temp 98.1  F (36.7  C) (Tympanic)   Ht 1.524 m (5')   Wt 77.1 kg (170 lb)   BMI 33.20 kg/m    General - The patient is well nourished and well developed, and appears to have good nutritional status.  Alert and oriented to person and place, answers questions and cooperates with examination appropriately.   Head and Face - Normocephalic and atraumatic, with no gross asymmetry noted.  The facial nerve is intact, with strong symmetric movements.  Voice and Breathing - The patient was breathing comfortably without the use of accessory muscles. There was no wheezing, stridor, or stertor.  The patients voice was clear and strong, and had appropriate pitch and quality.  Ears -  On examination of the ears, I found that the ear(s) were completely impacted with dense cerumen.  Therefore, I positioned them in the examination chair in a semi-supine position, beginning with the right side.  I used the binocular surgical microscope to perform cerumen removal.  I began by using a cerumen loop to gently lift the edges of the cerumen mass away from the walls of the external canal.  Once I did this, I was able to suction away fragments of wax and debris using suction.  Once the mass was loose enough, the entire plug was pulled from the canal.  The tympanic membrane was intact, no sign of perforation or middle ear effusion.    I turned my attention to the contralateral side once again using the binocular surgical microscope to perform cerumen removal.  I began by using a cerumen loop to gently lift the edges of the cerumen mass away from the walls of the external canal.  Once I did this, I was able to remove cerumen with forceps.  Once  the mass was loose enough, the entire plug was pulled from the canal.  The tympanic membrane was intact, no sign of perforation or middle ear effusion.    Eyes - Extraocular movements intact, and the pupils were reactive to light.  Sclera were not icteric or injected, conjunctiva were pink and moist.  Mouth - Examination of the oral cavity showed pink, healthy oral mucosa. No lesions or ulcerations noted.  The tongue was mobile and midline, and the dentition were in good condition.    Throat - The walls of the oropharynx were smooth, pink, moist, symmetric, and had no lesions or ulcerations.  The tonsillar pillars and soft palate were symmetric.  The uvula was midline on elevation.    Neck - Normal midline excursion of the laryngotracheal complex during swallowing.  Full range of motion on passive movement.  Palpation of the occipital, submental, submandibular, internal jugular chain, and supraclavicular nodes did not demonstrate any abnormal lymph nodes or masses.  Palpation of the thyroid was soft and smooth, with no nodules or goiter appreciated.  The trachea was mobile and midline.  Nose - External contour is symmetric, no gross deflection or scars.  Nasal mucosa is pink and moist with no abnormal mucus.  The septum and turbinates were evaluated:  No polyps, masses, or purulence noted on examination.      ASSESSMENT:    ICD-10-CM    1. Impacted cerumen of left ear H61.22    2. Excessive cerumen in right ear canal H61.21      Ears were cleaned.   Normal ear exam.   Return PRN for ear cleaning.     The ears were cleaned today.  The patient may return here as needed or with their primary physician.  Aural hygiene was discussed and brochure was given to the patient highlighting care of the ear canal.     If there are any unresolved concerns regarding hearing loss an audiogram is recommended.     Xiao Caba PA-C  ENT  [facility], [place]  [phone]

## 2019-04-28 DIAGNOSIS — I10 BENIGN ESSENTIAL HYPERTENSION: ICD-10-CM

## 2019-04-28 DIAGNOSIS — E11.9 TYPE 2 DIABETES MELLITUS WITHOUT COMPLICATION, WITHOUT LONG-TERM CURRENT USE OF INSULIN (H): ICD-10-CM

## 2019-04-28 NOTE — LETTER
April 29, 2019      Bel Reardon  [address]   [place] [address]        Dear Bel,         We are concerned about your health care.  We recently provided you with medication refills.  Lab tests are required every year in order to continue refilling your Metformin.  Orders have been placed in our computer and should be accessible at most [facility] labs. You WILL NOT need to be fasting for this lab. Please complete this as soon as possible. If you have any questions please call us at [phone].      Sincerely,  Jennie Rubio MD

## 2019-04-29 RX ORDER — LOSARTAN POTASSIUM 100 MG/1
TABLET ORAL
Qty: 30 TABLET | Refills: 3 | Status: SHIPPED | OUTPATIENT
Start: 2019-04-29 | End: 2019-08-26

## 2019-04-29 NOTE — TELEPHONE ENCOUNTER
losartan      Last Written Prescription Date:  11/6/18  Last Fill Quantity: 30,   # refills: 5  Last Office Visit: 8/13/18

## 2019-04-29 NOTE — TELEPHONE ENCOUNTER
metformin      Last Written Prescription Date:  12/31/18  Last Fill Quantity: 60,   # refills: 2  Last Office Visit: 8/13/18

## 2019-05-28 DIAGNOSIS — E03.8 OTHER SPECIFIED HYPOTHYROIDISM: ICD-10-CM

## 2019-05-29 RX ORDER — LEVOTHYROXINE SODIUM 25 UG/1
TABLET ORAL
Qty: 90 TABLET | Refills: 0 | Status: SHIPPED | OUTPATIENT
Start: 2019-05-29 | End: 2019-08-26

## 2019-05-29 NOTE — TELEPHONE ENCOUNTER
levothyroxine (SYNTHROID/LEVOTHROID) 25 MCG tablet    Last Written Prescription Date:  11/30/2018  Last Fill Quantity: 90,   # refills: 1  Last Office Visit: 08/13/2018  Future Office visit:       Routing refill request to provider for review/approval because:

## 2019-06-06 DIAGNOSIS — E11.9 TYPE 2 DIABETES MELLITUS WITHOUT COMPLICATION, WITHOUT LONG-TERM CURRENT USE OF INSULIN (H): ICD-10-CM

## 2019-06-06 NOTE — LETTER
June 7, 2019      Bel Reardon  [address]   [place] [address]        Dear Bel,     APPOINTMENT REMINDER:       Our record indicates that it is time for you to be seen for a diabetes follow up with labs.    You may call our office at [phone] to schedule an appointment.    Please disregard this notice if you have already made an appointment.        Sincerely,    Jennie Rubio MD

## 2019-06-07 NOTE — TELEPHONE ENCOUNTER
metFORMIN (GLUCOPHAGE) 500 MG tablet      Last Written Prescription Date:  4/29/19  Last Fill Quantity: 60,   # refills: 0  Last Office Visit: 8/13/18  Future Office visit:       Routing refill request to provider for review/approval because:   Blood pressure less than 140/90 in past 6 months    Patient has had a Microalbumin in the past 15 mos.    Patient has documented A1c within the specified period of time.    Recent (6 mo) or future (30 days) visit within the authorizing provider's specialty     Labs ordered. Letter sent. Please advise. Thank you!

## 2019-07-01 DIAGNOSIS — M10.00 IDIOPATHIC GOUT, UNSPECIFIED CHRONICITY, UNSPECIFIED SITE: ICD-10-CM

## 2019-07-02 RX ORDER — ALLOPURINOL 100 MG/1
TABLET ORAL
Qty: 135 TABLET | Refills: 0 | Status: SHIPPED | OUTPATIENT
Start: 2019-07-02

## 2019-07-02 NOTE — TELEPHONE ENCOUNTER
allopurinol (ZYLOPRIM) 100 MG tablet     Last Written Prescription Date:  03/11/2019  Last Fill Quantity: 135,   # refills: 0  Last Office Visit: 08/13/2018  Future Office visit:       Routing refill request to provider for review/approval because:

## 2019-07-02 NOTE — TELEPHONE ENCOUNTER
Labs are ordered and letter has been sent. Patient over due for labs, and diabetic appointment.    Uric Acid   Date Value Ref Range Status   04/21/2013 6.5 (H) 2.6 - 6.0 mg/dL Final               3.8 08/13/2018     Lab Results   Component Value Date    WBC 4.9 06/13/2018     Lab Results   Component Value Date    RBC 4.97 06/13/2018     Lab Results   Component Value Date    HGB 15.5 06/13/2018     Lab Results   Component Value Date    HCT 43.2 06/13/2018     No components found for: MCT  Lab Results   Component Value Date    MCV 87 06/13/2018     Lab Results   Component Value Date    MCH 31.2 06/13/2018     Lab Results   Component Value Date    MCHC 35.9 06/13/2018     Lab Results   Component Value Date    RDW 13.3 06/13/2018     Lab Results   Component Value Date     06/13/2018     allopurinol (ZYLOPRIM) 100 MG tablet      Last Written Prescription Date:  3/11/19  Last Fill Quantity: 135,   # refills: 0  Last Office Visit: 8/13/1/8  Future Office visit:       Routing refill request to provider for review/approval because:  Drug not on the [facility], [facility] or [facility] refill protocol or controlled substance

## 2019-08-12 DIAGNOSIS — E11.9 TYPE 2 DIABETES MELLITUS WITHOUT COMPLICATION, WITHOUT LONG-TERM CURRENT USE OF INSULIN (H): ICD-10-CM

## 2019-08-12 NOTE — TELEPHONE ENCOUNTER
Metformin  Last Written Prescription Date: 6/7/19  Last Fill Quantity: 60 # of Refills: 0  Last Office Visit: 8/13/18

## 2019-08-26 DIAGNOSIS — E03.8 OTHER SPECIFIED HYPOTHYROIDISM: ICD-10-CM

## 2019-08-26 DIAGNOSIS — I10 BENIGN ESSENTIAL HYPERTENSION: ICD-10-CM

## 2019-08-27 RX ORDER — LEVOTHYROXINE SODIUM 25 UG/1
25 TABLET ORAL DAILY
Qty: 30 TABLET | Refills: 0 | Status: SHIPPED | OUTPATIENT
Start: 2019-08-27

## 2019-08-27 RX ORDER — LOSARTAN POTASSIUM 100 MG/1
TABLET ORAL
Qty: 30 TABLET | Refills: 0 | Status: SHIPPED | OUTPATIENT
Start: 2019-08-27

## 2019-09-05 DIAGNOSIS — E11.9 TYPE 2 DIABETES MELLITUS WITHOUT COMPLICATION, WITHOUT LONG-TERM CURRENT USE OF INSULIN (H): ICD-10-CM

## 2019-09-09 NOTE — TELEPHONE ENCOUNTER
Metformin 500mg     Last Written Prescription Date:  8/13/2019  Last Fill Quantity: 60,   # refills: 0  Last Office Visit: 8/13/2018  Future Office visit:       Routing refill request to provider for review/approval because:  Failed protocol. Patient is due for labs and diabetes appointment. Patient has not been seen in 1 year. Patient also does not have a PCP. Please advise.

## 2019-09-09 NOTE — TELEPHONE ENCOUNTER
Ok to fill for 10 days worth but needs appt with me  I think she switched across the street for awhile

## 2020-01-06 DIAGNOSIS — E03.8 OTHER SPECIFIED HYPOTHYROIDISM: ICD-10-CM

## 2020-01-06 DIAGNOSIS — M10.00 IDIOPATHIC GOUT, UNSPECIFIED CHRONICITY, UNSPECIFIED SITE: ICD-10-CM

## 2020-01-06 NOTE — TELEPHONE ENCOUNTER
Allopurinol  Last Written Prescription Date: 7/2/19  Last Fill Quantity: 135 # of Refills: 0  Last Office Visit: 8/13/18    Levothyroxine  Last Written Prescription Date: 8/27/19  Last Fill Quantity: 30 # of Refills: 0  Last Office Visit: 8/13/19

## 2020-01-07 RX ORDER — LEVOTHYROXINE SODIUM 25 UG/1
25 TABLET ORAL DAILY
Qty: 30 TABLET | Refills: 0 | OUTPATIENT
Start: 2020-01-07

## 2020-01-07 RX ORDER — ALLOPURINOL 100 MG/1
TABLET ORAL
Qty: 135 TABLET | Refills: 0 | OUTPATIENT
Start: 2020-01-07

## 2020-01-10 ENCOUNTER — TELEPHONE (OUTPATIENT)
Dept: FAMILY MEDICINE | Facility: OTHER | Age: 80
End: 2020-01-10

## 2020-01-10 DIAGNOSIS — E03.8 OTHER SPECIFIED HYPOTHYROIDISM: ICD-10-CM

## 2020-01-10 RX ORDER — LEVOTHYROXINE SODIUM 25 UG/1
25 TABLET ORAL DAILY
Qty: 30 TABLET | Refills: 0 | Status: CANCELLED | OUTPATIENT
Start: 2020-01-10

## 2020-01-10 NOTE — TELEPHONE ENCOUNTER
Spoke with patient in regards to a refill request we received for her Levothyroxine 25 mcg. Patient now goes to the [facility] in [place] and gets her medications through the [facility] pharmacy in [place]. [facility] [place] . I will contact  the pharmacy in [place] to update them.

## 2020-01-15 ENCOUNTER — TRANSFERRED RECORDS (OUTPATIENT)
Dept: HEALTH INFORMATION MANAGEMENT | Facility: CLINIC | Age: 80
End: 2020-01-15

## 2020-01-15 DIAGNOSIS — E03.8 OTHER SPECIFIED HYPOTHYROIDISM: ICD-10-CM

## 2020-01-16 RX ORDER — LEVOTHYROXINE SODIUM 25 UG/1
25 TABLET ORAL DAILY
Qty: 30 TABLET | Refills: 0 | OUTPATIENT
Start: 2020-01-16

## 2020-01-22 ENCOUNTER — TRANSFERRED RECORDS (OUTPATIENT)
Dept: HEALTH INFORMATION MANAGEMENT | Facility: CLINIC | Age: 80
End: 2020-01-22

## 2020-02-18 ENCOUNTER — TRANSFERRED RECORDS (OUTPATIENT)
Dept: HEALTH INFORMATION MANAGEMENT | Facility: CLINIC | Age: 80
End: 2020-02-18

## 2020-03-17 ENCOUNTER — TRANSFERRED RECORDS (OUTPATIENT)
Dept: HEALTH INFORMATION MANAGEMENT | Facility: CLINIC | Age: 80
End: 2020-03-17

## 2020-04-28 ENCOUNTER — TRANSFERRED RECORDS (OUTPATIENT)
Dept: HEALTH INFORMATION MANAGEMENT | Facility: CLINIC | Age: 80
End: 2020-04-28

## 2020-06-09 ENCOUNTER — TRANSFERRED RECORDS (OUTPATIENT)
Dept: HEALTH INFORMATION MANAGEMENT | Facility: CLINIC | Age: 80
End: 2020-06-09

## 2022-06-21 NOTE — TELEPHONE ENCOUNTER
Levothyroxine      Last Written Prescription Date:  12/8/17/17  Last Fill Quantity: 90,   # refills: 3  Last Office Visit: 8/13/18       Previous Accession (Optional): [id number] Previous Accession (Optional): [id number]

## 2023-06-15 NOTE — PROGRESS NOTES
Chief Complaint   Patient presents with     Cerumen Impaction     Ear cleaning        Bel Reardon is a 83 year old female seen today for ear cleaning.   Patient is last seen in ENT in 2019 for left cerumen impaction.  Patient states that her ears are feeling full muffled.  She has no complaints of otalgia or otorrhea.  She has felt that her hearing has been slightly decreased but does not necessarily feel concern for hearing loss.     She has no otalgia, otorrhea.   Denies COM or otologic surgeries  Denies tinnitus, hearing concerns.   Denies facial paraesthesia or dysphagia.        Past Medical History:   Diagnosis Date     Agoraphobia with panic disorder 03/24/2011     Anxiety 03/24/2011     DM2 (diabetes mellitus, type 2) (H)      Epistaxis      Fibrocystic Breast Disease 03/24/2011     GERD 03/24/2011    Hx H. Pylori     Gout, unspecified 01/04/2011     Hypertension, Benign 03/24/2011     Lichen sclerosus     declines medication     Normal cardiac stress test 02/28/2013 Feb 2013 wnl     Pure hypercholesterolemia 01/13/2009       Allergies   Allergen Reactions     Aspirin      Tightness in chest       Erythromycin Base [Erythromycin Base]      Ezetimibe Other (See Comments)     Aches; Zetia       Fenofibrate Micronized [Fenofibrate]      Aches; Tricor     Levofloxacin      Flu-like symptoms.     Lisinopril Other (See Comments)     headache     Other [No Clinical Screening - See Comments]      Fenofibrate nanocrystallized; Aches- Tricor     Pravastatin Sodium Other (See Comments)     Myalgia; Pravachol     Dexamethasone Rash     Maxidex     Dexamethasone Sod Phosphate [Dexamethasone Sodium Phosphate] Rash     Maxidex     Niacin Rash     Rosuvastatin Calcium Rash     crestor       Current Outpatient Medications   Medication     allopurinol (ZYLOPRIM) 100 MG tablet     blood glucose monitoring (ONE TOUCH ULTRA MINI) meter device kit     levothyroxine (SYNTHROID/LEVOTHROID) 25 MCG tablet     losartan (COZAAR)  100 MG tablet     metFORMIN (GLUCOPHAGE) 500 MG tablet     Omega-3 Fatty Acids (FISH OIL) 1000 MG CPDR     ONETOUCH DELICA LANCETS 33G MISC     ONETOUCH ULTRA test strip     order for DME     No current facility-administered medications for this visit.     ROS- SEE HPI    BP (!) 152/78 (BP Location: Right arm, Patient Position: Sitting, Cuff Size: Adult Regular)   Pulse 66   Temp 97.5  F (36.4  C) (Tympanic)   SpO2 98%     General - The patient is well nourished and well developed, and appears to have good nutritional status.  Alert and oriented to person and place, answers questions and cooperates with examination appropriately.   Head and Face - Normocephalic and atraumatic, with no gross asymmetry noted.  The facial nerve is intact, with strong symmetric movements.  Voice and Breathing - The patient was breathing comfortably without the use of accessory muscles. There was no wheezing, stridor, or stertor.  The patients voice was clear and strong, and had appropriate pitch and quality.  Ears -  On examination of the ears, I found that the ear(s) were completely impacted with dense cerumen.  Therefore, I positioned them in the examination chair in a semi-supine position, beginning with the right side.  I used the binocular surgical microscope to perform cerumen removal.  I began by using a cerumen loop to gently lift the edges of the cerumen mass away from the walls of the external canal.  Once the mass was loose enough, the entire plug was pulled from the canal   With cup forceps. The tympanic membrane was intact, no sign of perforation or middle ear effusion.     I turned my attention to the contralateral side once again using the binocular surgical microscope to perform cerumen removal.  I began by using a cerumen loop to gently lift the edges of the cerumen mass away from the walls of the external canal.  Once I did this, I was able to remove cerumen with forceps.  Once the mass was loose enough, the entire  plug was pulled from the canal.  The tympanic membrane was intact, no sign of perforation or middle ear effusion.     Eyes - Extraocular movements intact, and the pupils were reactive to light.  Sclera were not icteric or injected, conjunctiva were pink and moist.  Mouth - Examination of the oral cavity showed pink, healthy oral mucosa. No lesions or ulcerations noted.  The tongue was mobile and midline, and the dentition were in good condition.    Throat - The walls of the oropharynx were smooth, pink, moist, symmetric, and had no lesions or ulcerations.  The tonsillar pillars and soft palate were symmetric.  The uvula was midline on elevation.    Neck - Normal midline excursion of the laryngotracheal complex during swallowing.  Full range of motion on passive movement.  Palpation of the occipital, submental, submandibular, internal jugular chain, and supraclavicular nodes did not demonstrate any abnormal lymph nodes or masses.  Palpation of the thyroid was soft and smooth, with no nodules or goiter appreciated.  The trachea was mobile and midline.  Nose - External contour is symmetric, no gross deflection or scars.  Nasal mucosa is pink and moist with no abnormal mucus.  The septum and turbinates were evaluated:  No polyps, masses, or purulence noted on examination.        ASSESSMENT/ PLAN:    ICD-10-CM    1. Bilateral impacted cerumen  H61.23           Ears were cleaned.   Normal ear exam.   Return PRN for ear cleaning.  Briefly reviewed audiogram and recommended.  Patient states that she has no concerns and may consider in future.   The ears were cleaned today.  The patient may return here as needed or with their primary physician.  Aural hygiene was discussed and brochure was given to the patient highlighting care of the ear canal.     If there are any unresolved concerns regarding hearing loss an audiogram is recommended    Xiao Caba PA-C  ENT  [facility]

## 2023-06-16 ENCOUNTER — OFFICE VISIT (OUTPATIENT)
Dept: OTOLARYNGOLOGY | Facility: OTHER | Age: 83
End: 2023-06-16
Attending: PHYSICIAN ASSISTANT
Payer: MEDICARE

## 2023-06-16 VITALS
TEMPERATURE: 97.5 F | OXYGEN SATURATION: 98 % | DIASTOLIC BLOOD PRESSURE: 78 MMHG | HEART RATE: 66 BPM | SYSTOLIC BLOOD PRESSURE: 152 MMHG

## 2023-06-16 DIAGNOSIS — H61.23 BILATERAL IMPACTED CERUMEN: Primary | ICD-10-CM

## 2023-06-16 PROCEDURE — 69210 REMOVE IMPACTED EAR WAX UNI: CPT | Performed by: PHYSICIAN ASSISTANT

## 2023-06-16 PROCEDURE — G0463 HOSPITAL OUTPT CLINIC VISIT: HCPCS | Mod: 25

## 2023-06-16 PROCEDURE — 92504 EAR MICROSCOPY EXAMINATION: CPT | Performed by: PHYSICIAN ASSISTANT

## 2023-06-16 RX ORDER — NIFEDIPINE 30 MG
30 TABLET, EXTENDED RELEASE ORAL DAILY
COMMUNITY
Start: 2022-08-01

## 2023-06-16 RX ORDER — VALSARTAN 160 MG/1
160 TABLET ORAL DAILY
COMMUNITY
Start: 2023-05-17

## 2023-06-16 RX ORDER — PIOGLITAZONEHYDROCHLORIDE 15 MG/1
15 TABLET ORAL DAILY
COMMUNITY
Start: 2023-03-27

## 2023-06-16 RX ORDER — ORAL SEMAGLUTIDE 3 MG/1
3 TABLET ORAL DAILY
COMMUNITY
Start: 2022-12-14

## 2023-06-16 RX ORDER — LABETALOL 100 MG/1
100 TABLET, FILM COATED ORAL DAILY
COMMUNITY
Start: 2023-06-15

## 2023-06-16 ASSESSMENT — PAIN SCALES - GENERAL: PAINLEVEL: NO PAIN (0)

## 2023-06-16 NOTE — PATIENT INSTRUCTIONS
Ears were cleaned.   Ears look well. No fluid or infection.   Return in 2 years or as needed for ear cleaning  Consider hearing test/ audiogram.         Thank you for allowing Xiao Caba PA-C and our ENT team to participate in your care.  If your medications are too expensive, please give the nurse a call.  We can possibly change this medication.  If you have a scheduling or an appointment question please contact our Health Unit Coordinator at [phone], Ext. [phone].    ALL nursing questions or concerns can be directed to your ENT nurse at: [phone] Thelma

## (undated) DEVICE — BETADINE 5% STERILE OPHTHALMIC SOLUTION 1 OZ.

## (undated) DEVICE — INSTRUMENT WIPE-VISIWIPE

## (undated) DEVICE — CANNULA-NUCLEUS HYDRODISSECTOR

## (undated) DEVICE — GLV-7.0 PROTEXIS PI CLASSIC LF/PF

## (undated) DEVICE — PACK-EYE-CUSTOM

## (undated) DEVICE — LABEL-STERILE PREPRINTED FOR OR

## (undated) DEVICE — BIN-TECNIS DCB00 LENSES

## (undated) DEVICE — KNIFE-MICRO UNITOME 5.0MM

## (undated) DEVICE — GLV-7.5 PROTEXIS PI CLASSIC LF/PF

## (undated) DEVICE — SENSOR-OXISENSOR II ADULT

## (undated) DEVICE — BIN-LENS IMPLANT CART

## (undated) DEVICE — BIN-CATARACT BIN

## (undated) DEVICE — CYSTOTOME-IRRIGATING  25G

## (undated) DEVICE — KNIFE-KERATOME SLIT 2.8MM

## (undated) DEVICE — HANDPIECE-CAPSULEGUARD I/A STELLARIS

## (undated) DEVICE — LENS DELIVERY SYSTEM-SOFPORT LI61AO (EZ-28)

## (undated) DEVICE — PACK-PHACO STELLARIS